# Patient Record
Sex: FEMALE | Race: WHITE | NOT HISPANIC OR LATINO | Employment: OTHER | URBAN - METROPOLITAN AREA
[De-identification: names, ages, dates, MRNs, and addresses within clinical notes are randomized per-mention and may not be internally consistent; named-entity substitution may affect disease eponyms.]

---

## 2020-08-29 ENCOUNTER — APPOINTMENT (EMERGENCY)
Dept: CT IMAGING | Facility: HOSPITAL | Age: 85
End: 2020-08-29
Payer: COMMERCIAL

## 2020-08-29 ENCOUNTER — HOSPITAL ENCOUNTER (EMERGENCY)
Facility: HOSPITAL | Age: 85
End: 2020-08-31
Attending: EMERGENCY MEDICINE | Admitting: EMERGENCY MEDICINE
Payer: COMMERCIAL

## 2020-08-29 DIAGNOSIS — F22 PARANOIA (HCC): ICD-10-CM

## 2020-08-29 DIAGNOSIS — R44.1 VISUAL HALLUCINATIONS: ICD-10-CM

## 2020-08-29 DIAGNOSIS — Z00.8 MEDICAL CLEARANCE FOR PSYCHIATRIC ADMISSION: ICD-10-CM

## 2020-08-29 DIAGNOSIS — Z00.8 ENCOUNTER FOR PSYCHOLOGICAL EVALUATION: Primary | ICD-10-CM

## 2020-08-29 LAB
ANION GAP SERPL CALCULATED.3IONS-SCNC: 7 MMOL/L (ref 4–13)
ATRIAL RATE: 340 BPM
BACTERIA UR QL AUTO: ABNORMAL /HPF
BASOPHILS # BLD AUTO: 0.05 THOUSANDS/ΜL (ref 0–0.1)
BASOPHILS NFR BLD AUTO: 1 % (ref 0–1)
BILIRUB UR QL STRIP: NEGATIVE
BUN SERPL-MCNC: 15 MG/DL (ref 5–25)
CALCIUM SERPL-MCNC: 9.4 MG/DL (ref 8.3–10.1)
CAOX CRY URNS QL MICRO: ABNORMAL /HPF
CHLORIDE SERPL-SCNC: 107 MMOL/L (ref 100–108)
CLARITY UR: ABNORMAL
CO2 SERPL-SCNC: 28 MMOL/L (ref 21–32)
COLOR UR: YELLOW
CREAT SERPL-MCNC: 1.33 MG/DL (ref 0.6–1.3)
EOSINOPHIL # BLD AUTO: 0.13 THOUSAND/ΜL (ref 0–0.61)
EOSINOPHIL NFR BLD AUTO: 2 % (ref 0–6)
ERYTHROCYTE [DISTWIDTH] IN BLOOD BY AUTOMATED COUNT: 14 % (ref 11.6–15.1)
GFR SERPL CREATININE-BSD FRML MDRD: 37 ML/MIN/1.73SQ M
GLUCOSE SERPL-MCNC: 95 MG/DL (ref 65–140)
GLUCOSE UR STRIP-MCNC: NEGATIVE MG/DL
HCT VFR BLD AUTO: 36.9 % (ref 34.8–46.1)
HGB BLD-MCNC: 11.4 G/DL (ref 11.5–15.4)
HGB UR QL STRIP.AUTO: NEGATIVE
IMM GRANULOCYTES # BLD AUTO: 0.03 THOUSAND/UL (ref 0–0.2)
IMM GRANULOCYTES NFR BLD AUTO: 1 % (ref 0–2)
INR PPP: 2.85 (ref 0.84–1.19)
KETONES UR STRIP-MCNC: NEGATIVE MG/DL
LEUKOCYTE ESTERASE UR QL STRIP: NEGATIVE
LYMPHOCYTES # BLD AUTO: 1.18 THOUSANDS/ΜL (ref 0.6–4.47)
LYMPHOCYTES NFR BLD AUTO: 20 % (ref 14–44)
MCH RBC QN AUTO: 31.9 PG (ref 26.8–34.3)
MCHC RBC AUTO-ENTMCNC: 30.9 G/DL (ref 31.4–37.4)
MCV RBC AUTO: 103 FL (ref 82–98)
MONOCYTES # BLD AUTO: 0.5 THOUSAND/ΜL (ref 0.17–1.22)
MONOCYTES NFR BLD AUTO: 9 % (ref 4–12)
NEUTROPHILS # BLD AUTO: 3.92 THOUSANDS/ΜL (ref 1.85–7.62)
NEUTS SEG NFR BLD AUTO: 67 % (ref 43–75)
NITRITE UR QL STRIP: POSITIVE
NON-SQ EPI CELLS URNS QL MICRO: ABNORMAL /HPF
NRBC BLD AUTO-RTO: 0 /100 WBCS
PH UR STRIP.AUTO: 5 [PH] (ref 4.5–8)
PLATELET # BLD AUTO: 180 THOUSANDS/UL (ref 149–390)
PMV BLD AUTO: 10.7 FL (ref 8.9–12.7)
POTASSIUM SERPL-SCNC: 4.1 MMOL/L (ref 3.5–5.3)
PROT UR STRIP-MCNC: NEGATIVE MG/DL
PROTHROMBIN TIME: 29.3 SECONDS (ref 11.6–14.5)
QRS AXIS: -50 DEGREES
QRSD INTERVAL: 152 MS
QT INTERVAL: 412 MS
QTC INTERVAL: 453 MS
RBC # BLD AUTO: 3.57 MILLION/UL (ref 3.81–5.12)
RBC #/AREA URNS AUTO: ABNORMAL /HPF
SARS-COV-2 RNA RESP QL NAA+PROBE: NEGATIVE
SODIUM SERPL-SCNC: 142 MMOL/L (ref 136–145)
SP GR UR STRIP.AUTO: >=1.03 (ref 1–1.03)
T WAVE AXIS: 139 DEGREES
TROPONIN I SERPL-MCNC: <0.02 NG/ML
TSH SERPL DL<=0.05 MIU/L-ACNC: 0.69 UIU/ML (ref 0.36–3.74)
UROBILINOGEN UR QL STRIP.AUTO: 0.2 E.U./DL
VENTRICULAR RATE: 73 BPM
WBC # BLD AUTO: 5.81 THOUSAND/UL (ref 4.31–10.16)
WBC #/AREA URNS AUTO: ABNORMAL /HPF

## 2020-08-29 PROCEDURE — 87635 SARS-COV-2 COVID-19 AMP PRB: CPT | Performed by: EMERGENCY MEDICINE

## 2020-08-29 PROCEDURE — G1004 CDSM NDSC: HCPCS

## 2020-08-29 PROCEDURE — 85610 PROTHROMBIN TIME: CPT | Performed by: EMERGENCY MEDICINE

## 2020-08-29 PROCEDURE — 99285 EMERGENCY DEPT VISIT HI MDM: CPT

## 2020-08-29 PROCEDURE — 70450 CT HEAD/BRAIN W/O DYE: CPT

## 2020-08-29 PROCEDURE — 81001 URINALYSIS AUTO W/SCOPE: CPT

## 2020-08-29 PROCEDURE — 99285 EMERGENCY DEPT VISIT HI MDM: CPT | Performed by: EMERGENCY MEDICINE

## 2020-08-29 PROCEDURE — 84484 ASSAY OF TROPONIN QUANT: CPT | Performed by: EMERGENCY MEDICINE

## 2020-08-29 PROCEDURE — 84443 ASSAY THYROID STIM HORMONE: CPT | Performed by: EMERGENCY MEDICINE

## 2020-08-29 PROCEDURE — 93010 ELECTROCARDIOGRAM REPORT: CPT | Performed by: INTERNAL MEDICINE

## 2020-08-29 PROCEDURE — 36415 COLL VENOUS BLD VENIPUNCTURE: CPT | Performed by: EMERGENCY MEDICINE

## 2020-08-29 PROCEDURE — 80048 BASIC METABOLIC PNL TOTAL CA: CPT | Performed by: EMERGENCY MEDICINE

## 2020-08-29 PROCEDURE — 93005 ELECTROCARDIOGRAM TRACING: CPT

## 2020-08-29 PROCEDURE — 85025 COMPLETE CBC W/AUTO DIFF WBC: CPT | Performed by: EMERGENCY MEDICINE

## 2020-08-29 RX ORDER — POTASSIUM CHLORIDE 20 MEQ/1
20 TABLET, EXTENDED RELEASE ORAL DAILY
Status: DISCONTINUED | OUTPATIENT
Start: 2020-08-30 | End: 2020-08-31 | Stop reason: HOSPADM

## 2020-08-29 RX ORDER — FAMOTIDINE 20 MG/1
20 TABLET, FILM COATED ORAL 2 TIMES DAILY
COMMUNITY
End: 2020-09-10 | Stop reason: HOSPADM

## 2020-08-29 RX ORDER — LEVOTHYROXINE SODIUM 0.05 MG/1
100 TABLET ORAL
Status: DISCONTINUED | OUTPATIENT
Start: 2020-08-30 | End: 2020-08-31 | Stop reason: HOSPADM

## 2020-08-29 RX ORDER — POTASSIUM CHLORIDE 20 MEQ/1
20 TABLET, EXTENDED RELEASE ORAL DAILY
COMMUNITY
End: 2020-09-10 | Stop reason: HOSPADM

## 2020-08-29 RX ORDER — NITROGLYCERIN 0.4 MG/1
0.4 TABLET SUBLINGUAL
COMMUNITY
End: 2020-10-16 | Stop reason: HOSPADM

## 2020-08-29 RX ORDER — WARFARIN SODIUM 5 MG/1
5 TABLET ORAL
Status: DISCONTINUED | OUTPATIENT
Start: 2020-08-29 | End: 2020-08-31 | Stop reason: HOSPADM

## 2020-08-29 RX ORDER — OMALIZUMAB 202.5 MG/1.4ML
150 INJECTION, SOLUTION SUBCUTANEOUS
COMMUNITY
End: 2020-10-16 | Stop reason: HOSPADM

## 2020-08-29 RX ORDER — METOPROLOL TARTRATE 50 MG/1
50 TABLET, FILM COATED ORAL DAILY
Status: DISCONTINUED | OUTPATIENT
Start: 2020-08-30 | End: 2020-08-31 | Stop reason: HOSPADM

## 2020-08-29 RX ORDER — FUROSEMIDE 40 MG/1
40 TABLET ORAL EVERY OTHER DAY
COMMUNITY
End: 2020-09-10 | Stop reason: HOSPADM

## 2020-08-29 RX ORDER — FOLIC ACID 1 MG/1
1 TABLET ORAL DAILY
Status: DISCONTINUED | OUTPATIENT
Start: 2020-08-30 | End: 2020-08-31 | Stop reason: HOSPADM

## 2020-08-29 RX ORDER — FAMOTIDINE 20 MG/1
20 TABLET, FILM COATED ORAL DAILY
Status: DISCONTINUED | OUTPATIENT
Start: 2020-08-30 | End: 2020-08-31 | Stop reason: HOSPADM

## 2020-08-29 RX ORDER — FOLIC ACID 1 MG/1
1 TABLET ORAL DAILY
COMMUNITY
End: 2020-09-10 | Stop reason: HOSPADM

## 2020-08-29 RX ORDER — GLIMEPIRIDE 2 MG/1
2 TABLET ORAL
Status: DISCONTINUED | OUTPATIENT
Start: 2020-08-30 | End: 2020-08-31 | Stop reason: HOSPADM

## 2020-08-29 RX ORDER — SERTRALINE HYDROCHLORIDE 25 MG/1
25 TABLET, FILM COATED ORAL DAILY
COMMUNITY
Start: 2020-05-15 | End: 2020-09-10 | Stop reason: HOSPADM

## 2020-08-29 RX ORDER — METOPROLOL TARTRATE 50 MG/1
50 TABLET, FILM COATED ORAL 2 TIMES DAILY
COMMUNITY
Start: 2020-08-07 | End: 2020-09-10 | Stop reason: HOSPADM

## 2020-08-29 RX ORDER — WARFARIN SODIUM 5 MG/1
5 TABLET ORAL DAILY
COMMUNITY
Start: 2020-05-15 | End: 2020-09-10 | Stop reason: HOSPADM

## 2020-08-29 RX ORDER — ASPIRIN 81 MG/1
81 TABLET ORAL DAILY
Status: DISCONTINUED | OUTPATIENT
Start: 2020-08-30 | End: 2020-08-31 | Stop reason: HOSPADM

## 2020-08-29 RX ORDER — FEXOFENADINE HCL 180 MG/1
180 TABLET ORAL DAILY
Status: ON HOLD | COMMUNITY
End: 2020-09-07 | Stop reason: ALTCHOICE

## 2020-08-29 RX ORDER — GLIMEPIRIDE 2 MG/1
2 TABLET ORAL
COMMUNITY
End: 2020-09-10 | Stop reason: HOSPADM

## 2020-08-29 RX ORDER — LANOLIN ALCOHOL/MO/W.PET/CERES
1000 CREAM (GRAM) TOPICAL DAILY
COMMUNITY
End: 2020-09-10 | Stop reason: HOSPADM

## 2020-08-29 RX ORDER — FUROSEMIDE 40 MG/1
40 TABLET ORAL DAILY
Status: DISCONTINUED | OUTPATIENT
Start: 2020-08-30 | End: 2020-08-31 | Stop reason: HOSPADM

## 2020-08-29 RX ORDER — LEVOTHYROXINE SODIUM 0.1 MG/1
100 TABLET ORAL DAILY
COMMUNITY
End: 2020-09-10 | Stop reason: HOSPADM

## 2020-08-29 RX ADMIN — SERTRALINE HYDROCHLORIDE 25 MG: 50 TABLET ORAL at 21:10

## 2020-08-29 RX ADMIN — WARFARIN SODIUM 5 MG: 5 TABLET ORAL at 21:08
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
## 2020-08-29 NOTE — ED NOTES
Pt reports to RN that she does not want visitors, only  her daughter and her son are to visit  Pt reports that she does not want the nuns that were at her door to come into her room because they are not welcome and they are always trying to sell her things  There are no nuns present and there have not been nuns in the department  RN assures pt that we will check with her before we allow anyone to come into her room and will only allow the people that she is comfortable with to visit her         Creig GABRIELE Modi  08/29/20 8528

## 2020-08-29 NOTE — ED NOTES
Pt and pt daughter are requesting to stay local due to pt daughter not being able to drive     CW spoke with [address] specialist who informed me that there are no beds at [facility], [address] faxed 201 to      Jame Esteban Crisis Worker

## 2020-08-29 NOTE — ED NOTES
Pt is a 82 y o  female who was brought to the ED with   Chief Complaint   Patient presents with    Psychiatric Evaluation     pt's daughter states pt has been seeing people that are not there since may  was seen at [facility] and was told it was due to her B vitamins  paranoid thoughts since last week, pt thinks people are lighting fires in apartment  daughter states the things shes saying don't make sense  Pt brought to the ED by her daughter with complaints of increased V/H "I see people trying to set fires in my apartment" and its been getting worse and I feeling paranoid", Pt daughter reports that pt V/H has been getting worse and pt is paranoid  Pt admits to V/H, Pt denies S/I,H/I,A/H  Intake Assessment completed, Safety risk Assessment completed  CW met with pt and discussed the process of a [facility] admission pt has agreed and signed a 201, CW discussed this case and pt plan with ED Physician who is in agreement with this plan    CW will start bed search and complete the Pre-Cert      Jailyn Shah Crisis Worker Sony Leahy is a 50 

## 2020-08-30 RX ORDER — ACETAMINOPHEN 325 MG/1
650 TABLET ORAL ONCE
Status: COMPLETED | OUTPATIENT
Start: 2020-08-30 | End: 2020-08-30

## 2020-08-30 RX ORDER — EPINEPHRINE 1 MG/ML
INJECTION, SOLUTION, CONCENTRATE INTRAVENOUS
Status: DISCONTINUED
Start: 2020-08-30 | End: 2020-08-30 | Stop reason: WASHOUT

## 2020-08-30 RX ADMIN — FOLIC ACID 1 MG: 1 TABLET ORAL at 08:45

## 2020-08-30 RX ADMIN — METOPROLOL TARTRATE 50 MG: 50 TABLET, FILM COATED ORAL at 08:46

## 2020-08-30 RX ADMIN — LEVOTHYROXINE SODIUM 100 MCG: 50 TABLET ORAL at 08:45

## 2020-08-30 RX ADMIN — ACETAMINOPHEN 650 MG: 325 TABLET ORAL at 10:32

## 2020-08-30 RX ADMIN — WARFARIN SODIUM 5 MG: 5 TABLET ORAL at 18:04

## 2020-08-30 RX ADMIN — SERTRALINE HYDROCHLORIDE 25 MG: 50 TABLET ORAL at 21:49

## 2020-08-30 RX ADMIN — FAMOTIDINE 20 MG: 20 TABLET, FILM COATED ORAL at 08:45

## 2020-08-30 RX ADMIN — ASPIRIN 81 MG: 81 TABLET, COATED ORAL at 08:46

## 2020-08-30 RX ADMIN — POTASSIUM CHLORIDE 20 MEQ: 1500 TABLET, EXTENDED RELEASE ORAL at 08:46

## 2020-08-30 RX ADMIN — GLIMEPIRIDE 2 MG: 2 TABLET ORAL at 08:47

## 2020-08-30 RX ADMIN — FUROSEMIDE 40 MG: 40 TABLET ORAL at 08:46

## 2020-08-30 NOTE — ED NOTES
Assumed care of pt at this time, offered pt a shower pt is agreeable        Akilah Tong RN  08/30/20 0411

## 2020-08-30 NOTE — ED NOTES
Pt talking on phone, sitting comfortably in bed  No distress noted        Francoise Veras  08/30/20 2840

## 2020-08-30 NOTE — ED NOTES
Pt ambulated to restroom and back to room using walker with steady gait        Joleen Frazier RN  08/30/20 5420

## 2020-08-30 NOTE — ED NOTES
Pt ambulated well using a walker, pt showered, changed into clean clothes, brushed her teeth, brushed her hair  Pt's bedding changed, pt ambulated back to her room using walker with steady gait  When arriving back to room pt looks over to chair with her clothing states "she is still sleeping, my daughter is still sleeping" few minutes later pt states "I don't think that's my daughter" pt made aware that it is her clothing on the chair         Skip Stuart RN  08/30/20 7156

## 2020-08-30 NOTE — ED NOTES
Pt finished 100% of food on lunch tray  Ambulated using walker to restroom and back with steady gait        Rock Larry RN  08/30/20 2383

## 2020-08-31 ENCOUNTER — HOSPITAL ENCOUNTER (INPATIENT)
Facility: HOSPITAL | Age: 85
LOS: 10 days | Discharge: HOME | DRG: 885 | End: 2020-09-10
Attending: PSYCHIATRY & NEUROLOGY | Admitting: PSYCHIATRY & NEUROLOGY
Payer: COMMERCIAL

## 2020-08-31 VITALS
RESPIRATION RATE: 16 BRPM | OXYGEN SATURATION: 98 % | DIASTOLIC BLOOD PRESSURE: 78 MMHG | WEIGHT: 206.79 LBS | TEMPERATURE: 97.8 F | HEART RATE: 78 BPM | SYSTOLIC BLOOD PRESSURE: 154 MMHG

## 2020-08-31 DIAGNOSIS — I48.0 PAROXYSMAL ATRIAL FIBRILLATION (HCC): ICD-10-CM

## 2020-08-31 DIAGNOSIS — I25.5 ISCHEMIC CARDIOMYOPATHY: ICD-10-CM

## 2020-08-31 DIAGNOSIS — E53.8 B12 DEFICIENCY: ICD-10-CM

## 2020-08-31 DIAGNOSIS — F29 PSYCHOSIS, UNSPECIFIED PSYCHOSIS TYPE (HCC): Primary | ICD-10-CM

## 2020-08-31 DIAGNOSIS — E11.9 DIABETES MELLITUS TYPE 2, NONINSULIN DEPENDENT (HCC): ICD-10-CM

## 2020-08-31 DIAGNOSIS — E03.9 ACQUIRED HYPOTHYROIDISM: ICD-10-CM

## 2020-08-31 DIAGNOSIS — I25.10 CORONARY ARTERY DISEASE INVOLVING NATIVE CORONARY ARTERY OF NATIVE HEART WITHOUT ANGINA PECTORIS: ICD-10-CM

## 2020-08-31 DIAGNOSIS — K21.9 GERD (GASTROESOPHAGEAL REFLUX DISEASE): ICD-10-CM

## 2020-08-31 DIAGNOSIS — Z00.8 MEDICAL CLEARANCE FOR PSYCHIATRIC ADMISSION: ICD-10-CM

## 2020-08-31 DIAGNOSIS — E53.8 FOLATE DEFICIENCY: ICD-10-CM

## 2020-08-31 DIAGNOSIS — F32.A DEPRESSIVE DISORDER: ICD-10-CM

## 2020-08-31 DIAGNOSIS — E87.6 HYPOKALEMIA: ICD-10-CM

## 2020-08-31 LAB
AMPHETAMINES SERPL QL SCN: NEGATIVE
BARBITURATES UR QL: NEGATIVE
BENZODIAZ UR QL: NEGATIVE
COCAINE UR QL: NEGATIVE
METHADONE UR QL: NEGATIVE
OPIATES UR QL SCN: NEGATIVE
OXYCODONE+OXYMORPHONE UR QL SCN: NEGATIVE
PCP UR QL: NEGATIVE
THC UR QL: NEGATIVE

## 2020-08-31 PROCEDURE — 80307 DRUG TEST PRSMV CHEM ANLYZR: CPT | Performed by: EMERGENCY MEDICINE

## 2020-08-31 PROCEDURE — 99284 EMERGENCY DEPT VISIT MOD MDM: CPT | Performed by: NURSE PRACTITIONER

## 2020-08-31 RX ORDER — WARFARIN SODIUM 5 MG/1
5 TABLET ORAL
Status: CANCELLED | OUTPATIENT
Start: 2020-08-31

## 2020-08-31 RX ORDER — HALOPERIDOL 5 MG/ML
2.5 INJECTION INTRAMUSCULAR
Status: CANCELLED | OUTPATIENT
Start: 2020-08-31

## 2020-08-31 RX ORDER — MAGNESIUM HYDROXIDE/ALUMINUM HYDROXICE/SIMETHICONE 120; 1200; 1200 MG/30ML; MG/30ML; MG/30ML
30 SUSPENSION ORAL EVERY 4 HOURS PRN
Status: CANCELLED | OUTPATIENT
Start: 2020-08-31

## 2020-08-31 RX ORDER — FOLIC ACID 1 MG/1
1 TABLET ORAL DAILY
Status: CANCELLED | OUTPATIENT
Start: 2020-09-01

## 2020-08-31 RX ORDER — POTASSIUM CHLORIDE 20 MEQ/1
20 TABLET, EXTENDED RELEASE ORAL DAILY
Status: DISCONTINUED | OUTPATIENT
Start: 2020-09-01 | End: 2020-09-10 | Stop reason: HOSPADM

## 2020-08-31 RX ORDER — FAMOTIDINE 20 MG/1
20 TABLET, FILM COATED ORAL DAILY
Status: CANCELLED | OUTPATIENT
Start: 2020-09-01

## 2020-08-31 RX ORDER — HALOPERIDOL 5 MG/ML
2.5 INJECTION INTRAMUSCULAR
Status: DISCONTINUED | OUTPATIENT
Start: 2020-08-31 | End: 2020-09-10 | Stop reason: HOSPADM

## 2020-08-31 RX ORDER — POTASSIUM CHLORIDE 20 MEQ/1
20 TABLET, EXTENDED RELEASE ORAL DAILY
Status: CANCELLED | OUTPATIENT
Start: 2020-09-01

## 2020-08-31 RX ORDER — WARFARIN SODIUM 5 MG/1
5 TABLET ORAL
Status: DISCONTINUED | OUTPATIENT
Start: 2020-09-01 | End: 2020-09-01

## 2020-08-31 RX ORDER — FAMOTIDINE 20 MG/1
20 TABLET, FILM COATED ORAL DAILY
Status: DISCONTINUED | OUTPATIENT
Start: 2020-09-01 | End: 2020-09-07

## 2020-08-31 RX ORDER — ASPIRIN 81 MG/1
81 TABLET ORAL DAILY
Status: DISCONTINUED | OUTPATIENT
Start: 2020-09-01 | End: 2020-09-10 | Stop reason: HOSPADM

## 2020-08-31 RX ORDER — ACETAMINOPHEN 325 MG/1
650 TABLET ORAL ONCE
Status: COMPLETED | OUTPATIENT
Start: 2020-08-31 | End: 2020-08-31

## 2020-08-31 RX ORDER — POLYETHYLENE GLYCOL 3350 17 G/17G
17 POWDER, FOR SOLUTION ORAL DAILY PRN
Status: DISCONTINUED | OUTPATIENT
Start: 2020-08-31 | End: 2020-09-10 | Stop reason: HOSPADM

## 2020-08-31 RX ORDER — POLYETHYLENE GLYCOL 3350 17 G/17G
17 POWDER, FOR SOLUTION ORAL DAILY PRN
Status: CANCELLED | OUTPATIENT
Start: 2020-08-31

## 2020-08-31 RX ORDER — FUROSEMIDE 40 MG/1
40 TABLET ORAL DAILY
Status: CANCELLED | OUTPATIENT
Start: 2020-09-01

## 2020-08-31 RX ORDER — FOLIC ACID 1 MG/1
1 TABLET ORAL DAILY
Status: DISCONTINUED | OUTPATIENT
Start: 2020-09-01 | End: 2020-09-10 | Stop reason: HOSPADM

## 2020-08-31 RX ORDER — MAGNESIUM HYDROXIDE/ALUMINUM HYDROXICE/SIMETHICONE 120; 1200; 1200 MG/30ML; MG/30ML; MG/30ML
30 SUSPENSION ORAL EVERY 4 HOURS PRN
Status: DISCONTINUED | OUTPATIENT
Start: 2020-08-31 | End: 2020-09-10 | Stop reason: HOSPADM

## 2020-08-31 RX ORDER — GLIMEPIRIDE 2 MG/1
2 TABLET ORAL
Status: CANCELLED | OUTPATIENT
Start: 2020-09-01

## 2020-08-31 RX ORDER — LORAZEPAM 1 MG/1
1 TABLET ORAL ONCE
Status: DISCONTINUED | OUTPATIENT
Start: 2020-08-31 | End: 2020-08-31 | Stop reason: HOSPADM

## 2020-08-31 RX ORDER — GLIMEPIRIDE 2 MG/1
2 TABLET ORAL
Status: DISCONTINUED | OUTPATIENT
Start: 2020-09-01 | End: 2020-09-10 | Stop reason: HOSPADM

## 2020-08-31 RX ORDER — ASPIRIN 81 MG/1
81 TABLET ORAL DAILY
Status: CANCELLED | OUTPATIENT
Start: 2020-09-01

## 2020-08-31 RX ORDER — ACETAMINOPHEN 325 MG/1
650 TABLET ORAL EVERY 6 HOURS PRN
Status: DISCONTINUED | OUTPATIENT
Start: 2020-08-31 | End: 2020-09-10 | Stop reason: HOSPADM

## 2020-08-31 RX ORDER — LEVOTHYROXINE SODIUM 0.1 MG/1
100 TABLET ORAL
Status: DISCONTINUED | OUTPATIENT
Start: 2020-09-01 | End: 2020-09-10 | Stop reason: HOSPADM

## 2020-08-31 RX ORDER — LEVOTHYROXINE SODIUM 0.05 MG/1
100 TABLET ORAL
Status: CANCELLED | OUTPATIENT
Start: 2020-09-01

## 2020-08-31 RX ORDER — FUROSEMIDE 40 MG/1
40 TABLET ORAL DAILY
Status: DISCONTINUED | OUTPATIENT
Start: 2020-09-01 | End: 2020-09-07

## 2020-08-31 RX ORDER — ACETAMINOPHEN 325 MG/1
650 TABLET ORAL EVERY 6 HOURS PRN
Status: CANCELLED | OUTPATIENT
Start: 2020-08-31

## 2020-08-31 RX ADMIN — FAMOTIDINE 20 MG: 20 TABLET, FILM COATED ORAL at 08:55

## 2020-08-31 RX ADMIN — ACETAMINOPHEN 650 MG: 325 TABLET ORAL at 00:59

## 2020-08-31 RX ADMIN — ASPIRIN 81 MG: 81 TABLET, COATED ORAL at 08:56

## 2020-08-31 RX ADMIN — METOPROLOL TARTRATE 50 MG: 50 TABLET, FILM COATED ORAL at 08:56

## 2020-08-31 RX ADMIN — GLIMEPIRIDE 2 MG: 2 TABLET ORAL at 07:40

## 2020-08-31 RX ADMIN — FOLIC ACID 1 MG: 1 TABLET ORAL at 08:55

## 2020-08-31 RX ADMIN — LEVOTHYROXINE SODIUM 100 MCG: 50 TABLET ORAL at 06:46

## 2020-08-31 RX ADMIN — POTASSIUM CHLORIDE 20 MEQ: 1500 TABLET, EXTENDED RELEASE ORAL at 08:56

## 2020-08-31 RX ADMIN — METOPROLOL TARTRATE 25 MG: 25 TABLET, FILM COATED ORAL at 22:12

## 2020-08-31 RX ADMIN — WARFARIN SODIUM 5 MG: 5 TABLET ORAL at 19:25

## 2020-08-31 NOTE — EMTALA/ACUTE CARE TRANSFER
[facility]  [address] [facility] [address]  Dept: [phone]      EMTALA TRANSFER CONSENT    NAME Dru Avina                                         1935                              MRN [id number]    I have been informed of my rights regarding examination, treatment, and transfer   by Dr Chaim Peck MD    Benefits: Specialized equipment and/or services available at the receiving facility (Include comment)________________________    Risks: Potential for delay in receiving treatment, Increased discomfort during transfer      Consent for Transfer:  I acknowledge that my medical condition has been evaluated and explained to me by the emergency department physician or other qualified medical person and/or my attending physician, who has recommended that I be transferred to the service of  Accepting Physician: Dr Cindi Nix at [address] [place] [address] Name, [address] : [address]  The above potential benefits of such transfer, the potential risks associated with such transfer, and the probable risks of not being transferred have been explained to me, and I fully understand them  The doctor has explained that, in my case, the benefits of transfer outweigh the risks  I agree to be transferred  I authorize the performance of emergency medical procedures and treatments upon me in both transit and upon arrival at the receiving facility  Additionally, I authorize the release of any and all medical records to the receiving facility and request they be transported with me, if possible  I understand that the safest mode of transportation during a medical emergency is an ambulance and that the Hospital advocates the use of this mode of transport   Risks of traveling to the receiving facility by car, including absence of medical control, life sustaining equipment, such as oxygen, and medical personnel has been explained to me and I fully understand them  (DINORAH CORRECT BOX BELOW)  [  ]  I consent to the stated transfer and to be transported by ambulance/helicopter  [  ]  I consent to the stated transfer, but refuse transportation by ambulance and accept full responsibility for my transportation by car  I understand the risks of non-ambulance transfers and I exonerate the Hospital and its staff from any deterioration in my condition that results from this refusal     X___________________________________________    DATE  20  TIME________  Signature of patient or legally responsible individual signing on patient behalf           RELATIONSHIP TO PATIENT_________________________          Provider Certification    NAME Jose Maloney                                         1935                              MRN [id number]    A medical screening exam was performed on the above named patient  Based on the examination:    Condition Necessitating Transfer The primary encounter diagnosis was Encounter for psychological evaluation  Diagnoses of Visual hallucinations, Paranoia ([facility] [address] ), and Medical clearance for psychiatric admission were also pertinent to this visit      Patient Condition: The patient has been stabilized such that within reasonable medical probability, no material deterioration of the patient condition or the condition of the unborn child(javier) is likely to result from the transfer    Reason for Transfer: Level of Care needed not available at this facility    Transfer Requirements: [address] [facility]   · Space available and qualified personnel available for treatment as acknowledged by Eric White ; [phone]  · Agreed to accept transfer and to provide appropriate medical treatment as acknowledged by       Dr Petrona Phillips  · Appropriate medical records of the examination and treatment of the patient are provided at the time of transfer   [address] _______  · Transfer will be performed by qualified personnel from [facility]  and appropriate transfer equipment as required, including the use of necessary and appropriate life support measures  Provider Certification: I have examined the patient and explained the following risks and benefits of being transferred/refusing transfer to the patient/family:  General risk, such as traffic hazards, adverse weather conditions, rough terrain or turbulence, possible failure of equipment (including vehicle or aircraft), or consequences of actions of persons outside the control of the transport personnel      Based on these reasonable risks and benefits to the patient and/or the unborn child(javier), and based upon the information available at the time of the patients examination, I certify that the medical benefits reasonably to be expected from the provision of appropriate medical treatments at another medical facility outweigh the increasing risks, if any, to the individuals medical condition, and in the case of labor to the unborn child, from effecting the transfer      X____________________________________________ DATE 08/31/20        TIME_______      ORIGINAL - SEND TO MEDICAL RECORDS   COPY - SEND WITH PATIENT DURING TRANSFER

## 2020-08-31 NOTE — ED NOTES
Pt ambulated with walker to and from bathroom        Pratik Aceves  08/30/20 2029 GOAL: Patient will perform bed mobility with min assist in 2 weeks GOAL: Patient will perform bed mobility with supervision in 2 weeks

## 2020-08-31 NOTE — CONSULTS
Consultation - Behavioral Health     Identification Data: Jasiel Nowak 82 y o  female MRN: [id number]  Unit/Bed#: ED 18 Encounter: [id number]    08/31/20  1:32 PM    Inpatient consult to Psychiatry  Consult performed by: KEIKO Rodarte  Consult ordered by: Niko Ashton DO        Physician Requesting Consult: Stevenson Gilford, MD  Principal Problem:<principal problem not specified>    Reason for Consult:  visual halluciantions    History of Present Illness     Jasiel Nowak is a 82 y o  female with a history of depression and anxiety who was admitted to the medical service on 8/29/2020 due to visual hallucinations  Psychiatric consultation was requested due to visual hallucinations  Psychiatric symptoms prior to admission included depression, poor concentration, poor appetite, difficulty sleeping and visual hallucinations of "people"  Onset of symptoms was abrupt starting 3 months ago with gradually worsening course since that time  Stressors preceding admission included COVID-19 issues and ongoing anxiety  Assessment/Plan     Active Problems:    * No active hospital problems  *      Psychiatric Assessment:  Patient is an 82year old female who presented to the ED with c/o worsening visual hallucinations  Patient has no past psychiatric history  Patient received a CT scan and MRI of head which was unremarkable  Daughter is concerned d/t patient living alone  Patient reports VH have been present since May  Daughter reports patient has become more paranoid since the hallucinations began and is concerned patient may do something when responding to them and hurt herself  Upon psychiatric assessment, patient presents with symptoms of depression and anxiety  Patient has a previous diagnosis of depression  Patient displays good eye contact throughout the encounter with an appropriate affect   Patient endorses feelings of decreased/difficulty sleeping, decreased appetite, decreased concentration (reports distracted by her hallucinations), decreased energy level, daily worry, nervousness, and fatigue  Patient states symptoms have been present since May 2020 and have been progressively worsening since that time  Patient states symptoms have never fully resolved since they were diagnosed  Patient currently rates depression as 5/10 (10 being the worst) with moderate anxiety noted  Patient denies suicidal/homicidal ideations or auditory hallucinations  Patient reports visual hallucinations of people  Patient states she has attempted to speak with the people but they do not respond to her and only speak to each other  Patient is responding to internal stimuli at this time as she reported something crawling on the floor during our encounter  Patient reports the last time they felt emotionally well was 3 years ago when her great niece got  and currently rates their level of functioning as decreased  Patient denies access to firearms  Patient reports her hallucinations began in may after she was forced to social distance due to Covid  She states prior to that she was always with her family or going out to lunch with her friends  Patient denies any other events taking place at that time  Patient reports she sees people coming through her walls and she believes they are eating her [place] cookies  She reports a lady with a baby hiding under her bed  Patient states she has not experienced anything like this in the past  Patient is very aware of her hallucinations and states they scare her and reports worsening paranoia  She reports these people light candles and she is worried they will burn down her apartment complex  Patient was on a BHU recently and stated she was placed on Zoloft which made her feel better but then stopped working  She reported the visual hallucinations were always present during that time   Per notes, patient was started on Zoloft d/t decreased vitamin B12 levels which was felt was causing her depression  Patient was able to successfully complete a MSE with very little difficulty  Patient needed prompting on past president only  Patient Strengths: ability for insight, average or above intelligence, compliant with medication, general fund of knowledge, motivation for treatment/growth, patient is on a voluntary commitment, supportive family     Patient Barriers: fear of Covid-19    Diagnosis: Psychosis (F29)    Plan/Recommendation:   1  Patient signed a 201 voluntary commitment with crisis  Awaiting decision from [employer]  2  Psychiatry will sign off  Psychiatric Review Of Systems:    Sleep changes: decreased sleep, difficulty sleeping, difficulty falling asleep  Appetite changes: decreased appetite  Weight changes: no weight change  Energy/anergy: decreased energy  Interest/pleasure/anhedonia: decreased  Somatic symptoms: no  Anxiety/panic: worrying daily  Brie: no  Guilty/hopeless: no  Self injurious behavior/risky behavior: Patient denies current risk or intent to self harm  Suicidal ideation: Denies suicidal ideation  Homicidal ideation: Patient denies homicidal ideations  Auditory hallucinations: no  Visual hallucinations: yes, visual hallucinations of "people"  Other hallucinations: no  Delusional thinking: no  Eating disorder history: no  Obsessive/compulsive symptoms: no    Medical Review Of Systems:      General sleep disturbances, appetite disturbances and decreased functioning   Personality no change in personality   Constitutional feeling tired and low energy   ENT negative   Cardiovascular negative   Respiratory negative   Gastrointestinal negative   Genitourinary negative   Musculoskeletal negative   Integumentary negative   Neurological negative   Endocrine negative   Other Symptoms all other systems are negative       Allergies:     Allergies   Allergen Reactions    Adhesive [Medical Tape]     Amoxicillin     Ancef [Cefazolin]     Cephalosporins     Dofetilide      Cannot take generic    Epinephrine     Erythromycin     Iodine     Levofloxacin     Losartan     Morphine     Other      seafood    Oxycodone     Penicillins     Percocet [Oxycodone-Acetaminophen]     Pineapple     Shellfish-Derived Products     Thorazine [Chlorpromazine]        Medications: All current active medications have been reviewed  Current medications:   Current Facility-Administered Medications   Medication Dose Route Frequency    aspirin (ECOTRIN LOW STRENGTH) EC tablet 81 mg  81 mg Oral Daily    famotidine (PEPCID) tablet 20 mg  20 mg Oral Daily    folic acid (FOLVITE) tablet 1 mg  1 mg Oral Daily    furosemide (LASIX) tablet 40 mg  40 mg Oral Daily    glimepiride (AMARYL) tablet 2 mg  2 mg Oral Daily With Breakfast    levothyroxine tablet 100 mcg  100 mcg Oral Early Morning    LORazepam (ATIVAN) tablet 1 mg  1 mg Oral Once    metoprolol tartrate (LOPRESSOR) tablet 50 mg  50 mg Oral Daily    potassium chloride (K-DUR,KLOR-CON) CR tablet 20 mEq  20 mEq Oral Daily    sertraline (ZOLOFT) tablet 25 mg  25 mg Oral HS    warfarin (COUMADIN) tablet 5 mg  5 mg Oral Daily (warfarin)     Medication prior to admission:   Prior to Admission Medications   Prescriptions Last Dose Informant Patient Reported? Taking?    ASPIRIN 81 PO   Yes Yes   Sig: Take 81 mg by mouth daily   famotidine (PEPCID) 20 mg tablet   Yes Yes   Sig: Take 20 mg by mouth 2 (two) times a day   fexofenadine (ALLEGRA) 180 MG tablet   Yes Yes   Sig: Take 180 mg by mouth daily   folic acid (FOLVITE) 1 mg tablet   Yes Yes   Sig: Take 1 mg by mouth daily   furosemide (LASIX) 40 mg tablet   Yes No   Sig: Take 40 mg by mouth every other day   glimepiride (AMARYL) 2 mg tablet   Yes No   Sig: Take 1 mg by mouth 2 (two) times a day   levothyroxine 100 mcg tablet   Yes No   Sig: Take 100 mcg by mouth daily   metoprolol tartrate (LOPRESSOR) 50 mg tablet   Yes Yes   Sig: Take 50 mg by mouth 2 (two) times a day   nitroglycerin (NITROSTAT) 0 4 mg SL tablet   Yes Yes   Sig: Place 0 4 mg under the tongue every 5 (five) minutes as needed for chest pain   omalizumab (Xolair) 150 mg   Yes Yes   Si mg by Subcutaneous (multi inj) route every 28 days   potassium chloride (K-DUR,KLOR-CON) 20 mEq tablet   Yes Yes   Sig: Take 20 mEq by mouth daily   sertraline (ZOLOFT) 25 mg tablet   Yes Yes   Sig: Take 25 mg by mouth daily   vitamin B-12 (VITAMIN B-12) 1,000 mcg tablet   Yes Yes   Sig: Take 1,000 mcg by mouth daily   warfarin (COUMADIN) 5 mg tablet   Yes Yes   Sig: Take 5 mg by mouth daily      Facility-Administered Medications: None       Objective     Mental Status Evaluation:    Appearance Adequate hygiene and grooming and Good eye contact   Behavior cooperative and friendly   Mood anxious and depressed   Speech Normal rate and volume, Normal rate and Normal volume   Affect appropriate   Thought Processes Goal directed and coherent   Thought Content Paranoid and mistrustful   Associations Tightly connected   Perceptual Disturbances Visual hallucinations   Risk Potential Suicidal/Homicidal Ideation - No evidence of suicidal or homicidal ideation and Patient does not verbalize suicidal or homicidal ideation  Risk of Violence - No evidence of risk for violence found on assessment  Risk of Self Mutilation - No evidence of risk for self mutilation found on assessment   Orientation oriented to person, place, time/date and situation   Memory recent and remote memory grossly intact   Consciousness alert and awake   Attention/Concentration attention span and concentration are age appropriate   Intellect appears to be of average intelligence   Insight fair   Judgement fair   Muscle Strength and Gait uses cane, uses walker   Motor Activity no abnormal movements   Language no difficulty naming common objects, no difficulty repeating a phrase   Fund of Knowledge adequate knowledge of current events  adequate fund of knowledge regarding past history  adequate fund of knowledge regarding vocabulary    Pain none   Pain Scale 0       Vital signs in last 24 hours:    HR:  [63-76] 68  Resp:  [16-18] 18  BP: (106-158)/(52-85) 145/85  No intake or output data in the 24 hours ending 08/31/20 1332      Laboratory Results:   I have personally reviewed all pertinent laboratory/tests results  Most Recent Labs:   Lab Results   Component Value Date    WBC 5 81 08/29/2020    RBC 3 57 (L) 08/29/2020    HGB 11 4 (L) 08/29/2020    HCT 36 9 08/29/2020     08/29/2020    RDW 14 0 08/29/2020    NEUTROABS 3 92 08/29/2020    K 4 1 08/29/2020     08/29/2020    CO2 28 08/29/2020    BUN 15 08/29/2020    CREATININE 1 33 (H) 08/29/2020    CALCIUM 9 4 08/29/2020    GZK1SCSZYCPE 0 686 08/29/2020     Drug Screen: No results found for: AMPMETHUR, BARBTUR, BDZUR, THCUR, COCAINEUR, METHADONEUR, OPIATEUR, PCPUR, ECSTASYUR  Vitamin D Level No results found for: VOVK71DQDUYQ, SDMC809ETIO  Vitamin B12 No results found for: [id number]  Folate No results found for: FOLATE  Urinalysis   Lab Results   Component Value Date    COLORU Yellow 08/29/2020    CLARITYU Cloudy 08/29/2020    SPECGRAV >=1 030 08/29/2020    PHUR 5 0 08/29/2020    LEUKOCYTESUR Negative 08/29/2020    NITRITE Positive (A) 08/29/2020    GLUCOSEU Negative 08/29/2020    KETONESU Negative 08/29/2020    UROBILINOGEN 0 2 08/29/2020    BILIRUBINUR Negative 08/29/2020    BLOODU Negative 08/29/2020    RBCUA None Seen 08/29/2020    WBCUA 2-4 (A) 08/29/2020    EPIS Occasional 08/29/2020    BACTERIA Innumerable (A) 08/29/2020     EKG   Lab Results   Component Value Date    VENTRATE 73 08/29/2020    ATRIALRATE 340 08/29/2020    QRSDINT 152 08/29/2020    QTINT 412 08/29/2020    QRSAXIS -50 08/29/2020    TWAVEAXIS 139 08/29/2020     Imaging Studies: Ct Head Wo Contrast    Result Date: 8/29/2020  Narrative: CT BRAIN - WITHOUT CONTRAST INDICATION:   Altered mental status  COMPARISON:  None  TECHNIQUE:  CT examination of the brain was performed    In addition to axial images, sagittal and coronal 2D reformatted images were created and submitted for interpretation  Radiation dose length product (DLP) for this visit:  803 mGy-cm   This examination, like all CT scans performed in the [facility], was performed utilizing techniques to minimize radiation dose exposure, including the use of iterative reconstruction and automated exposure control  IMAGE QUALITY:  Diagnostic  FINDINGS: PARENCHYMA: Decreased attenuation is noted in periventricular and subcortical white matter demonstrating an appearance that is statistically most likely to represent mild microangiopathic change  No CT signs of acute infarction  No intracranial mass, mass effect or midline shift  No acute parenchymal hemorrhage  VENTRICLES AND EXTRA-AXIAL SPACES:  Normal for the patient's age  VISUALIZED ORBITS AND PARANASAL SINUSES:  Unremarkable  CALVARIUM AND EXTRACRANIAL SOFT TISSUES:  Normal      Impression: No acute intracranial abnormality  Workstation performed: [id number]   Recent Imaging Studies: Procedure: Ct Head Wo Contrast    Result Date: 8/29/2020  Narrative: CT BRAIN - WITHOUT CONTRAST INDICATION:   Altered mental status  COMPARISON:  None  TECHNIQUE:  CT examination of the brain was performed  In addition to axial images, sagittal and coronal 2D reformatted images were created and submitted for interpretation  Radiation dose length product (DLP) for this visit:  803 mGy-cm   This examination, like all CT scans performed in the [facility], was performed utilizing techniques to minimize radiation dose exposure, including the use of iterative reconstruction and automated exposure control  IMAGE QUALITY:  Diagnostic  FINDINGS: PARENCHYMA: Decreased attenuation is noted in periventricular and subcortical white matter demonstrating an appearance that is statistically most likely to represent mild microangiopathic change  No CT signs of acute infarction  No intracranial mass, mass effect or midline shift  No acute parenchymal hemorrhage  VENTRICLES AND EXTRA-AXIAL SPACES:  Normal for the patient's age  VISUALIZED ORBITS AND PARANASAL SINUSES:  Unremarkable  CALVARIUM AND EXTRACRANIAL SOFT TISSUES:  Normal      Impression: No acute intracranial abnormality  Workstation performed: [id number]     Imaging Studies: Ct Head Wo Contrast    Result Date: 8/29/2020  Narrative: CT BRAIN - WITHOUT CONTRAST INDICATION:   Altered mental status  COMPARISON:  None  TECHNIQUE:  CT examination of the brain was performed  In addition to axial images, sagittal and coronal 2D reformatted images were created and submitted for interpretation  Radiation dose length product (DLP) for this visit:  803 mGy-cm   This examination, like all CT scans performed in the [facility], was performed utilizing techniques to minimize radiation dose exposure, including the use of iterative reconstruction and automated exposure control  IMAGE QUALITY:  Diagnostic  FINDINGS: PARENCHYMA: Decreased attenuation is noted in periventricular and subcortical white matter demonstrating an appearance that is statistically most likely to represent mild microangiopathic change  No CT signs of acute infarction  No intracranial mass, mass effect or midline shift  No acute parenchymal hemorrhage  VENTRICLES AND EXTRA-AXIAL SPACES:  Normal for the patient's age  VISUALIZED ORBITS AND PARANASAL SINUSES:  Unremarkable  CALVARIUM AND EXTRACRANIAL SOFT TISSUES:  Normal      Impression: No acute intracranial abnormality   Workstation performed: [id number]       Code Status: No Order  Advance Directive and Living Will:       Power of :      Historical Information     Past Psychiatric History:     Past Inpatient Psychiatric Treatment:   One past inpatient psychiatric hospitalization  Past Outpatient Psychiatric Treatment:    No history of past outpatient psychiatric treatment  Past Suicide Attempts: No evidence of past suicide attempts  Past Violent Behavior: No evidence of past violent behavior and Patient denies history of violent behavior  Past Psychiatric Medication Trials: Zoloft  Trial of injectable psychiatric medication: no    Traumatic History:     Abuse: patient denies history of abuse  Other Traumatic Events: none      Substance Abuse History:    Social History     Tobacco History     Smoking Status  Never Smoker    Smokeless Tobacco Use  Unknown          Alcohol History     Alcohol Use Status  Never          Drug Use     Drug Use Status  Never          Sexual Activity     Sexually Active  Not Asked          Activities of Daily Living    Not Asked                 I have assessed this patient for substance use within the past 12 months and agree with documented findings below       Alcohol use: denies use  Recreational drug use:   Cocaine:  denies use  Heroin:  denies use  Marijuana:  denies use  Other drugs: denies use     Longest clean time: not applicable  History of Inpatient/Outpatient rehabilitation program: not applicable, no  Smoking history: denies use  Use of caffeine: patient reports only drinking decaf coffee, no caffeine use    Family Psychiatric History:     Psychiatric Illness:  patient denies  Substance Abuse:  patient denies  Suicide Attempts:  patient denies    Social History:    Education: high school diploma/GED  Learning Disabilities: none  Marital History: ,   24 yrs ago from a massive heart attack  Children: 2 daugther, 61 and son, 46  Living Arrangement: The patient lives alone in an apartment  Occupational History: retired, 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   Functioning Relationships: good support system, alone & isolated, family supportive but pt lives alone and has been isolated d/t Covid-19  Legal History: none   History: None    Past Medical History:    History of Seizures: no  History of Head injury with loss of consciousness: yes, with loss of consciousness, patient reports 2 previous falls approx 2 yrs ago  The last fall she hit her head on the towel rail and blacked out  Unsure how long she was unconscious  Past Medical History:   Diagnosis Date    Diabetes mellitus ([facility] [address] [facility] )     Disease of thyroid gland     Heart attack ([facility] [address] )     High cholesterol     Hypertension      History reviewed  No pertinent surgical history  Treatment Plan:     Planned Medication Changes: All current active medications have been reviewed  Continue current medications:  Current Facility-Administered Medications   Medication Dose Route Frequency Provider Last Rate    aspirin  81 mg Oral Daily Francheska Heads, DO      famotidine  20 mg Oral Daily Francheska Heads, DO      folic acid  1 mg Oral Daily Francheska Heads, DO      furosemide  40 mg Oral Daily Francheska Heads, DO      glimepiride  2 mg Oral Daily With Breakfast Francheska Heads, DO      levothyroxine  100 mcg Oral Early Morning Francheska Heads, DO      LORazepam  1 mg Oral Once Maribel BROOKLYN Schroeder, DO      metoprolol tartrate  50 mg Oral Daily Francheska Heads, DO      potassium chloride  20 mEq Oral Daily Francheska Heads, DO      sertraline  25 mg Oral HS Francheska Heads, DO      warfarin  5 mg Oral Daily (warfarin) Francheska Heads, DO         Risks / Benefits of Treatment:    Discussed risks and benefits of treatment with patient including risk of suicidality and serotonin syndrome related to treatment with antidepressants; Risk of induction of manic symptoms in certain patient populations     Counseling / Coordination of Care: Total floor / unit time spent today 45 minutes  Greater than 50% of total time was spent with the patient and / or family counseling and / or coordination of care  A description of the counseling / coordination of care:   Patient's presentation on admission and proposed treatment plan discussed with treatment team   Diagnosis, medication changes and treatment plan reviewed with patient    Events leading to admission reviewed with patient  Supportive therapy provided to patient  KEIKO Murdock 08/31/20    Code Status: No Order      Portions of the record may have been created with voice recognition software  Occasional wrong word or "sound a like" substitutions may have occurred due to the inherent limitations of voice recognition software  Read the chart carefully and recognize, using context, where substitutions have occurred

## 2020-08-31 NOTE — ED NOTES
Patient sleeping at time of suicide risk reassessment  Will re-evaluate when patient awakens due to patient's difficulty in falling asleep       [address], RN  08/31/20 9853

## 2020-08-31 NOTE — ED NOTES
Patient assisted to restroom  Ambulatory with cane       [address] [facility] [address], RN  08/31/20 4745

## 2020-08-31 NOTE — ED NOTES
Assumed care of patient at this time  Patient in room on stretcher talking to daughter  Respirations equal and relax, no distress noted a this time        Froilan Scanlon RN  08/31/20 8934 normal...

## 2020-08-31 NOTE — ED NOTES
Patient ambulatory to restroom with cane, attended by RN  No difficulty  Upon returning to bed, provided with additional blankets for comfort        [address] [place] [address], RN  08/30/20 0958

## 2020-08-31 NOTE — ED NOTES
Insurance Authorization for admission:   Phone call placed to [employer]  Phone number: [phone]  Spoke to [facility]  3 days approved  Level of care: inpatient psych  Review on 9/2  Authorization # [id number]  Concurrent Pedro Pablo Cabrera @ [phone]    EVS (Eligibility Verification System) called - [phone]  Automated system indicates: not on file  Insurance Authorization for Transportation:  Not needed for ED to hospital transfer

## 2020-08-31 NOTE — ED NOTES
Patient is accepted at [facility]  Patient is accepted by Dr Merlin Notice  Transportation is arranged with [facility]  Transportation is scheduled for 9:00PM    Patient may go to the floor after 9PM         Nurse report is to be called to [phone] prior to patient transfer

## 2020-08-31 NOTE — ED NOTES
Assumed pt care at this time  Pt sitting bedside with no signs of distress  Pt is calm and cooperative        Nancy Noble RN  08/31/20 5931

## 2020-08-31 NOTE — ED NOTES
Patient reporting difficulty sleeping  Provider notified          [address] [facility] [address], RN  08/31/20 8929

## 2020-08-31 NOTE — ED NOTES
RN went to medicate pt and she expressed that she was nervous for her transfer tonight  Pt stated she is happy that Levy Cook is helping her but wants her nerves to calm down  Pt denies auditory and visual hallucinations at this time        Xavier Akhtar RN  08/31/20 1935

## 2020-08-31 NOTE — ED CARE HANDOFF
Emergency Department Sign Out Note        Sign out and transfer of care from off-going provider  See Separate Emergency Department note  The patient, Isabella Mathew, was evaluated by the previous provider for hallucinations and paranoia  Workup Completed:  Labs Reviewed   URINE MICROSCOPIC - Abnormal       Result Value Ref Range Status    RBC, UA None Seen  None Seen, 0-5 /hpf Final    WBC, UA 2-4 (*) None Seen, 0-5, 5-55, 5-65 /hpf Final    Epithelial Cells Occasional  None Seen, Occasional /hpf Final    Bacteria, UA Innumerable (*) None Seen, Occasional /hpf Final    Ca Oxalate Da[place], UA Occasional (*) None Seen /hpf Final   BASIC METABOLIC PANEL - Abnormal    Sodium 142  136 - 145 mmol/L Final    Potassium 4 1  3 5 - 5 3 mmol/L Final    Comment: Slightly Hemolyzed; Results May be Affected    Chloride 107  100 - 108 mmol/L Final    CO2 28  21 - 32 mmol/L Final    ANION GAP 7  4 - 13 mmol/L Final    BUN 15  5 - 25 mg/dL Final    Creatinine 1 33 (*) 0 60 - 1 30 mg/dL Final    Comment: Standardized to IDMS reference method    Glucose 95  65 - 140 mg/dL Final    Comment: If the patient is fasting, the ADA then defines impaired fasting glucose as > 100 mg/dL and diabetes as > or equal to 123 mg/dL  Specimen collection should occur prior to Sulfasalazine administration due to the potential for falsely depressed results  Specimen collection should occur prior to Sulfapyridine administration due to the potential for falsely elevated results      Calcium 9 4  8 3 - 10 1 mg/dL Final    eGFR 37  ml/min/1 73sq m Final    Narrative:     [place] guidelines for Chronic Kidney Disease (CKD):     Stage 1 with normal or high GFR (GFR > 90 mL/min/1 73 square meters)    Stage 2 Mild CKD (GFR = 60-89 mL/min/1 73 square meters)    Stage 3A Moderate CKD (GFR = 45-59 mL/min/1 73 square meters)    Stage 3B Moderate CKD (GFR = 30-44 mL/min/1 73 square meters)    Stage 4 Severe CKD (GFR = 15-29 mL/min/1 73 square meters)    Stage 5 End Stage CKD (GFR <15 mL/min/1 73 square meters)  Note: GFR calculation is accurate only with a steady state creatinine   CBC AND DIFFERENTIAL - Abnormal    WBC 5 81  4 31 - 10 16 Thousand/uL Final    RBC 3 57 (*) 3 81 - 5 12 Million/uL Final    Hemoglobin 11 4 (*) 11 5 - 15 4 g/dL Final    Hematocrit 36 9  34 8 - 46 1 % Final     (*) 82 - 98 fL Final    MCH 31 9  26 8 - 34 3 pg Final    MCHC 30 9 (*) 31 4 - 37 4 g/dL Final    RDW 14 0  11 6 - 15 1 % Final    MPV 10 7  8 9 - 12 7 fL Final    Platelets 908  841 - 390 Thousands/uL Final    nRBC 0  /100 WBCs Final    Neutrophils Relative 67  43 - 75 % Final    Immat GRANS % 1  0 - 2 % Final    Lymphocytes Relative 20  14 - 44 % Final    Monocytes Relative 9  4 - 12 % Final    Eosinophils Relative 2  0 - 6 % Final    Basophils Relative 1  0 - 1 % Final    Neutrophils Absolute 3 92  1 85 - 7 62 Thousands/µL Final    Immature Grans Absolute 0 03  0 00 - 0 20 Thousand/uL Final    Lymphocytes Absolute 1 18  0 60 - 4 47 Thousands/µL Final    Monocytes Absolute 0 50  0 17 - 1 22 Thousand/µL Final    Eosinophils Absolute 0 13  0 00 - 0 61 Thousand/µL Final    Basophils Absolute 0 05  0 00 - 0 10 Thousands/µL Final   PROTIME-INR - Abnormal    Protime 29 3 (*) 11 6 - 14 5 seconds Final    INR 2 85 (*) 0 84 - 1 19 Final   URINE MACROSCOPIC, POC - Abnormal    Color, UA Yellow   Final    Clarity, UA Cloudy   Final    pH, UA 5 0  4 5 - 8 0 Final    Leukocytes, UA Negative  Negative Final    Nitrite, UA Positive (*) Negative Final    Protein, UA Negative  Negative mg/dl Final    Glucose, UA Negative  Negative mg/dl Final    Ketones, UA Negative  Negative mg/dl Final    Urobilinogen, UA 0 2  0 2, 1 0 E U /dl E U /dl Final    Bilirubin, UA Negative  Negative Final    Blood, UA Negative  Negative Final    Specific Gravity, UA >=1 030  1 003 - 1 030 Final    Narrative:     CLINITEK RESULT   NOVEL CORONAVIRUS (COVID-19), [facility] Normal SARS-CoV-2 Negative  Negative Final    Narrative: The specimen collection materials, transport medium, and/or testing methodology utilized in the production of these test results have been proven to be reliable in a limited validation with an abbreviated program under the Emergency Utilization Authorization provided by the FDA  Testing reported as "Presumptive positive" will be confirmed with secondary testing with a reference laboratory to ensure result accuracy  Clinical caution and judgement should be used with the interpretation of these results with consideration of the clinical impression and other laboratory testing  Testing reported as "Positive" or "Negative" has been proven to be accurate according to standard laboratory validation requirements  All testing is performed with control materials showing appropriate reactivity at standard intervals  TROPONIN I - Normal    Troponin I <0 02  <=0 04 ng/mL Final    Comment: 3Autovalidation override  [employer] Chemistry analyzer 99% cutoff is > 0 04 ng/mL in network labs     o cTnI 99% cutoff is useful only when applied to patients in the clinical setting of myocardial ischemia   o cTnI 99% cutoff should be interpreted in the context of clinical history, ECG findings and possibly cardiac imaging to establish correct diagnosis  o cTnI 99% cutoff may be suggestive but clearly not indicative of a coronary event without the clinical setting of myocardial ischemia  TSH, 3RD GENERATION WITH FREE T4 REFLEX - Normal    TSH 3RD GENERATON 0 686  0 358 - 3 740 uIU/mL Final    Comment: The recommended reference ranges for TSH during pregnancy are as follows:   First trimester 0 1 to 2 5 uIU/mL   Second trimester  0 2 to 3 0 uIU/mL   Third trimester 0 3 to 3 0 uIU/m    Note: Normal ranges may not apply to patients who are transgender, non-binary, or whose legal sex, sex at birth, and gender identity differ    Using supplements with high doses of biotin 20 to more than 300 times greater than the adequate daily intake for adults of 30 mcg/day as established by the [employer], can cause falsely depress results  Narrative:     Patients undergoing fluorescein dye angiography may retain small amounts of fluorescein in the body for 48-72 hours post procedure  Samples containing fluorescein can produce falsely depressed TSH values  If the patient had this procedure,a specimen should be resubmitted post fluorescein clearance  RAPID DRUG SCREEN, URINE - Normal    Amph/Meth UR Negative  Negative Final    Barbiturate Ur Negative  Negative Final    Benzodiazepine Urine Negative  Negative Final    Cocaine Urine Negative  Negative Final    Methadone Urine Negative  Negative Final    Opiate Urine Negative  Negative Final    PCP Ur Negative  Negative Final    THC Urine Negative  Negative Final    Oxycodone Urine Negative  Negative Final    Narrative:     FOR MEDICAL PURPOSES ONLY  IF CONFIRMATION NEEDED PLEASE CONTACT THE LAB WITHIN 5 DAYS  Drug Screen Cutoff Levels:  AMPHETAMINE/METHAMPHETAMINES  1000 ng/mL  BARBITURATES     200 ng/mL  BENZODIAZEPINES     200 ng/mL  COCAINE      300 ng/mL  METHADONE      300 ng/mL  OPIATES      300 ng/mL  PHENCYCLIDINE     25 ng/mL  THC       50 ng/mL  OXYCODONE      100 ng/mL     CT head wo contrast   Final Result      No acute intracranial abnormality  Workstation performed: [id number]               ED Course / Workup Pending (followup):  Pt has been medically cleared and a bed search is in progress  Of note, it does not appear the patient has had a psych/telepsych eval since 8/29  Consult was placed 8/30  Will be unable to obtain at this time, but will inform AM doctor and nursing staff  ED Course as of Sep 01 1353   Mon Aug 31, 2020   0130 Assumed care of patient   201 signed, bedsearch continuing      [phone] Pt having difficulty sleeping        Procedures  MDM    Disposition  Final diagnoses:   Encounter for psychological evaluation   Visual hallucinations   Paranoia ([facility] )     Time reflects when diagnosis was documented in both MDM as applicable and the Disposition within this note     Time User Action Codes Description Comment    8/29/2020  3:42 PM Marci  Add [Z00 8] Encounter for psychological evaluation     8/29/2020  3:42 PM Marci  Add [R44 1] Visual hallucinations     8/29/2020  3:43 PM Marci  Add [F22] Paranoia ([facility] )     8/31/2020  4:01 PM Brynn Queen Add [Z00 8] Medical clearance for psychiatric admission       ED Disposition     ED Disposition Condition Date/Time Comment    Transfer to [facility] Aug 29, 2020  3:43 PM       MD Documentation      Most Recent Value   Patient Condition  The patient has been stabilized such that within reasonable medical probability, no material deterioration of the patient condition or the condition of the unborn child(javier) is likely to result from the transfer   Reason for Transfer  Level of Care needed not available at this facility   Benefits of Transfer  Specialized equipment and/or services available at the receiving facility (Include comment)________________________   Risks of Transfer  Potential for delay in receiving treatment, Increased discomfort during transfer   Accepting Physician  Dr Lyle Verma Name, [address]    (Name & Tel number)  Jigar Watson ,  [phone]   Transported by (Company and Unit #)  [facility]   Sending MD DR NARCISA AZUL   Provider Certification  General risk, such as traffic hazards, adverse weather conditions, rough terrain or turbulence, possible failure of equipment (including vehicle or aircraft), or consequences of actions of persons outside the control of the transport personnel      RN Documentation      Most [facility] Name, [address] [facility]    (Name & Tel number)  Millicent Mast ,  [phone]   Transport Mode  Ambulance   [facility]   Level of Care  Basic life support      Follow-up Information    None       Discharge Medication List as of 8/31/2020  9:04 PM      CONTINUE these medications which have NOT CHANGED    Details   ASPIRIN 81 PO Take 81 mg by mouth daily, Starting Wed 7/22/2020, Historical Med      famotidine (PEPCID) 20 mg tablet Take 20 mg by mouth 2 (two) times a day, Historical Med      folic acid (FOLVITE) 1 mg tablet Take 1 mg by mouth daily, Historical Med      furosemide (LASIX) 40 mg tablet Take 40 mg by mouth every other day, Historical Med      glimepiride (AMARYL) 2 mg tablet Take 1 mg by mouth 2 (two) times a day, Historical Med      levothyroxine 100 mcg tablet Take 100 mcg by mouth daily, Historical Med      metoprolol tartrate (LOPRESSOR) 50 mg tablet Take 50 mg by mouth 2 (two) times a day, Starting Fri 8/7/2020, Historical Med      potassium chloride (K-DUR,KLOR-CON) 20 mEq tablet Take 20 mEq by mouth daily, Historical Med      sertraline (ZOLOFT) 25 mg tablet Take 25 mg by mouth daily, Starting Fri 5/15/2020, Until Sat 5/15/2021, Historical Med      vitamin B-12 (VITAMIN B-12) 1,000 mcg tablet Take 1,000 mcg by mouth daily, Historical Med      warfarin (COUMADIN) 5 mg tablet Take 5 mg by mouth daily, Starting Fri 5/15/2020, Historical Med      fexofenadine (ALLEGRA) 180 MG tablet Take 180 mg by mouth daily, Historical Med      nitroglycerin (NITROSTAT) 0 4 mg SL tablet Place 0 4 mg under the tongue every 5 (five) minutes as needed for chest pain, Historical Med      omalizumab (Xolair) 150 mg 150 mg by Subcutaneous (multi inj) route every 28 days, Historical Med           No discharge procedures on file         ED Provider  Electronically Signed by     Adelaide Issa DO  09/01/20 9311

## 2020-09-01 PROBLEM — I48.0 PAROXYSMAL ATRIAL FIBRILLATION (HCC): Status: ACTIVE | Noted: 2020-09-01

## 2020-09-01 PROBLEM — E03.9 ACQUIRED HYPOTHYROIDISM: Status: ACTIVE | Noted: 2020-09-01

## 2020-09-01 PROBLEM — E11.9 DIABETES MELLITUS TYPE 2, NONINSULIN DEPENDENT (HCC): Status: ACTIVE | Noted: 2020-09-01

## 2020-09-01 PROBLEM — I25.10 CORONARY ARTERY DISEASE INVOLVING NATIVE CORONARY ARTERY OF NATIVE HEART WITHOUT ANGINA PECTORIS: Status: ACTIVE | Noted: 2020-09-01

## 2020-09-01 PROBLEM — R44.1 VISUAL HALLUCINATIONS: Status: ACTIVE | Noted: 2020-09-01

## 2020-09-01 PROBLEM — F03.90 MAJOR NEUROCOGNITIVE DISORDER (HCC): Chronic | Status: ACTIVE | Noted: 2020-09-01

## 2020-09-01 PROBLEM — I25.5 ISCHEMIC CARDIOMYOPATHY: Status: ACTIVE | Noted: 2020-09-01

## 2020-09-01 PROBLEM — Z00.8 MEDICAL CLEARANCE FOR PSYCHIATRIC ADMISSION: Status: ACTIVE | Noted: 2020-09-01

## 2020-09-01 PROBLEM — F29 PSYCHOTIC DISORDER (HCC): Status: ACTIVE | Noted: 2020-09-01

## 2020-09-01 LAB
BACTERIA UR QL AUTO: ABNORMAL /HPF
BILIRUB UR QL STRIP: NEGATIVE
CLARITY UR: ABNORMAL
COLOR UR: ABNORMAL
GLUCOSE UR STRIP-MCNC: NEGATIVE MG/DL
HGB UR QL STRIP.AUTO: NEGATIVE
INR PPP: 3.11 (ref 0.84–1.19)
KETONES UR STRIP-MCNC: NEGATIVE MG/DL
LEUKOCYTE ESTERASE UR QL STRIP: 100
NITRITE UR QL STRIP: NEGATIVE
NON-SQ EPI CELLS URNS QL MICRO: ABNORMAL /HPF
PH UR STRIP.AUTO: 6 [PH]
PROT UR STRIP-MCNC: NEGATIVE MG/DL
PROTHROMBIN TIME: 32 SECONDS (ref 11.6–14.5)
RBC #/AREA URNS AUTO: ABNORMAL /HPF
SP GR UR STRIP.AUTO: 1.01 (ref 1–1.04)
UROBILINOGEN UA: NEGATIVE MG/DL
WBC #/AREA URNS AUTO: ABNORMAL /HPF

## 2020-09-01 PROCEDURE — 85610 PROTHROMBIN TIME: CPT | Performed by: PSYCHIATRY & NEUROLOGY

## 2020-09-01 PROCEDURE — 81001 URINALYSIS AUTO W/SCOPE: CPT | Performed by: PSYCHIATRY & NEUROLOGY

## 2020-09-01 PROCEDURE — 81003 URINALYSIS AUTO W/O SCOPE: CPT | Performed by: PSYCHIATRY & NEUROLOGY

## 2020-09-01 PROCEDURE — 99253 IP/OBS CNSLTJ NEW/EST LOW 45: CPT | Performed by: INTERNAL MEDICINE

## 2020-09-01 PROCEDURE — 99222 1ST HOSP IP/OBS MODERATE 55: CPT | Performed by: PSYCHIATRY & NEUROLOGY

## 2020-09-01 RX ORDER — SERTRALINE HYDROCHLORIDE 25 MG/1
25 TABLET, FILM COATED ORAL DAILY
Status: DISCONTINUED | OUTPATIENT
Start: 2020-09-02 | End: 2020-09-04

## 2020-09-01 RX ORDER — WARFARIN SODIUM 2 MG/1
2 TABLET ORAL
Status: DISCONTINUED | OUTPATIENT
Start: 2020-09-01 | End: 2020-09-08

## 2020-09-01 RX ORDER — ARIPIPRAZOLE 5 MG/1
5 TABLET ORAL DAILY
Status: DISCONTINUED | OUTPATIENT
Start: 2020-09-01 | End: 2020-09-03

## 2020-09-01 RX ADMIN — METOPROLOL TARTRATE 25 MG: 25 TABLET, FILM COATED ORAL at 09:12

## 2020-09-01 RX ADMIN — METOPROLOL TARTRATE 25 MG: 25 TABLET, FILM COATED ORAL at 21:33

## 2020-09-01 RX ADMIN — ARIPIPRAZOLE 5 MG: 5 TABLET ORAL at 17:22

## 2020-09-01 RX ADMIN — WARFARIN SODIUM 2 MG: 2 TABLET ORAL at 17:14

## 2020-09-01 RX ADMIN — ASPIRIN 81 MG: 81 TABLET, COATED ORAL at 09:14

## 2020-09-01 RX ADMIN — FUROSEMIDE 40 MG: 40 TABLET ORAL at 09:13

## 2020-09-01 RX ADMIN — GLIMEPIRIDE 2 MG: 2 TABLET ORAL at 09:14

## 2020-09-01 RX ADMIN — POTASSIUM CHLORIDE 20 MEQ: 20 TABLET, EXTENDED RELEASE ORAL at 09:12

## 2020-09-01 RX ADMIN — FOLIC ACID 1 MG: 1 TABLET ORAL at 09:13

## 2020-09-01 RX ADMIN — FAMOTIDINE 20 MG: 20 TABLET ORAL at 09:13

## 2020-09-01 RX ADMIN — LEVOTHYROXINE SODIUM 100 MCG: 100 TABLET ORAL at 07:24

## 2020-09-01 NOTE — ASSESSMENT & PLAN NOTE
 Patient is medically cleared for admission to [facility] and treatment of underlying psychiatric illness
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
## 2020-09-01 NOTE — NURSING NOTE
Pt is visible on the unit and out for meals  Pt is calm and cooperative with care   Pt is medication compliant and ambulates with a walker  Pt ate 100% at dinner  and VSS  Pt denies s/s , will continue to monitor
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
## 2020-09-01 NOTE — TREATMENT PLAN
TREATMENT PLAN REVIEW - [address] [facility] 82 y o  1935 female MRN: [id number]    [address] [facility] [address] [place], [place] [address]U [id number] Encounter: [id number]          Admit Date/Time:  8/31/2020  9:16 PM    Treatment Team: Attending Provider: Law Alan MD; Patient Care Technician: Arian Whitfield; Patient Care Assistant: Ridge Bryant; Patient Care Assistant: Hanna Santos; Patient Care Technician: Rach Lopez; Patient Care Assistant: Jennifer Roth; Medical Student: Asiya Christian; : Doron Nesbitt; Occupational Therapy Assistant: Calista Meigs, COTA; Registered Nurse: Tulio Lagos    Diagnosis: Principal Problem:    Psychotic disorder ([facility] )  Active Problems:    Diabetes mellitus type 2, noninsulin dependent ([facility] )    Medical clearance for psychiatric admission    Acquired hypothyroidism    Coronary artery disease involving native coronary artery of native heart without angina pectoris    Paroxysmal atrial fibrillation ([place] [address] )    Ischemic cardiomyopathy    Major neurocognitive disorder [facility])      Patient Strengths/Assets: family ties    Patient Barriers/Limitations: impaired cognition    Short Term Goals: decrease in psychotic symptoms    Long Term Goals: resolution of psychotic symptoms    Progress Towards Goals: starting psychiatric medications as prescribed    Recommended Treatment: medication management, patient medication education, group therapy, milieu therapy, continued Behavioral Health psychiatric evaluation/assessment process    Treatment Frequency: daily medication monitoring, group and milieu therapy daily, monitoring through interdisciplinary rounds, monitoring through weekly patient care conferences    Expected Discharge Date:   In 7 days    Discharge Plan: discharge to home    Treatment Plan Created/Updated By: Law Alan MD

## 2020-09-01 NOTE — PROGRESS NOTES
09/01/20 1032   Team Meeting   Meeting Type Tx Team Meeting   Initial Conference Date 09/01/20   Team Members Present   Team Members Present Physician;Nurse;   Physician Team Member [facility] Team Member [facility] Team Member Violetta   Patient/Family Present   Patient Present Yes   Patient's Family Present No     Treatment goals include: decreasing psychosis symptoms (VH), decreasing depression and anxiety, building effective coping skills, remaining free from falling, discharge planning
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
## 2020-09-01 NOTE — PLAN OF CARE
Problem: Alteration in Thoughts and Perception  Goal: Verbalize thoughts and feelings  Description: Interventions:  - Promote a nonjudgmental and trusting relationship with the patient through active listening and therapeutic communication  - Assess patient's level of functioning, behavior and potential for risk  - Engage patient in 1 on 1 interactions  - Encourage patient to express fears, feelings, frustrations, and discuss symptoms    - [place] patient to reality, help patient recognize reality-based thinking   - Administer medications as ordered and assess for potential side effects  - Provide the patient education related to the signs and symptoms of the illness and desired effects of prescribed medications  Outcome: Progressing  Goal: Refrain from acting on delusional thinking/internal stimuli  Description: Interventions:  - Monitor patient closely, per order   - Utilize least restrictive measures   - Set reasonable limits, give positive feedback for acceptable   - Administer medications as ordered and monitor of potential side effects  Outcome: Progressing  Goal: Agree to be compliant with medication regime, as prescribed and report medication side effects  Description: Interventions:  - Offer appropriate PRN medication and supervise ingestion; conduct AIMS, as needed   Outcome: Progressing  Goal: Recognize dysfunctional thoughts, communicate reality-based thoughts at the time of discharge  Description: Interventions:  - Provide medication and psycho-education to assist patient in compliance and developing insight into his/her illness   Outcome: Progressing  Goal: Complete daily ADLs, including personal hygiene independently, as able  Description: Interventions:  - Observe, teach, and assist patient with ADLS  - Monitor and promote a balance of rest/activity, with adequate nutrition and elimination   Outcome: Progressing     Problem: Ineffective Coping  Goal: Cooperates with admission process  Description: Interventions:   - Complete admission process  Outcome: Progressing  Goal: Participates in unit activities  Description: Interventions:  - Provide therapeutic environment   - Provide required programming   - Redirect inappropriate behaviors   Outcome: Progressing  Goal: Understands least restrictive measures  Description: Interventions:  - Utilize least restrictive behavior  Outcome: Progressing  Goal: Free from restraint events  Description: - Utilize least restrictive measures   - Provide behavioral interventions   - Redirect inappropriate behaviors   Outcome: Progressing     Problem: Depression  Goal: Treatment Goal: Demonstrate behavioral control of depressive symptoms, verbalize feelings of improved mood/affect, and adopt new coping skills prior to discharge  Outcome: Progressing  Goal: Verbalize thoughts and feelings  Description: Interventions:  - Assess and re-assess patient's level of risk   - Engage patient in 1:1 interactions, daily, for a minimum of 15 minutes   - Encourage patient to express feelings, fears, frustrations, hopes   Outcome: Progressing  Goal: Refrain from harming self  Description: Interventions:  - Monitor patient closely, per order   - Supervise medication ingestion, monitor effects and side effects   Outcome: Progressing  Goal: Refrain from isolation  Description: Interventions:  - Develop a trusting relationship   - Encourage socialization   Outcome: Progressing  Goal: Refrain from self-neglect  Outcome: Progressing  Goal: Attend and participate in unit activities, including therapeutic, recreational, and educational groups  Description: Interventions:  - Provide therapeutic and educational activities daily, encourage attendance and participation, and document same in the medical record   Outcome: Progressing  Goal: Complete daily ADLs, including personal hygiene independently, as able  Description: Interventions:  - Observe, teach, and assist patient with ADLS  -  Monitor and promote a balance of rest/activity, with adequate nutrition and elimination   Outcome: Progressing     Problem: Anxiety  Goal: Anxiety is at manageable level  Description: Interventions:  - Assess and monitor patient's anxiety level  - Monitor for signs and symptoms (heart palpitations, chest pain, shortness of breath, headaches, nausea, feeling jumpy, restlessness, irritable, apprehensive)  - Collaborate with interdisciplinary team and initiate plan and interventions as ordered  - [place] patient to unit/surroundings  - Explain treatment plan  - Encourage participation in care  - Encourage verbalization of concerns/fears  - Identify coping mechanisms  - Assist in developing anxiety-reducing skills  - Administer/offer alternative therapies  - Limit or eliminate stimulants  Outcome: Progressing     Problem: Potential for Falls  Goal: Patient will remain free of falls  Description: INTERVENTIONS:  - Assess patient frequently for physical needs  -  Identify cognitive and physical deficits and behaviors that affect risk of falls    -  [place] fall precautions as indicated by assessment   - Educate patient/family on patient safety including physical limitations  - Instruct patient to call for assistance with activity based on assessment  - Modify environment to reduce risk of injury  - Consider OT/PT consult to assist with strengthening/mobility  Outcome: Progressing     Problem: Alteration in Thoughts and Perception  Goal: Treatment Goal: Gain control of psychotic behaviors/thinking, reduce/eliminate presenting symptoms and demonstrate improved reality functioning upon discharge  Outcome: Not Progressing  Goal: Attend and participate in unit activities, including therapeutic, recreational, and educational groups  Description: Interventions:  -Encourage Visitation and family involvement in care  Outcome: Not Progressing     Problem: Ineffective Coping  Goal: Identifies ineffective coping skills  Outcome: Not Progressing  Goal: Identifies healthy coping skills  Outcome: Not Progressing  Goal: Demonstrates healthy coping skills  Outcome: Not Progressing  Goal: Patient/Family participate in treatment and DC plans  Description: Interventions:  - Provide therapeutic environment  Outcome: Not Progressing  Goal: Patient/Family verbalizes awareness of resources  Outcome: Not Progressing

## 2020-09-01 NOTE — PLAN OF CARE
Minimally engaged in groups    Problem: Ineffective Coping  Goal: Participates in unit activities  Description: Interventions:  - Provide therapeutic environment   - Provide required programming   - Redirect inappropriate behaviors   Outcome: Progressing

## 2020-09-01 NOTE — PLAN OF CARE
Problem: Alteration in Thoughts and Perception  Goal: Treatment Goal: Gain control of psychotic behaviors/thinking, reduce/eliminate presenting symptoms and demonstrate improved reality functioning upon discharge  Outcome: Progressing  Goal: Verbalize thoughts and feelings  Description: Interventions:  - Promote a nonjudgmental and trusting relationship with the patient through active listening and therapeutic communication  - Assess patient's level of functioning, behavior and potential for risk  - Engage patient in 1 on 1 interactions  - Encourage patient to express fears, feelings, frustrations, and discuss symptoms    - [place] patient to reality, help patient recognize reality-based thinking   - Administer medications as ordered and assess for potential side effects  - Provide the patient education related to the signs and symptoms of the illness and desired effects of prescribed medications  Outcome: Progressing  Goal: Refrain from acting on delusional thinking/internal stimuli  Description: Interventions:  - Monitor patient closely, per order   - Utilize least restrictive measures   - Set reasonable limits, give positive feedback for acceptable   - Administer medications as ordered and monitor of potential side effects  Outcome: Progressing  Goal: Agree to be compliant with medication regime, as prescribed and report medication side effects  Description: Interventions:  - Offer appropriate PRN medication and supervise ingestion; conduct AIMS, as needed   Outcome: Progressing  Goal: Attend and participate in unit activities, including therapeutic, recreational, and educational groups  Description: Interventions:  -Encourage Visitation and family involvement in care  Outcome: Progressing  Goal: Recognize dysfunctional thoughts, communicate reality-based thoughts at the time of discharge  Description: Interventions:  - Provide medication and psycho-education to assist patient in compliance and developing insight into his/her illness   Outcome: Progressing  Goal: Complete daily ADLs, including personal hygiene independently, as able  Description: Interventions:  - Observe, teach, and assist patient with ADLS  - Monitor and promote a balance of rest/activity, with adequate nutrition and elimination   Outcome: Progressing     Problem: Ineffective Coping  Goal: Cooperates with admission process  Description: Interventions:   - Complete admission process  Outcome: Progressing  Goal: Identifies ineffective coping skills  Outcome: Progressing  Goal: Identifies healthy coping skills  Outcome: Progressing  Goal: Demonstrates healthy coping skills  Outcome: Progressing  Goal: Participates in unit activities  Description: Interventions:  - Provide therapeutic environment   - Provide required programming   - Redirect inappropriate behaviors   Outcome: Progressing  Goal: Patient/Family participate in treatment and DC plans  Description: Interventions:  - Provide therapeutic environment  Outcome: Progressing  Goal: Patient/Family verbalizes awareness of resources  Outcome: Progressing  Goal: Understands least restrictive measures  Description: Interventions:  - Utilize least restrictive behavior  Outcome: Progressing  Goal: Free from restraint events  Description: - Utilize least restrictive measures   - Provide behavioral interventions   - Redirect inappropriate behaviors   Outcome: Progressing     Problem: Depression  Goal: Treatment Goal: Demonstrate behavioral control of depressive symptoms, verbalize feelings of improved mood/affect, and adopt new coping skills prior to discharge  Outcome: Progressing  Goal: Verbalize thoughts and feelings  Description: Interventions:  - Assess and re-assess patient's level of risk   - Engage patient in 1:1 interactions, daily, for a minimum of 15 minutes   - Encourage patient to express feelings, fears, frustrations, hopes   Outcome: Progressing  Goal: Refrain from harming self  Description: Interventions:  - Monitor patient closely, per order   - Supervise medication ingestion, monitor effects and side effects   Outcome: Progressing  Goal: Refrain from isolation  Description: Interventions:  - Develop a trusting relationship   - Encourage socialization   Outcome: Progressing  Goal: Refrain from self-neglect  Outcome: Progressing  Goal: Attend and participate in unit activities, including therapeutic, recreational, and educational groups  Description: Interventions:  - Provide therapeutic and educational activities daily, encourage attendance and participation, and document same in the medical record   Outcome: Progressing  Goal: Complete daily ADLs, including personal hygiene independently, as able  Description: Interventions:  - Observe, teach, and assist patient with ADLS  -  Monitor and promote a balance of rest/activity, with adequate nutrition and elimination   Outcome: Progressing     Problem: Anxiety  Goal: Anxiety is at manageable level  Description: Interventions:  - Assess and monitor patient's anxiety level  - Monitor for signs and symptoms (heart palpitations, chest pain, shortness of breath, headaches, nausea, feeling jumpy, restlessness, irritable, apprehensive)  - Collaborate with interdisciplinary team and initiate plan and interventions as ordered  - [place] patient to unit/surroundings  - Explain treatment plan  - Encourage participation in care  - Encourage verbalization of concerns/fears  - Identify coping mechanisms  - Assist in developing anxiety-reducing skills  - Administer/offer alternative therapies  - Limit or eliminate stimulants  Outcome: Progressing     Problem: Potential for Falls  Goal: Patient will remain free of falls  Description: INTERVENTIONS:  - Assess patient frequently for physical needs  -  Identify cognitive and physical deficits and behaviors that affect risk of falls    -  [place] fall precautions as indicated by assessment   - Educate patient/family on patient safety including physical limitations  - Instruct patient to call for assistance with activity based on assessment  - Modify environment to reduce risk of injury  - Consider OT/PT consult to assist with strengthening/mobility  Outcome: Progressing     Problem: DISCHARGE PLANNING  Goal: Discharge to home or other facility with appropriate resources  Description: INTERVENTIONS:  - Identify barriers to discharge w/patient and caregiver  - Arrange for needed discharge resources and transportation as appropriate  - Identify discharge learning needs (meds, wound care, etc )  - Arrange for interpretive services to assist at discharge as needed  - Refer to Case Management Department for coordinating discharge planning if the patient needs post-hospital services based on physician/advanced practitioner order or complex needs related to functional status, cognitive ability, or social support system  Outcome: Progressing

## 2020-09-01 NOTE — CMS CERTIFICATION NOTE
Recertification: Based upon physical, mental and social evaluations, I certify that inpatient psychiatric services continue to be medically necessary for this patient for a duration of 12 midnights for the treatment of  Psychotic disorder [facility])   Available alternative community resources still do not meet the patient's mental health care needs  I further attest that an established written individualized plan of care has been updated and is outlined in the patient's medical records

## 2020-09-01 NOTE — CONSULTS
Consult- Jose Pod 1935, 82 y o  female MRN: [id number]  Unit/Bed#: [facility] 266-02 Encounter: [id number]  Primary Care Provider: No primary care provider on file  Date and time admitted to hospital: 8/31/2020  9:16 PM    Inpatient consult for Medical Clearance for [address] patient  Consult performed by: Jared Bowie PA-C  Consult ordered by: Patrica Kate MD        Major neurocognitive disorder [facility])  Assessment & Plan  Acute change in mental status seen by neurologist recommended to see psychiatrist according to patient multiple thought process is coming in her brain she did know what to do never had prior psych disorder now admitted in psych unit treatment per psychiatrist    Ischemic cardiomyopathy  Assessment & Plan  Patient euvolemic but does complain short of breath trace edema present    Paroxysmal atrial fibrillation ([facility] [address] )  Assessment & Plan  · Anticoagulated on Coumadin  · INR slightly supratherapeutic @ 3 11 (09/01/20)  Goal INR is 2 0-3 0  · Hold Coumadin tonight and recheck INR tomorrow morning  · Check INR weekly once therapeutic  · Rate controlled on metoprolol  · Decrease Coumadin dose 3 mg today get INR tomorrow    Coronary artery disease involving native coronary artery of native heart without angina pectoris  Assessment & Plan  · History of CABG    Acquired hypothyroidism  Assessment & Plan  · TSH normal @ 0 686  · Continue Synthroid 100 mcg daily  Medical clearance for psychiatric admission  Assessment & Plan   Patient is medically cleared for admission to [facility] and treatment of underlying psychiatric illness  Diabetes mellitus type 2, noninsulin dependent [facility])  Assessment & Plan  Lab Results   Component Value Date    HGBA1C 6 2 (H) 07/16/2020     · Maintained on Amaryl and diet as an out-patient  · Sugar well controlled    * [facility]  Assessment & Plan  · Patient lives alone and has been experiencing anxiety, visual hallucinations and paranoia    · Per psychiatry  ECT Clearance:   History of recent seizure or stroke:  None   History of pheochromocytoma:  Negative   History of active bleeding (Intracranial hemorrhage, aneurysm or AVM):  None   History of metallic implants in the head or neck:  Not known   History of increased intracranial pressure with mass effect:  No   Does the patient have a current arrhythmia? Yes patient is in AFib rate controlled      Based on above criteria, Patient needs cardiology consult for ECT clearance medically cleared for ECT should it be recommended  Due to AFib    Counseling / Coordination of Care Time: 45 minutes  Greater than 50% of total time spent on patient counseling and coordination of care  Collaboration of Care: Were Recommendations Directly Discussed with Primary Treatment Team? - No     History of Present Illness:    Lyn Sanchez is a 82 y o  female who is originally admitted to the psychiatry service due to psych disorder  We are consulted for medical clearance for admission to [facility] Unit and treatment of underlying psychiatric illness  Medical management    Review of Systems:    Review of Systems   Constitutional: Positive for activity change, appetite change and fatigue  Negative for chills and fever  HENT: Negative for hearing loss, sore throat and trouble swallowing  Eyes: Negative for photophobia, discharge and visual disturbance  Respiratory: Positive for shortness of breath  Negative for chest tightness  Cardiovascular: Positive for leg swelling  Negative for chest pain and palpitations  Gastrointestinal: Positive for abdominal pain and diarrhea  Negative for blood in stool and vomiting  Endocrine: Negative for polydipsia and polyuria  Genitourinary: Negative for difficulty urinating, dysuria, flank pain and hematuria  Musculoskeletal: Positive for arthralgias and gait problem  Negative for back pain  Skin: Negative for rash     Allergic/Immunologic: Negative for environmental allergies and food allergies  Neurological: Positive for headaches  Negative for dizziness, seizures and syncope  Hematological: Does not bruise/bleed easily  Psychiatric/Behavioral: Positive for confusion  Negative for behavioral problems  All other systems reviewed and are negative  Past Medical and Surgical History:     Past Medical History:   Diagnosis Date    Diabetes mellitus ([facility] [address] )     Disease of thyroid gland     [facility]     High cholesterol     Hypertension        No past surgical history on file  Meds/Allergies:    all medications and allergies reviewed    Allergies: Allergies   Allergen Reactions    Shellfish-Derived Products Anaphylaxis    Adhesive [Medical Tape]     Amoxicillin     Ancef [Cefazolin]     Cephalosporins     Dofetilide      Cannot take generic    Epinephrine     Erythromycin     Iodine     Levofloxacin     Losartan     Morphine     Other      seafood    Oxycodone     Penicillins     Percocet [Oxycodone-Acetaminophen]     Pineapple     Thorazine [Chlorpromazine]        Social History:     Marital Status: Single    Substance Use History:   Social History     Substance and Sexual Activity   Alcohol Use Never    Frequency: Never     Social History     Tobacco Use   Smoking Status Never Smoker     Social History     Substance and Sexual Activity   Drug Use Never       Family History:    non-contributory    Physical Exam:     Vitals:   Blood Pressure: 138/66 (09/01/20 0732)  Pulse: 74 (09/01/20 0732)  Temperature: 97 8 °F (36 6 °C) (09/01/20 0732)  Temp Source: Tympanic (09/01/20 0732)  Respirations: 16 (09/01/20 0732)  Height: 5' (152 4 cm) (08/31/20 2138)  Weight - Scale: 88 4 kg (194 lb 14 2 oz) (09/01/20 0700)  SpO2: 94 % (09/01/20 0732)    Physical Exam  Vitals signs and nursing note reviewed  Constitutional:       Appearance: Normal appearance  HENT:      Head: Normocephalic and atraumatic        Right Ear: External ear normal       Left Ear: External ear normal       Nose: Nose normal       Mouth/Throat:      Mouth: Mucous membranes are moist    Eyes:      Conjunctiva/sclera: Conjunctivae normal    Neck:      Musculoskeletal: Normal range of motion and neck supple  Cardiovascular:      Rate and Rhythm: Normal rate  Rhythm irregular  Pulmonary:      Effort: Pulmonary effort is normal       Breath sounds: Normal breath sounds  Abdominal:      General: Bowel sounds are normal       Palpations: Abdomen is soft  Musculoskeletal:      Right lower leg: Edema present  Left lower leg: Edema present  Comments: Trace edema present   Skin:     General: Skin is warm and dry  Capillary Refill: Capillary refill takes less than 2 seconds  Neurological:      General: No focal deficit present  Mental Status: She is alert and oriented to person, place, and time  Psychiatric:         Mood and Affect: Mood normal          Behavior: Behavior normal          Additional Data:     Lab Results: I have personally reviewed pertinent reports  Results from last 7 days   Lab Units 08/29/20  1308   WBC Thousand/uL 5 81   HEMOGLOBIN g/dL 11 4*   HEMATOCRIT % 36 9   PLATELETS Thousands/uL 180   NEUTROS PCT % 67   LYMPHS PCT % 20   MONOS PCT % 9   EOS PCT % 2     Results from last 7 days   Lab Units 08/29/20  1308   SODIUM mmol/L 142   POTASSIUM mmol/L 4 1   CHLORIDE mmol/L 107   CO2 mmol/L 28   BUN mg/dL 15   CREATININE mg/dL 1 33*   ANION GAP mmol/L 7   CALCIUM mg/dL 9 4   GLUCOSE RANDOM mg/dL 95     Results from last 7 days   Lab Units 09/01/20  0530   INR  3 11*     Results from last 7 days   Lab Units 08/29/20  1308   TROPONIN I ng/mL <0 02     Lab Results   Component Value Date/Time    HGBA1C 6 2 (H) 07/16/2020 10:15 AM    HGBA1C 6 8 (H) 04/15/2020 09:59 AM    HGBA1C 6 2 (H) 12/30/2019 09:53 AM     EKG, Pathology, and Other Studies Reviewed on Admission:   · EKG (08/29/20):  Atrial fibrillation with RBBB and T wave abnormality  QTC: 453    ** Please Note: This note has been constructed using a voice recognition system   **

## 2020-09-01 NOTE — ASSESSMENT & PLAN NOTE
Acute change in mental status seen by neurologist recommended to see psychiatrist according to patient multiple thought process is coming in her brain she did know what to do never had prior psych disorder now admitted in psych unit treatment per psychiatrist

## 2020-09-01 NOTE — ASSESSMENT & PLAN NOTE
· Anticoagulated on Coumadin  · INR slightly supratherapeutic @ 3 11 (09/01/20)  Goal INR is 2 0-3 0  · Hold Coumadin tonight and recheck INR tomorrow morning  · Check INR weekly once therapeutic  · Rate controlled on metoprolol    · Decrease Coumadin dose 3 mg today get INR tomorrow

## 2020-09-01 NOTE — PROGRESS NOTES
09/01/20 0851   Team Meeting   Meeting Type Daily Rounds   Initial Conference Date 09/01/20   Team Members Present   Team Members Present Physician;Nurse;;Occupational Therapist   Physician Team Member Dr Dagmar García Team Member Sushil Sanchez RN; Perez Zavala Management Team Member Kike Fountain   OT Team Member Danelle Amanda   Patient/Family Present   Patient Present No   Patient's Family Present No     201 admission for [facility] and anxiety  Pt lives alone  Hx of dementia per pt's daughter  Slept well   Pleasant and cooperative

## 2020-09-01 NOTE — CASE MANAGEMENT
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 for the same symptoms  Pt reports that her VH started on May 3rd  She and her daughter were trying to find an OP psych and was told the best way to be set up with OP services was to come to hospital for IP psych tx and be referred  Pt was hospitalized at an outside facility and was started on Zoloft and B12, which initially helped and pt stopped having VH  However, the [facility] returned and IP tx was sought out  Pt was hospitalized in a [facility] facility for 4 days  The doctor released pt because pt's daughter was in the ICU for pneumonia and pt wanted to be with her  Medical: Pt wears glasses and dentures; no hearings aides  Pt uses walker and cane to ambulate safely at home  PMH of HTN, high cholesterol, thyroid disease, multiple heart attacks, bypass surgery  Pt reports that she's independent with personal care at home  Pt also reports that she has HHA that visits her 2x/week on  and   The HHA helps with groceries, laundry, housecleaning, etc  Pt signed up for a program in which her meds will be bubble-packed and delivered to her apt  Pt's [place] Jenny [place] [facility]       Family: Denies family hx of dementia, D/A, abuse, SI/HI, MH    ROIs signed for: daughter, son, PCP, cardiologist    Stressors: VH of people since May 3rd  Pt cannot hear these people but will see them often in her apt  They've also followed her into her neighbor's apt as well as the hospital    Coping skills/hobbies: Lunch with friends every Thursday (prior to Covid), bingo at her apt building (before Covid), watching TV, reading newspaper    During interview, pt reported that she saw a person sitting in the chair in her room last night when she was going to bed  She reported this to staff to see if the person was really there and was reassured that nobody was in the room  Towards the end of the interview, pt reported that she saw a man sitting behind CM in an armchair  As soon as pt would move her head to look at this person around CM, the person would disappear but then reappear  Pt was tearful stating that she just wants these people to leave her alone  Pt asked CM if other patients come into the hospital because they see things that aren't there -- CM provided reassurance and support

## 2020-09-01 NOTE — ASSESSMENT & PLAN NOTE
Lab Results   Component Value Date    HGBA1C 6 2 (H) 07/16/2020     · Maintained on Amaryl and diet as an out-patient    · Sugar well controlled

## 2020-09-01 NOTE — ASSESSMENT & PLAN NOTE
· Patient lives alone and has been experiencing anxiety, visual hallucinations and paranoia  · Per psychiatry

## 2020-09-01 NOTE — NURSING NOTE
Patient is calm and cooperative, visible on unit  Pt reports visual hallucinations but they are not threatening  Pt is calm and polite and compliant with medications  Will monitor

## 2020-09-01 NOTE — DISCHARGE INSTR - OTHER ORDERS
You are being discharged to: [facility], [facility] [address] [place], [place] [address]      Triggers you have identified during your hospitalization that led to your admission distressed mood include: visual hallucinations  Coping skills you have identified during your hospitalization include: reading newspaper watching TV, and playing bingo  If you are unable to deal with your distressed mood alone, please contact your new outpatient provider, Dr Tiana Prieto  If that is not effective and you continue to have a distressed mood or feel like you're in crisis, please contact 911 and go to the nearest emergency center  [facility] Crisis Intervention: [address] Suicide Prevention Lifeline:  [address] , [address] on Mental Illness ([place]) HELPLINE: 374.479.4584/Website: www peggy org  *Substance Abuse and Mental Health Services Administration([facility]) [facility], which is a confidential, free, 24-hour-a-day, 365-day-a-year, information service for individuals and family members facing mental health and/or substance use disorders  This service provides referrals to local treatment facilities, support groups, and community-based organizations  Callers can also order free publications and other information  Call 1-781.923.4392/Website: www [facility] gov  *[facility] 2-1-1: This is a toll free, confidential, 24-hour-a-day service which connects you to a community  in your area who can help you find services and resources that are available to you locally and provide critical services that can improve and save lives  Call: 80  /Website: https://ysabelVikiporras net/    What you need to know aboutcoronavirus disease 2019 (COVID-19)     What is coronavirus disease 2019 (COVID-19)? Coronavirus disease 2019 (COVID-19) is a respiratory illness that can spread from person to person   The virus that causes COVID-19 is a novel coronavirus that was first identified during an investigation into an outbreak in [place]  Can people in [place]  get COVID-19? Yes  COVID-19 is spreading from person to person in parts of the [place]  Risk of infection with COVID-19 is higher for people who are close contacts of someone known to have COVID-19, for example healthcare workers, or household members  Other people at higher risk for infection are those who live in or have recently been in an area with ongoing spread of COVID-19  Learn more about places with ongoing spread at   [facility]  html#geographic  Have there been cases of COVID-19 in the [place] ?   Yes  The first case of COVID-19 in the [place] was reported on January 21, 2020  The current count of cases of COVID-19 in the [place] is available on [employer] at [facility]  How does COVID-19 spread? The virus that causes COVID-19 probably emerged from an animal source, but is now spreading from person to person  The virus is thought to spread mainly between people who are in close contact with one another (within about 6 feet) through respiratory droplets produced when an infected person coughs or sneezes  It also may be possible that a person can get COVID-19 by touching a surface or object that has the virus on it and then touching their own mouth, nose, or possibly their eyes, but this is not thought to be the main way the virus spreads  Learn what is known about the spread of newly emerged coronaviruses at [facility]  What are the symptoms of COVID-19? Patients with COVID-19 have had mild to severe respiratory illness with symptoms of   fever   cough   shortness of breath  What are severe complications from this virus? Some patients have pneumonia in both lungs, multi-organ failure and in some cases death  How can I help protect myself?    People can help protect themselves from respiratory illness with everyday preventive actions  Avoid close contact with people who are sick  Avoid touching your eyes, nose, and mouth withunwashed hands  Wash your hands often with soap and water for at least 20 seconds  Use an alcohol-based hand  that contains at least 60% alcohol if soap and water are not available  If you are sick, to keep from spreading respiratory illness to others, you should   Stay home when you are sick  Cover your cough or sneeze with a tissue, then throw the tissue in the trash  Clean and disinfect frequently touched objectsand surfaces  What should I do if I recently traveled from an area with ongoing spread of COVID-19? If you have traveled from an affected area, there may be restrictions on your movements for up to 2 weeks  If you develop symptoms during that period (fever, cough, trouble breathing), seek medical advice  Call the office of your health care provider before you go, and tell them about your travel and your symptoms  They will give you instructions on how to get care without exposing other people to your illness  While sick, avoid contact with people, don't go out and delay any travel to reduce the possibility of spreading illness to others  Is there a vaccine? There is currently no vaccine to protect against COVID-19  The best way to prevent infection is to take everyday preventive actions, like avoiding close contact with people who are sick and washing your hands often  Is there a treatment? There is no specific antiviral treatment for COVID-19  People with COVID-19 can seek medical care to helprelieve symptoms    For more information: www cdc gov/DJEHL87TE 221148-T 03/03/2020       What to do if you are sick withcoronavirus disease 2019 (COVID-19)     If you are sick with COVID-19 or suspect you are infected with the virus that causes COVID-19, follow the steps below to help prevent the disease from spreading to people in your home and community  Stay home except to get medical care   You should restrict activities outside your home, except for getting medical care  Do not go to work, school, or public areas  Avoid using public transportation, ride-sharing, or taxis  Separate yourself from other people and animals inyour home  People: As much as possible, you should stay in a specific room and away from other people in your home  Also, you should use a separate bathroom, if available  Animals: Do not handle pets or other animals while sick  See COVID-19 and Animals for more information  Call ahead before visiting your doctor   If you have a medical appointment, call the healthcare provider and tell them that you have or may have COVID-19  This will help the healthcare provider's office take steps to keep other people from getting infected or exposed  Wear a facemask  You should wear a facemask when you are around other people (e g , sharing a room or vehicle) or pets and before you enter a healthcare provider's office  If you are not able to wear a facemask (for example, because it causes trouble breathing), then people who live with you should not stay in the same room with you, or they should wear a facemask if they enteryour room  Cover your coughs and sneezes   Cover your mouth and nose with a tissue when you cough or sneeze  Throw used tissues in a lined trash can; immediately wash your hands with soap and water for at least 20 seconds or clean your hands with an alcohol-based hand  that contains at least 60 to 95% alcohol, covering all surfaces of your hands and rubbing them together until they feel dry  Soap and water should be used preferentially if hands are visibly dirty  Avoid sharing personal household items   You should not share dishes, drinking glasses, cups, eating utensils, towels, or bedding with other people or pets in your home   After using these items, they should be washed thoroughly with soap and water  Clean your hands often  Wash your hands often with soap and water for at least 20 seconds  If soap and water are not available, clean your hands with an alcohol-based hand  that contains at least 60% alcohol, covering all surfaces of your hands and rubbing them together until they feel dry  Soap and water should be used preferentially if hands are visibly dirty  Avoid touching your eyes, nose, and mouth with unwashed hands  Clean all "high-touch" surfaces every day  High touch surfaces include counters, tabletops, doorknobs, bathroom fixtures, toilets, phones, keyboards, tablets, and bedside tables  Also, clean any surfaces that may have blood, stool, or body fluids on them  Use a household cleaning spray or wipe, according to the label instructions  Labels contain instructions for safe and effective use of the cleaning product including precautions you should take when applying the product, such as wearing gloves and making sure you have good ventilation during use of the product  Monitor your symptoms  Seek prompt medical attention if your illness is worsening (e g , difficulty breathing)  Before seeking care, call your healthcare provider and tell them that you have, or are being evaluated for, COVID-19  Put on a facemask before you enter the facility  These steps will help the healthcare provider's office to keep other people in the office or waiting room from getting infectedor exposed  Ask your healthcare provider to call the local or [facility] health department  Persons who are placed under active monitoring or facilitated self-monitoring should follow instructions provided by their local health department or occupational health professionals, as appropriate  If you have a medical emergency and need to call 911, notify the dispatch personnel that you have, or are being evaluated for COVID-19   If possible, put on a facemask before emergency medical services arrive  Discontinuing home isolation  Patients with confirmed COVID-19 should remain under home isolation precautions until the risk of secondary transmission to others is thought to be low  The decision to discontinue home isolation precautions should be made on a case-by-case basis, in consultation with healthcare providers and state and local health departments  For more information: www cdc gov/VFZSF61CV 798058-L 02/24/2020       Stay home when you are sick,except to get medical care  Wash your hands often with soap and water for at least 20 seconds  Cover your cough or sneeze with a tissue, then throw the tissue in the trash  Clean and disinfect frequently touched objects and surfaces  Avoid touching your eyes, nose, and mouth  STOP THE SPREAD OF GERMS  For more information: www cdc gov/COVID19 Avoid close contact with people who are sick  Help prevent the spread of respiratory diseases like COVID-19

## 2020-09-02 ENCOUNTER — APPOINTMENT (INPATIENT)
Dept: NEUROLOGY | Facility: HOSPITAL | Age: 85
DRG: 885 | End: 2020-09-02
Payer: COMMERCIAL

## 2020-09-02 LAB
INR PPP: 2.81 (ref 0.84–1.19)
PROTHROMBIN TIME: 29.6 SECONDS (ref 11.6–14.5)

## 2020-09-02 PROCEDURE — 95816 EEG AWAKE AND DROWSY: CPT

## 2020-09-02 PROCEDURE — 99232 SBSQ HOSP IP/OBS MODERATE 35: CPT | Performed by: PSYCHIATRY & NEUROLOGY

## 2020-09-02 PROCEDURE — 85610 PROTHROMBIN TIME: CPT | Performed by: INTERNAL MEDICINE

## 2020-09-02 RX ADMIN — POTASSIUM CHLORIDE 20 MEQ: 20 TABLET, EXTENDED RELEASE ORAL at 08:46

## 2020-09-02 RX ADMIN — LEVOTHYROXINE SODIUM 100 MCG: 100 TABLET ORAL at 06:27

## 2020-09-02 RX ADMIN — WARFARIN SODIUM 2 MG: 2 TABLET ORAL at 17:09

## 2020-09-02 RX ADMIN — FUROSEMIDE 40 MG: 40 TABLET ORAL at 08:46

## 2020-09-02 RX ADMIN — FAMOTIDINE 20 MG: 20 TABLET ORAL at 08:45

## 2020-09-02 RX ADMIN — ARIPIPRAZOLE 5 MG: 5 TABLET ORAL at 08:46

## 2020-09-02 RX ADMIN — GLIMEPIRIDE 2 MG: 2 TABLET ORAL at 08:45

## 2020-09-02 RX ADMIN — FOLIC ACID 1 MG: 1 TABLET ORAL at 08:46

## 2020-09-02 RX ADMIN — SERTRALINE HYDROCHLORIDE 25 MG: 25 TABLET ORAL at 08:46

## 2020-09-02 RX ADMIN — METOPROLOL TARTRATE 25 MG: 25 TABLET, FILM COATED ORAL at 08:46

## 2020-09-02 RX ADMIN — METOPROLOL TARTRATE 25 MG: 25 TABLET, FILM COATED ORAL at 21:49

## 2020-09-02 RX ADMIN — ASPIRIN 81 MG: 81 TABLET, COATED ORAL at 08:46

## 2020-09-02 NOTE — PLAN OF CARE
Pt quietly sat on the side lines during community meeting this morning and did not attend other groups today    Problem: Ineffective Coping  Goal: Participates in unit activities  Description: Interventions:  - Provide therapeutic environment   - Provide required programming   - Redirect inappropriate behaviors   Outcome: Progressing

## 2020-09-02 NOTE — PROGRESS NOTES
09/02/20 1000 09/02/20 1100 09/02/20 1330   Activity/Group Checklist   Group Community meeting  (unit rules/orientation/"That's It" game ) Other (Comment)  (short term problem solving) Impromptu group (Comment)   Attendance Attended Did not attend Did not attend   Attendance Duration (min) 16-30  --   --    Interactions Interacted appropriately  --   --    Affect/Mood Appropriate;Normal range  --   --    Goals Achieved Able to engage in interactions  --   --

## 2020-09-02 NOTE — QUICK NOTE
Progress Note - Behavioral Health   Argie Pod 82 y o  female MRN: [id number]  Unit/Bed#: Claudeen Cullens 004-58 Encounter: [id number]    Assessment/Plan   Principal Problem:    Psychotic disorder ([place] [address] )  Active Problems:    Diabetes mellitus type 2, noninsulin dependent ([place] [address] )    Medical clearance for psychiatric admission    Acquired hypothyroidism    Coronary artery disease involving native coronary artery of native heart without angina pectoris    Paroxysmal atrial fibrillation (HCC)    Ischemic cardiomyopathy    Major neurocognitive disorder ([place] [address] )      Behavior over the last 24 hours:  improved  Sleep: normal  Appetite: normal  Medication side effects: No  ROS: Pain in back of Right foreleg - not red nor hot    Mental Status Evaluation:  Appearance:  age appropriate and casually dressed   Behavior:  normal   Speech:  normal pitch and normal volume   Mood:  normal   Affect:  normal   Thought Process:  normal   Thought Content:  normal   Perceptual Disturbances: Visual hallucinations   Risk Potential: Suicidal Ideations none  Homicidal Ideations none  Potential for Aggression No   Sensorium:  person, place, time/date and situation   Memory:  recent and remote memory grossly intact   Consciousness:  alert and awake    Attention: attention span and concentration were age appropriate   Insight:  fair   Judgment: fair   Gait/Station: normal gait/station   Motor Activity: no abnormal movements     Progress Toward Goals: Shukri Marvin is calm and well rested today  She has not experienced any hallucinations today nor has she had any adverse reaction to her medications  She does describe a new pain in her right calf that is not accompanied by erythema nor calor  She is calm and curious about when she can return home  Expectation: 3 days w/o hallucinations or when she feels comfortable     Recommended Treatment:  Continue sertraline and aripiprazole  Continue with group therapy, milieu therapy and occupational therapy  Risks, benefits and possible side effects of Medications:   Risks, benefits, and possible side effects of medications explained to patient and patient verbalizes understanding  Medications:   all current active meds have been reviewed and current meds:   Current Facility-Administered Medications   Medication Dose Route Frequency    acetaminophen (TYLENOL) tablet 650 mg  650 mg Oral Q6H PRN    aluminum-magnesium hydroxide-simethicone (MYLANTA) 200-200-20 mg/5 mL oral suspension 30 mL  30 mL Oral Q4H PRN    ARIPiprazole (ABILIFY) tablet 5 mg  5 mg Oral Daily    aspirin (ECOTRIN LOW STRENGTH) EC tablet 81 mg  81 mg Oral Daily    famotidine (PEPCID) tablet 20 mg  20 mg Oral Daily    folic acid (FOLVITE) tablet 1 mg  1 mg Oral Daily    furosemide (LASIX) tablet 40 mg  40 mg Oral Daily    glimepiride (AMARYL) tablet 2 mg  2 mg Oral Daily With Breakfast    haloperidol (HALDOL) tablet 2 5 mg  2 5 mg Oral Q6H PRN    haloperidol lactate (HALDOL) injection 2 5 mg  2 5 mg Intramuscular Q1H PRN    levothyroxine tablet 100 mcg  100 mcg Oral Early Morning    metoprolol tartrate (LOPRESSOR) tablet 25 mg  25 mg Oral Q12H ZACK    polyethylene glycol (MIRALAX) packet 17 g  17 g Oral Daily PRN    potassium chloride (K-DUR,KLOR-CON) CR tablet 20 mEq  20 mEq Oral Daily    sertraline (ZOLOFT) tablet 25 mg  25 mg Oral Daily    warfarin (COUMADIN) tablet 2 mg  2 mg Oral Daily (warfarin)     Labs: I have personally reviewed all pertinent laboratory/tests results     Recent Results (from the past 24 hour(s))   UA w Reflex to Microscopic w Reflex to Culture    Collection Time: 09/01/20  1:10 PM    Specimen: Urine, Clean Catch   Result Value Ref Range    Color, UA Straw Straw, Yellow, Pale Yellow    Clarity, UA Slightly Cloudy (A) Clear, Other    Specific [place], UA 1 010 1 003 - 1 040    pH, UA 6 0 4 5, 5 0, 5 5, 6 0, 6 5, 7 0, 7 5, 8 0    Leukocytes,  0 (A) Negative    Nitrite, UA Negative Negative    Protein, UA Negative Negative mg/dl    Glucose, UA Negative Negative mg/dl    Ketones, UA Negative Negative mg/dl    Bilirubin, UA Negative Negative    Blood, UA Negative Negative    UROBILINOGEN UA Negative 1 0, Negative mg/dL   Urine Microscopic    Collection Time: 09/01/20  1:10 PM   Result Value Ref Range    RBC, UA None Seen None Seen, 0-5 /hpf    WBC, UA 4-10 (A) None Seen, 0-5, 5-55, 5-65 /hpf    Epithelial Cells Occasional None Seen, Occasional /hpf    Bacteria, UA Innumerable (A) None Seen, Occasional /hpf   Protime-INR    Collection Time: 09/02/20  6:36 AM   Result Value Ref Range    Protime 29 6 (H) 11 6 - 14 5 seconds    INR 2 81 (H) 0 84 - 1 19       Counseling / Coordination of Care  Total floor / unit time spent today 25 minutes  Greater than 50% of total time was spent with the patient and / or family counseling and / or coordination of care  A description of the counseling / coordination of care: Discussing continuation of symptoms, care, and completing a cognitive assessment

## 2020-09-02 NOTE — PROGRESS NOTES
09/02/20 0904   Team Meeting   Meeting Type Daily Rounds   Initial Conference Date 09/02/20   Team Members Present   Team Members Present Physician;;Nurse;Occupational Therapist   Physician Team Member Dr Dwight Gary Team Member Michael Tolentino RN; Perez Zavala Management Team Member Gretchen Hilton   OT Team Member Holli Patricia   Patient/Family Present   Patient Present No   Patient's Family Present No     Less paranoid, social, denying s/s, c/o pain in R calf, SLUMS will be completed today, attended 2/3 groups yesterday

## 2020-09-02 NOTE — PROGRESS NOTES
Progress Note - Behavioral Health   Demetrius Ocampo 82 y o  female MRN: [id number]  Unit/Bed#: Sara Gary 216-90 Encounter: [id number]    Assessment/Plan   Principal Problem:    Psychotic disorder ([place] [address] )  Active Problems:    Diabetes mellitus type 2, noninsulin dependent ([place] [address] )    Medical clearance for psychiatric admission    Acquired hypothyroidism    Coronary artery disease involving native coronary artery of native heart without angina pectoris    Paroxysmal atrial fibrillation (HCC)    Ischemic cardiomyopathy    Major neurocognitive disorder ([place] [address] )      Recommended Treatment:   Continue current medications  EEG ordered given patient's recent onset of visual hallucinations in May without history of psychosis in the past    Continue with group therapy, milieu therapy and occupational therapy  Continue frequent safety checks and vitals per unit protocol  Case discussed with treatment team   Risks, benefits and possible side effects of Medications: Risks, benefits, and possible side effects of medications have been explained to the patient, who verbalizes understanding       ------------------------------------------------------------    Subjective: Gerardo Romero has been cooperative on the unit and compliant with medications per nursing report  Today, Gerardo Romero is consenting for safety on the unit  she reports she has not seen any hallucinations since Monday, when she saw the top half a person standing behind an actual person  She has been very fearful of her hallucinations in the past because the content is people coming into her home with intent to set her house on fire or harm her in another way    When she 1st started having hallucinations, she did not initially whether they were real or not until her children showed her and reassured her they are "illusions " Though she used to stay up all night watching the door so that no one could come into her home, she states she fell asleep more easily and slept better last night because she was less afraid of recurrence of the hallucinations  Progress Toward Goals: slow improvement    Psychiatric Review of Systems:  Behavior over the last 24 hours: improved  Sleep: improved  Appetite: adequate  Medication side effects: none  ROS: Complete review of systems is negative except as noted above      Vital signs in last 24 hours:  Temp:  [97 9 °F (36 6 °C)-98 2 °F (36 8 °C)] 98 2 °F (36 8 °C)  HR:  [71-80] 71  Resp:  [16] 16  BP: (116-146)/(57-67) 146/67    Mental Status Evaluation:  Appearance:  alert, good eye contact, appears stated age, appropriate grooming and hygiene and overweight   Behavior:  sitting comfortably, no abnormal movements and Stable gait with rolling walker   Attitude:  pleasant and cooperative   Speech:  spontaneous, clear, normal rate, normal volume and coherent   Mood:  anxious   Affect:  mood-congruent and brighter than previous days   Thought Process:  organized, logical, coherent, linear, goal directed, normal rate of thoughts   Thought Content: mild paranoia, ruminating thoughts   Perceptual disturbances: no reported auditory hallucinations, visual hallucinations as in HPI and does not appear to be responding to internal stimuli at this time   Risk Potential: No active or passive suicidal or homicidal ideation, Low potential for aggression based on previous behavior   Cognition: oriented to person, place, time, and situation, appears to be of average intelligence, age-appropriate attention span and concentration and SLUMS score 22 with major deficits in short-term recall   Insight:  Limited   Judgment: Limited     Current Medications:  Current Facility-Administered Medications   Medication Dose Route Frequency Provider Last Rate    acetaminophen  650 mg Oral Q6H PRN Mouna Lilly MD      aluminum-magnesium hydroxide-simethicone  30 mL Oral Q4H PRN Mouna Lilly MD      ARIPiprazole  5 mg Oral Daily Mouna Lilly MD      aspirin  81 mg Oral Daily Mouna Lilly MD  famotidine  20 mg Oral Daily Minh Meléndez MD      folic acid  1 mg Oral Daily Minh Meléndez MD      furosemide  40 mg Oral Daily Minh Meléndez MD      glimepiride  2 mg Oral Daily With Breakfast Minh Meléndez MD      haloperidol  2 5 mg Oral Q6H PRN Minh Meléndez MD      haloperidol lactate  2 5 mg Intramuscular Q1H PRN Minh Meléndez MD      levothyroxine  100 mcg Oral Early Morning Minh Meléndez MD      metoprolol tartrate  25 mg Oral Q12H [address] Minh Meléndez MD      polyethylene glycol  17 g Oral Daily PRN Minh Meléndez MD      potassium chloride  20 mEq Oral Daily Minh Meléndez MD      sertraline  25 mg Oral Daily Minh Meléndez MD      warfarin  2 mg Oral Daily (warfarin) Bang Degroot MD         Behavioral Health Medications: all current active meds have been reviewed  Changes as in plan section above  Laboratory results:  I have personally reviewed all pertinent laboratory/tests results    Recent Results (from the past 48 hour(s))   Rapid drug screen, urine    Collection Time: 08/31/20  1:19 PM   Result Value Ref Range    Amph/Meth UR Negative Negative    Barbiturate Ur Negative Negative    Benzodiazepine Urine Negative Negative    Cocaine Urine Negative Negative    Methadone Urine Negative Negative    Opiate Urine Negative Negative    PCP Ur Negative Negative    THC Urine Negative Negative    Oxycodone Urine Negative Negative   Protime-INR    Collection Time: 09/01/20  5:30 AM   Result Value Ref Range    Protime 32 0 (H) 11 6 - 14 5 seconds    INR 3 11 (H) 0 84 - 1 19   UA w Reflex to Microscopic w Reflex to Culture    Collection Time: 09/01/20  1:10 PM    Specimen: Urine, Clean Catch   Result Value Ref Range    Color, UA Straw Straw, Yellow, Pale Yellow    Clarity, UA Slightly Cloudy (A) Clear, Other    Specific [place], UA 1 010 1 003 - 1 040    pH, UA 6 0 4 5, 5 0, 5 5, 6 0, 6 5, 7 0, 7 5, 8 0    Leukocytes,  0 (A) Negative    Nitrite, UA Negative Negative    Protein, UA Negative Negative mg/dl    Glucose, UA Negative Negative mg/dl    Ketones, UA Negative Negative mg/dl    Bilirubin, UA Negative Negative    Blood, UA Negative Negative    UROBILINOGEN UA Negative 1 0, Negative mg/dL   Urine Microscopic    Collection Time: 09/01/20  1:10 PM   Result Value Ref Range    RBC, UA None Seen None Seen, 0-5 /hpf    WBC, UA 4-10 (A) None Seen, 0-5, 5-55, 5-65 /hpf    Epithelial Cells Occasional None Seen, Occasional /hpf    Bacteria, UA Innumerable (A) None Seen, Occasional /hpf   Protime-INR    Collection Time: 09/02/20  6:36 AM   Result Value Ref Range    Protime 29 6 (H) 11 6 - 14 5 seconds    INR 2 81 (H) 0 84 - 1 19        This note has been constructed using a voice recognition system  There may be translation, syntax, or grammatical errors  If you have any questions, please contact the dictating provider

## 2020-09-02 NOTE — CASE MANAGEMENT
Spoke with pt's daughter, Nitesh Card, to obtain collateral info  Nitesh Card reports that pt's VH has been non stop since May -- pt believes that people are trying to hurt her and start fires in her apt  She believes her phone is tapped and that someone is always hiding under her bed or standing behind her  Pt will get agitated with Nitesh Card when she asks if people are there and Nitesh Card says they're not  Nitesh Card is wondering what the underlying cause is  She's wondering if pt might have Lyme's Disease and/or dementia  CM will follow up with Nitesh Card once SLUMS is completed  Nitesh Card knows that pt wants to return to [place] in [place] apt  Nitesh Card feels comfortable with this as long as the VH is under control and decreased  Nitesh Card is not in a position where she can bring pt home with her due to having 8 people living in her home already  Aging referral will be made on pt's behalf if pt returns home after d/c

## 2020-09-02 NOTE — NURSING NOTE
Patient cooperative and calm  Taking medications  Worked with staff for EEG  INR 2 81 and coumadin given  Anxiety 2/4 and depression 4/10  Eating meals in dinning room   Will monitor

## 2020-09-03 PROBLEM — N18.30 CKD (CHRONIC KIDNEY DISEASE) STAGE 3, GFR 30-59 ML/MIN (HCC): Status: ACTIVE | Noted: 2020-09-03

## 2020-09-03 PROCEDURE — 95816 EEG AWAKE AND DROWSY: CPT | Performed by: PSYCHIATRY & NEUROLOGY

## 2020-09-03 PROCEDURE — NC001 PR NO CHARGE: Performed by: PSYCHIATRY & NEUROLOGY

## 2020-09-03 PROCEDURE — 99232 SBSQ HOSP IP/OBS MODERATE 35: CPT | Performed by: PSYCHIATRY & NEUROLOGY

## 2020-09-03 RX ORDER — ARIPIPRAZOLE 10 MG/1
10 TABLET ORAL DAILY
Status: DISCONTINUED | OUTPATIENT
Start: 2020-09-04 | End: 2020-09-10 | Stop reason: HOSPADM

## 2020-09-03 RX ORDER — LANOLIN ALCOHOL/MO/W.PET/CERES
6 CREAM (GRAM) TOPICAL
Status: DISCONTINUED | OUTPATIENT
Start: 2020-09-04 | End: 2020-09-10 | Stop reason: HOSPADM

## 2020-09-03 RX ORDER — LANOLIN ALCOHOL/MO/W.PET/CERES
3 CREAM (GRAM) TOPICAL
Status: COMPLETED | OUTPATIENT
Start: 2020-09-03 | End: 2020-09-03

## 2020-09-03 RX ADMIN — MELATONIN TAB 3 MG 3 MG: 3 TAB at 21:08

## 2020-09-03 RX ADMIN — METOPROLOL TARTRATE 25 MG: 25 TABLET, FILM COATED ORAL at 21:08

## 2020-09-03 RX ADMIN — WARFARIN SODIUM 2 MG: 2 TABLET ORAL at 17:05

## 2020-09-03 RX ADMIN — ALUMINUM HYDROXIDE, MAGNESIUM HYDROXIDE, AND SIMETHICONE 30 ML: 200; 200; 20 SUSPENSION ORAL at 11:20

## 2020-09-03 RX ADMIN — ASPIRIN 81 MG: 81 TABLET, COATED ORAL at 08:59

## 2020-09-03 RX ADMIN — FAMOTIDINE 20 MG: 20 TABLET ORAL at 08:58

## 2020-09-03 RX ADMIN — ARIPIPRAZOLE 5 MG: 5 TABLET ORAL at 08:59

## 2020-09-03 RX ADMIN — METOPROLOL TARTRATE 25 MG: 25 TABLET, FILM COATED ORAL at 09:00

## 2020-09-03 RX ADMIN — POTASSIUM CHLORIDE 20 MEQ: 20 TABLET, EXTENDED RELEASE ORAL at 08:59

## 2020-09-03 RX ADMIN — GLIMEPIRIDE 2 MG: 2 TABLET ORAL at 08:59

## 2020-09-03 RX ADMIN — ACETAMINOPHEN 650 MG: 325 TABLET ORAL at 00:45

## 2020-09-03 RX ADMIN — FOLIC ACID 1 MG: 1 TABLET ORAL at 08:59

## 2020-09-03 RX ADMIN — LEVOTHYROXINE SODIUM 100 MCG: 100 TABLET ORAL at 06:29

## 2020-09-03 RX ADMIN — SERTRALINE HYDROCHLORIDE 25 MG: 25 TABLET ORAL at 08:59

## 2020-09-03 NOTE — QUICK NOTE
Progress Note - Behavioral Health   Sophia Albarran 82 y o  female MRN: [id number]  Unit/Bed#: Louisa Grimaldo 364-38 Encounter: [id number]    Assessment/Plan   Principal Problem:    Psychotic disorder ([place] [address] )  Active Problems:    Diabetes mellitus type 2, noninsulin dependent ([place] [address] )    Medical clearance for psychiatric admission    Acquired hypothyroidism    Coronary artery disease involving native coronary artery of native heart without angina pectoris    Paroxysmal atrial fibrillation (HCC)    Ischemic cardiomyopathy    Major neurocognitive disorder ([place] [address] )      Behavior over the last 24 hours:  regressed  Sleep: insomnia due to troublesome roomate  Appetite: normal  Medication side effects: No  ROS: Pain in right leg - appears musculoskeletal    Progress Toward Goals: Michelle Massey had a return of visual hallucinations last night starting around 2200  She endorses difficulty sleeping due in part to her anxiety about the hallucination and in part due to the repeated antagonism of her roommate, who wanted her to call the nurses on multiple occasions  Recommended Treatment: Continue with group therapy, milieu therapy and occupational therapy  Risks, benefits and possible side effects of Medications:   Risks, benefits, and possible side effects of medications explained to patient and patient verbalizes understanding          Mental Status Evaluation:  Appearance:  age appropriate and casually dressed   Behavior:  normal   Speech:  normal pitch and normal volume   Mood:  anxious   Affect:  normal   Thought Process:  normal   Thought Content:  normal   Perceptual Disturbances: Visual hallucinations   Risk Potential: Suicidal Ideations none  Homicidal Ideations none  Potential for Aggression No   Sensorium:  person, place and time/date   Memory:  recent and remote memory grossly intact   Consciousness:  awake    Attention: attention span and concentration were age appropriate   Insight:  fair   Judgment: fair   Gait/Station: normal gait/station and slow   Motor Activity: no abnormal movements       Medications: all current active meds have been reviewed and continue current psychiatric medications  Labs: I have personally reviewed all pertinent laboratory/tests results  No results found for this or any previous visit (from the past 24 hour(s))  Counseling / Coordination of Care  Total floor / unit time spent today 20 minutes  Greater than 50% of total time was spent with the patient and / or family counseling and / or coordination of care  A description of the counseling / coordination of care: symptoms and treatment

## 2020-09-03 NOTE — QUICK NOTE
Progress Note - Behavioral Health   Mary Raza 82 y o  female MRN: [id number]  Unit/Bed#: Rollen Hodgkin 711-34 Encounter: [id number]    Assessment/Plan   Principal Problem:    Psychotic disorder ([facility] )  Active Problems:    Diabetes mellitus type 2, noninsulin dependent ([facility] )    Medical clearance for psychiatric admission    Acquired hypothyroidism    Coronary artery disease involving native coronary artery of native heart without angina pectoris    Paroxysmal atrial fibrillation (HCC)    Ischemic cardiomyopathy    Major neurocognitive disorder (HCC)    CKD (chronic kidney disease) stage 3, GFR 30-59 ml/min ([facility])      Behavior over the last 24 hours:  regressed  Sleep: interrupted  Appetite: normal  Medication side effects: No  ROS: Pain in right leg    Mental Status Evaluation:  Appearance:  age appropriate and casually dressed   Behavior:  normal   Speech:  normal pitch and normal volume   Mood:  normal   Affect:  normal   Thought Process:  normal   Thought Content:  normal   Perceptual Disturbances: Visual hallucinations   Risk Potential: Suicidal Ideations none  Homicidal Ideations none  Potential for Aggression No   Sensorium:  person, place and time/date   Memory:  recent and remote memory grossly intact   Consciousness:  awake    Attention: attention span and concentration were age appropriate   Insight:  fair   Judgment: fair   Gait/Station: normal gait/station and slow   Motor Activity: no abnormal movements     Progress Toward Goals: Casandra's visual hallucinations returned last night ~2200h  She endorses fear and anxiety caused by the hallucinations  She also had issues sleepin due to the her roommate repeatedly waking her for assistance in calling the nurses despite being instructed how to call the nurses herself  Pt continues to have pain in her right leg without redness, swelling, or heat    She describes the pain as occurring when she is rising from a seated position or when she assumes certain positions in bed  It is most likely musculoskeletal in nature  She reports no negative side effects from her medication  Will increase her Abilify to 10mg/day    Recommended Treatment: Continue with group therapy, milieu therapy and occupational therapy  Risks, benefits and possible side effects of Medications:   Risks, benefits, and possible side effects of medications explained to patient and patient verbalizes understanding  Medications:   current meds:   Current Facility-Administered Medications   Medication Dose Route Frequency    acetaminophen (TYLENOL) tablet 650 mg  650 mg Oral Q6H PRN    aluminum-magnesium hydroxide-simethicone (MYLANTA) 200-200-20 mg/5 mL oral suspension 30 mL  30 mL Oral Q4H PRN    [START ON 9/4/2020] ARIPiprazole (ABILIFY) tablet 10 mg  10 mg Oral Daily    aspirin (ECOTRIN LOW STRENGTH) EC tablet 81 mg  81 mg Oral Daily    famotidine (PEPCID) tablet 20 mg  20 mg Oral Daily    folic acid (FOLVITE) tablet 1 mg  1 mg Oral Daily    furosemide (LASIX) tablet 40 mg  40 mg Oral Daily    glimepiride (AMARYL) tablet 2 mg  2 mg Oral Daily With Breakfast    haloperidol (HALDOL) tablet 2 5 mg  2 5 mg Oral Q6H PRN    haloperidol lactate (HALDOL) injection 2 5 mg  2 5 mg Intramuscular Q1H PRN    levothyroxine tablet 100 mcg  100 mcg Oral Early Morning    melatonin tablet 3 mg  3 mg Oral HS    [START ON 9/4/2020] melatonin tablet 6 mg  6 mg Oral HS    metoprolol tartrate (LOPRESSOR) tablet 25 mg  25 mg Oral Q12H ZACK    polyethylene glycol (MIRALAX) packet 17 g  17 g Oral Daily PRN    potassium chloride (K-DUR,KLOR-CON) CR tablet 20 mEq  20 mEq Oral Daily    sertraline (ZOLOFT) tablet 25 mg  25 mg Oral Daily    warfarin (COUMADIN) tablet 2 mg  2 mg Oral Daily (warfarin)     Labs: I have personally reviewed all pertinent laboratory/tests results  No results found for this or any previous visit (from the past 24 hour(s))      Counseling / Coordination of Care  Total floor / unit time spent today 20 minutes  Greater than 50% of total time was spent with the patient and / or family counseling and / or coordination of care  A description of the counseling / coordination of care: examination and review of symptoms

## 2020-09-03 NOTE — NURSING NOTE
Pt reported feeling better  Denies SI, reports depression 4/10 and anxiety 2/4  Denies any hallucinations for 2 days  Medication and care compliant  Had Tylenol for HA which was effective for symptom  Pt was visible in milieu and social with select peers  Pt brightened on approach

## 2020-09-03 NOTE — PLAN OF CARE
Problem: Alteration in Thoughts and Perception  Goal: Treatment Goal: Gain control of psychotic behaviors/thinking, reduce/eliminate presenting symptoms and demonstrate improved reality functioning upon discharge  Outcome: Progressing  Goal: Verbalize thoughts and feelings  Description: Interventions:  - Promote a nonjudgmental and trusting relationship with the patient through active listening and therapeutic communication  - Assess patient's level of functioning, behavior and potential for risk  - Engage patient in 1 on 1 interactions  - Encourage patient to express fears, feelings, frustrations, and discuss symptoms    - [place] patient to reality, help patient recognize reality-based thinking   - Administer medications as ordered and assess for potential side effects  - Provide the patient education related to the signs and symptoms of the illness and desired effects of prescribed medications  Outcome: Progressing  Goal: Refrain from acting on delusional thinking/internal stimuli  Description: Interventions:  - Monitor patient closely, per order   - Utilize least restrictive measures   - Set reasonable limits, give positive feedback for acceptable   - Administer medications as ordered and monitor of potential side effects  Outcome: Progressing  Goal: Agree to be compliant with medication regime, as prescribed and report medication side effects  Description: Interventions:  - Offer appropriate PRN medication and supervise ingestion; conduct AIMS, as needed   Outcome: Progressing  Goal: Attend and participate in unit activities, including therapeutic, recreational, and educational groups  Description: Interventions:  -Encourage Visitation and family involvement in care  Outcome: Progressing  Goal: Recognize dysfunctional thoughts, communicate reality-based thoughts at the time of discharge  Description: Interventions:  - Provide medication and psycho-education to assist patient in compliance and developing insight into his/her illness   Outcome: Progressing  Goal: Complete daily ADLs, including personal hygiene independently, as able  Description: Interventions:  - Observe, teach, and assist patient with ADLS  - Monitor and promote a balance of rest/activity, with adequate nutrition and elimination   Outcome: Progressing     Problem: Ineffective Coping  Goal: Cooperates with admission process  Description: Interventions:   - Complete admission process  Outcome: Progressing  Goal: Identifies ineffective coping skills  Outcome: Progressing  Goal: Identifies healthy coping skills  Outcome: Progressing  Goal: Demonstrates healthy coping skills  Outcome: Progressing  Goal: Participates in unit activities  Description: Interventions:  - Provide therapeutic environment   - Provide required programming   - Redirect inappropriate behaviors   Outcome: Progressing  Goal: Patient/Family participate in treatment and DC plans  Description: Interventions:  - Provide therapeutic environment  Outcome: Progressing  Goal: Patient/Family verbalizes awareness of resources  Outcome: Progressing  Goal: Understands least restrictive measures  Description: Interventions:  - Utilize least restrictive behavior  Outcome: Progressing  Goal: Free from restraint events  Description: - Utilize least restrictive measures   - Provide behavioral interventions   - Redirect inappropriate behaviors   Outcome: Progressing     Problem: Depression  Goal: Treatment Goal: Demonstrate behavioral control of depressive symptoms, verbalize feelings of improved mood/affect, and adopt new coping skills prior to discharge  Outcome: Progressing  Goal: Verbalize thoughts and feelings  Description: Interventions:  - Assess and re-assess patient's level of risk   - Engage patient in 1:1 interactions, daily, for a minimum of 15 minutes   - Encourage patient to express feelings, fears, frustrations, hopes   Outcome: Progressing  Goal: Refrain from harming self  Description: Interventions:  - Monitor patient closely, per order   - Supervise medication ingestion, monitor effects and side effects   Outcome: Progressing  Goal: Refrain from isolation  Description: Interventions:  - Develop a trusting relationship   - Encourage socialization   Outcome: Progressing  Goal: Refrain from self-neglect  Outcome: Progressing  Goal: Attend and participate in unit activities, including therapeutic, recreational, and educational groups  Description: Interventions:  - Provide therapeutic and educational activities daily, encourage attendance and participation, and document same in the medical record   Outcome: Progressing  Goal: Complete daily ADLs, including personal hygiene independently, as able  Description: Interventions:  - Observe, teach, and assist patient with ADLS  -  Monitor and promote a balance of rest/activity, with adequate nutrition and elimination   Outcome: Progressing     Problem: Anxiety  Goal: Anxiety is at manageable level  Description: Interventions:  - Assess and monitor patient's anxiety level  - Monitor for signs and symptoms (heart palpitations, chest pain, shortness of breath, headaches, nausea, feeling jumpy, restlessness, irritable, apprehensive)  - Collaborate with interdisciplinary team and initiate plan and interventions as ordered  - [place] patient to unit/surroundings  - Explain treatment plan  - Encourage participation in care  - Encourage verbalization of concerns/fears  - Identify coping mechanisms  - Assist in developing anxiety-reducing skills  - Administer/offer alternative therapies  - Limit or eliminate stimulants  Outcome: Progressing     Problem: Potential for Falls  Goal: Patient will remain free of falls  Description: INTERVENTIONS:  - Assess patient frequently for physical needs  -  Identify cognitive and physical deficits and behaviors that affect risk of falls    -  [place] fall precautions as indicated by assessment   - Educate patient/family on patient safety including physical limitations  - Instruct patient to call for assistance with activity based on assessment  - Modify environment to reduce risk of injury  - Consider OT/PT consult to assist with strengthening/mobility  Outcome: Progressing     Problem: DISCHARGE PLANNING  Goal: Discharge to home or other facility with appropriate resources  Description: INTERVENTIONS:  - Identify barriers to discharge w/patient and caregiver  - Arrange for needed discharge resources and transportation as appropriate  - Identify discharge learning needs (meds, wound care, etc )  - Arrange for interpretive services to assist at discharge as needed  - Refer to Case Management Department for coordinating discharge planning if the patient needs post-hospital services based on physician/advanced practitioner order or complex needs related to functional status, cognitive ability, or social support system  Outcome: Progressing

## 2020-09-03 NOTE — CASE MANAGEMENT
Spoke with pt's daughter, Essence Carrera, to give update  Essence Carrera reports that pt called her granddaughter this morning and reported that she saw 5 men in her room last night and they were coming to get her  She also reported to her granddaughter that she's still seeing people [facility]) sitting behind staff and listening in on conversations  She also believes that the phones here are tapped  Essence Carrera reports that pt has not called her in a couple of days, which is strange since they usually talk to each other multiple times a day  Pt's rent is due on Saturday and Essence Carrera offered to pay it since pt is hospitalized but pt is adamant about using her own check to pay  Essence Carrera is able to come to hospital and  pt's check if pt is agreeable

## 2020-09-03 NOTE — NURSING NOTE
Pt is medication complaint, calm and cooperative with  Pt visible on unit and out for meals,ambulates with a rolling walker   Good intake at dinner time and VSS  Pt denies s/s at time and did not report anxiety or depression   Will continue to monitor

## 2020-09-03 NOTE — PROGRESS NOTES
Progress Note - Behavioral Health   Demetrius Ocampo 82 y o  female MRN: [id number]  Unit/Bed#: Sara Gary 076-16 Encounter: [id number]    Assessment/Plan   Principal Problem:    Psychotic disorder ([place] [address] )  Active Problems:    Diabetes mellitus type 2, noninsulin dependent ([place] [address] )    Medical clearance for psychiatric admission    Acquired hypothyroidism    Coronary artery disease involving native coronary artery of native heart without angina pectoris    Paroxysmal atrial fibrillation (HCC)    Ischemic cardiomyopathy    Major neurocognitive disorder ([place] [address] )      Recommended Treatment:   Increase Abilify to 10 mg daily for continued visual hallucinations  Start melatonin 3 mg for complex nocturnal hallucinations, increased tomorrow to 6 mg  Occupational therapy cognitive evaluation in anticipation of discharge home to independent living; patient does require additional inpatient hospitalization at this time due to threatening visual hallucinations which prevent her from caring for herself at home    Continue with group therapy, milieu therapy and occupational therapy  Continue frequent safety checks and vitals per unit protocol  Case discussed with treatment team   Risks, benefits and possible side effects of Medications: Risks, benefits, and possible side effects of medications have been explained to the patient, who verbalizes understanding       ------------------------------------------------------------    Subjective: Per nursing report, Gerardo Romero had difficulty sleeping overnight due to her roommate asking her to call for the nurses repeatedly  Today, Gerardo Romero was cooperative in interview, reporting return of her visual hallucinations last night  She stated that between 10:00 p m  And 3a  m  Last night, she saw at least 6 people inside and outside of her room, as well as flashing car lights  She took this to mean that people were trying to get her to come outside and were going to try to kill her    She states she is quite fearful of these people, even though she knows they are just hallucinations  She reports these hallucinations occur primarily at night and prevent her from falling asleep, as she feels she needs to stay vigilant against their attempts to harm her, but she did eventually fall asleep at 3:00 a m  Last night  She states the people she hallucinates sometimes do communicate with her, though she denies auditory hallucinations  She states she wants to continue living independently following discharge but does not feel safe for discharge at this time due to her continued threatening visual hallucinations which prevented her from caring for herself prior to admission  Progress Toward Goals:  Regressed    Psychiatric Review of Systems:  Behavior over the last 24 hours: unchanged  Sleep: insomnia and difficulty falling asleep  Appetite: adequate  Medication side effects: none  ROS: Complete review of systems is negative except as noted above      Vital signs in last 24 hours:  Temp:  [97 5 °F (36 4 °C)-98 8 °F (37 1 °C)] 98 8 °F (37 1 °C)  HR:  [73-87] 73  Resp:  [16-18] 18  BP: (133-139)/(60-67) 139/67    Mental Status Evaluation:  Appearance:  alert, good eye contact, appears stated age, appropriate grooming and hygiene and obese   Behavior:  sitting comfortably, no abnormal movements and Stable on gait with use of rolling walker   Attitude:  cooperative   Speech:  spontaneous, clear, normal rate, soft and coherent   Mood:  anxious   Affect:  mood-congruent and constricted   Thought Process:  organized, coherent, perseverative, normal rate of thoughts   Thought Content: Persecutory delusions, paranoid ideation, intrusive thoughts, ruminating thoughts   Perceptual disturbances: auditory hallucinations   and visual hallucinations Of multiple people attempting to harm her   Risk Potential: No active suicidal ideation, No active homicidal ideation, Potential for aggression due to acute psychosis   Cognition: oriented to person, place, time, and situation, appears to be of average intelligence, age-appropriate attention span and concentration and cognition not formally tested   Insight:  Fair   Judgment: Limited     Current Medications:  Current Facility-Administered Medications   Medication Dose Route Frequency Provider Last Rate    acetaminophen  650 mg Oral Q6H PRN Mina Layer, MD      aluminum-magnesium hydroxide-simethicone  30 mL Oral Q4H PRN Mina Layer, MD [place] 9/4/2020] ARIPiprazole  10 mg Oral Daily Haresh Dubois MD      aspirin  81 mg Oral Daily Mina Layer, MD      famotidine  20 mg Oral Daily Mina Layer, MD      folic acid  1 mg Oral Daily Mina Layer, MD      furosemide  40 mg Oral Daily Mina Layer, MD      glimepiride  2 mg Oral Daily With Breakfast Mina Layer, MD      haloperidol  2 5 mg Oral Q6H PRN Mina Layer, MD      haloperidol lactate  2 5 mg Intramuscular Q1H PRN Mina Layer, MD      levothyroxine  100 mcg Oral Early Morning Mina Layer, MD      melatonin  3 mg Oral HS Haresh Dubois MD      [START ON 9/4/2020] melatonin  6 mg Oral HS Haresh Dubois MD      metoprolol tartrate  25 mg Oral Q12H [address] Mina Sybil, MD      polyethylene glycol  17 g Oral Daily PRN Mina Layer, MD      potassium chloride  20 mEq Oral Daily Mina Layer, MD      sertraline  25 mg Oral Daily Mina Layer, MD      warfarin  2 mg Oral Daily (warfarin) Chhaya Munoz MD         Behavioral Health Medications: all current active meds have been reviewed  Changes as in plan section above  Laboratory results:  I have personally reviewed all pertinent laboratory/tests results    Recent Results (from the past 50 hour(s))   UA w Reflex to Microscopic w Reflex to Culture    Collection Time: 09/01/20  1:10 PM    Specimen: Urine, Clean Catch   Result Value Ref Range    Color, UA Straw Straw, Yellow, Pale Yellow    Clarity, UA Slightly Cloudy (A) Clear, Other    Specific [place], UA 1 010 1 003 - 1 040 pH, UA 6 0 4 5, 5 0, 5 5, 6 0, 6 5, 7 0, 7 5, 8 0    Leukocytes,  0 (A) Negative    Nitrite, UA Negative Negative    Protein, UA Negative Negative mg/dl    Glucose, UA Negative Negative mg/dl    Ketones, UA Negative Negative mg/dl    Bilirubin, UA Negative Negative    Blood, UA Negative Negative    UROBILINOGEN UA Negative 1 0, Negative mg/dL   Urine Microscopic    Collection Time: 09/01/20  1:10 PM   Result Value Ref Range    RBC, UA None Seen None Seen, 0-5 /hpf    WBC, UA 4-10 (A) None Seen, 0-5, 5-55, 5-65 /hpf    Epithelial Cells Occasional None Seen, Occasional /hpf    Bacteria, UA Innumerable (A) None Seen, Occasional /hpf   Protime-INR    Collection Time: 09/02/20  6:36 AM   Result Value Ref Range    Protime 29 6 (H) 11 6 - 14 5 seconds    INR 2 81 (H) 0 84 - 1 19        This note has been constructed using a voice recognition system  There may be translation, syntax, or grammatical errors  If you have any questions, please contact the dictating provider

## 2020-09-03 NOTE — PLAN OF CARE
Problem: Alteration in Thoughts and Perception  Goal: Treatment Goal: Gain control of psychotic behaviors/thinking, reduce/eliminate presenting symptoms and demonstrate improved reality functioning upon discharge  Outcome: Progressing  Goal: Verbalize thoughts and feelings  Description: Interventions:  - Promote a nonjudgmental and trusting relationship with the patient through active listening and therapeutic communication  - Assess patient's level of functioning, behavior and potential for risk  - Engage patient in 1 on 1 interactions  - Encourage patient to express fears, feelings, frustrations, and discuss symptoms    - [place] patient to reality, help patient recognize reality-based thinking   - Administer medications as ordered and assess for potential side effects  - Provide the patient education related to the signs and symptoms of the illness and desired effects of prescribed medications  Outcome: Progressing  Goal: Refrain from acting on delusional thinking/internal stimuli  Description: Interventions:  - Monitor patient closely, per order   - Utilize least restrictive measures   - Set reasonable limits, give positive feedback for acceptable   - Administer medications as ordered and monitor of potential side effects  Outcome: Progressing  Goal: Agree to be compliant with medication regime, as prescribed and report medication side effects  Description: Interventions:  - Offer appropriate PRN medication and supervise ingestion; conduct AIMS, as needed   Outcome: Progressing  Goal: Attend and participate in unit activities, including therapeutic, recreational, and educational groups  Description: Interventions:  -Encourage Visitation and family involvement in care  Outcome: Progressing  Goal: Recognize dysfunctional thoughts, communicate reality-based thoughts at the time of discharge  Description: Interventions:  - Provide medication and psycho-education to assist patient in compliance and developing insight into his/her illness   Outcome: Progressing  Goal: Complete daily ADLs, including personal hygiene independently, as able  Description: Interventions:  - Observe, teach, and assist patient with ADLS  - Monitor and promote a balance of rest/activity, with adequate nutrition and elimination   Outcome: Progressing     Problem: Ineffective Coping  Goal: Cooperates with admission process  Description: Interventions:   - Complete admission process  Outcome: Progressing  Goal: Identifies ineffective coping skills  Outcome: Progressing  Goal: Identifies healthy coping skills  Outcome: Progressing  Goal: Demonstrates healthy coping skills  Outcome: Progressing  Goal: Participates in unit activities  Description: Interventions:  - Provide therapeutic environment   - Provide required programming   - Redirect inappropriate behaviors   Outcome: Progressing  Goal: Patient/Family participate in treatment and DC plans  Description: Interventions:  - Provide therapeutic environment  Outcome: Progressing  Goal: Patient/Family verbalizes awareness of resources  Outcome: Progressing  Goal: Understands least restrictive measures  Description: Interventions:  - Utilize least restrictive behavior  Outcome: Progressing  Goal: Free from restraint events  Description: - Utilize least restrictive measures   - Provide behavioral interventions   - Redirect inappropriate behaviors   Outcome: Progressing     Problem: Depression  Goal: Treatment Goal: Demonstrate behavioral control of depressive symptoms, verbalize feelings of improved mood/affect, and adopt new coping skills prior to discharge  Outcome: Progressing  Goal: Verbalize thoughts and feelings  Description: Interventions:  - Assess and re-assess patient's level of risk   - Engage patient in 1:1 interactions, daily, for a minimum of 15 minutes   - Encourage patient to express feelings, fears, frustrations, hopes   Outcome: Progressing  Goal: Refrain from harming self  Description: Interventions:  - Monitor patient closely, per order   - Supervise medication ingestion, monitor effects and side effects   Outcome: Progressing  Goal: Refrain from isolation  Description: Interventions:  - Develop a trusting relationship   - Encourage socialization   Outcome: Progressing  Goal: Refrain from self-neglect  Outcome: Progressing  Goal: Attend and participate in unit activities, including therapeutic, recreational, and educational groups  Description: Interventions:  - Provide therapeutic and educational activities daily, encourage attendance and participation, and document same in the medical record   Outcome: Progressing  Goal: Complete daily ADLs, including personal hygiene independently, as able  Description: Interventions:  - Observe, teach, and assist patient with ADLS  -  Monitor and promote a balance of rest/activity, with adequate nutrition and elimination   Outcome: Progressing     Problem: Anxiety  Goal: Anxiety is at manageable level  Description: Interventions:  - Assess and monitor patient's anxiety level  - Monitor for signs and symptoms (heart palpitations, chest pain, shortness of breath, headaches, nausea, feeling jumpy, restlessness, irritable, apprehensive)  - Collaborate with interdisciplinary team and initiate plan and interventions as ordered  - [place] patient to unit/surroundings  - Explain treatment plan  - Encourage participation in care  - Encourage verbalization of concerns/fears  - Identify coping mechanisms  - Assist in developing anxiety-reducing skills  - Administer/offer alternative therapies  - Limit or eliminate stimulants  Outcome: Progressing     Problem: Potential for Falls  Goal: Patient will remain free of falls  Description: INTERVENTIONS:  - Assess patient frequently for physical needs  -  Identify cognitive and physical deficits and behaviors that affect risk of falls    -  [place] fall precautions as indicated by assessment   - Educate patient/family on patient safety including physical limitations  - Instruct patient to call for assistance with activity based on assessment  - Modify environment to reduce risk of injury  - Consider OT/PT consult to assist with strengthening/mobility  Outcome: Progressing     Problem: DISCHARGE PLANNING  Goal: Discharge to home or other facility with appropriate resources  Description: INTERVENTIONS:  - Identify barriers to discharge w/patient and caregiver  - Arrange for needed discharge resources and transportation as appropriate  - Identify discharge learning needs (meds, wound care, etc )  - Arrange for interpretive services to assist at discharge as needed  - Refer to Case Management Department for coordinating discharge planning if the patient needs post-hospital services based on physician/advanced practitioner order or complex needs related to functional status, cognitive ability, or social support system  Outcome: Progressing

## 2020-09-03 NOTE — CASE MANAGEMENT
Pt reports that her son, Neelima Jack, is going to pay her rent for her tomorrow  Additionally, pt reports that she's tried calling her daughter, Mihai Ledbetter, a few times but she's been getting the answering machine  Seems like pt they have been playing phone tag -- pt does not report any other reasons for not speaking with daughter over the past few days   CM will update pt's daughter

## 2020-09-03 NOTE — PROGRESS NOTES
09/03/20 1000 09/03/20 1290   Activity/Group Checklist   Group Admission/Discharge  (relapse prevention planning  plan completed) Life Skills  (knocking out stress topic)   Attendance Attended Attended   Attendance Duration (min) 31-45 46-60   Interactions Interacted appropriately  (ID" seeing people no one else sees"as a sign of illness ) Interacted appropriately   Affect/Mood Appropriate;Normal range Appropriate;Normal range   Goals Achieved Able to engage in interactions Able to engage in interactions; Identified feelings; Discussed coping strategies

## 2020-09-03 NOTE — PLAN OF CARE
Pt  Attended 2/3 groups today and was able to talk about the stress of seeing people that others cant see    Problem: Ineffective Coping  Goal: Participates in unit activities  Description: Interventions:  - Provide therapeutic environment   - Provide required programming   - Redirect inappropriate behaviors   Outcome: Progressing

## 2020-09-03 NOTE — NURSING NOTE
Patient is visible in hallway and dayroom  Denies any suicidal or homicidal ideations  Anxiety is 2/4  Depression is 4/10  Denies any hallucinations  Ambulates independently, appetite good, and patient is continent of urine and stool  Safety measures in place  Will continue to monitor

## 2020-09-03 NOTE — PROGRESS NOTES
Pt  Continues to express seeing her closet in hospital room moving last night and saw one man in the room sitting near her bed that others dont see  She fears harm from those sighting and states that they always have black lines running from the corners of their mouths and disappear into the walls  stated that another female friend where she lives, let her stay in the [facility] apartment to get sleep and away from the people  Pt  hopes that medication can make the people go away  09/03/20 0915   Activity/Group Checklist   Group Admission/Discharge   Attendance Attended   Attendance Duration (min) 16-30  (1-1)   Interactions Other (Comment)  (cooperative and spoke of people she sees that others dont )   Affect/Mood Other (Comment)  (expresses fear that she will be harmed by "the intruders")   Goals Achieved Identified feelings; Discussed coping strategies; Able to self-disclose; Able to engage in interactions

## 2020-09-04 PROBLEM — G31.84 MILD NEUROCOGNITIVE DISORDER: Chronic | Status: ACTIVE | Noted: 2020-09-01

## 2020-09-04 PROCEDURE — 99232 SBSQ HOSP IP/OBS MODERATE 35: CPT | Performed by: PSYCHIATRY & NEUROLOGY

## 2020-09-04 PROCEDURE — 97167 OT EVAL HIGH COMPLEX 60 MIN: CPT

## 2020-09-04 PROCEDURE — 97129 THER IVNTJ 1ST 15 MIN: CPT

## 2020-09-04 RX ADMIN — LEVOTHYROXINE SODIUM 100 MCG: 100 TABLET ORAL at 06:14

## 2020-09-04 RX ADMIN — ALUMINUM HYDROXIDE, MAGNESIUM HYDROXIDE, AND SIMETHICONE 30 ML: 200; 200; 20 SUSPENSION ORAL at 22:03

## 2020-09-04 RX ADMIN — SERTRALINE HYDROCHLORIDE 25 MG: 25 TABLET ORAL at 08:24

## 2020-09-04 RX ADMIN — ASPIRIN 81 MG: 81 TABLET, COATED ORAL at 08:24

## 2020-09-04 RX ADMIN — METOPROLOL TARTRATE 25 MG: 25 TABLET, FILM COATED ORAL at 21:41

## 2020-09-04 RX ADMIN — POTASSIUM CHLORIDE 20 MEQ: 20 TABLET, EXTENDED RELEASE ORAL at 08:23

## 2020-09-04 RX ADMIN — FOLIC ACID 1 MG: 1 TABLET ORAL at 08:23

## 2020-09-04 RX ADMIN — ARIPIPRAZOLE 10 MG: 10 TABLET ORAL at 08:26

## 2020-09-04 RX ADMIN — FAMOTIDINE 20 MG: 20 TABLET ORAL at 08:25

## 2020-09-04 RX ADMIN — WARFARIN SODIUM 2 MG: 2 TABLET ORAL at 17:13

## 2020-09-04 RX ADMIN — MELATONIN TAB 3 MG 6 MG: 3 TAB at 21:41

## 2020-09-04 RX ADMIN — GLIMEPIRIDE 2 MG: 2 TABLET ORAL at 08:23

## 2020-09-04 NOTE — NURSING NOTE
Pt is a transfer from , agisting to unit well  Pleasant and cooperative with staff  Pt denies all symptoms at this time,eating her lunch  Ambulating with walker and steady gait  Antonia WARNER was made aware that pt is on Coumadin 2 mg po daily and asked for order for next PT/INR  Will continue to monitor closely and provide support

## 2020-09-04 NOTE — DISCHARGE INSTR - APPOINTMENTS
Uriah Kuo RN, our [employer], will be calling you after your discharge, on the phone number that you provided  She will be available as an additional support, if needed  If you wish to speak with her, you may contact Carmen Cortes at [phone]  A referral was made on your behalf through [address]  You will be contacted directly to complete an intake  Phone: [phone]      Your medications have been sent to [facility] and will be bubble-packed and delivered to your apartment     Phone: [phone]

## 2020-09-04 NOTE — PROGRESS NOTES
Pt did not attend any groups when pormpted or offered         09/04/20 1330   Activity/Group Checklist   Group Other (Comment)  (positive reflection)   Attendance Did not attend

## 2020-09-04 NOTE — OCCUPATIONAL THERAPY NOTE
Occupational Therapy Evaluation     Patient Name: Dru Avina  DBTBP'B Date: 9/4/2020  Problem List  Principal Problem:    Psychotic disorder ([facility] )  Active Problems:    Diabetes mellitus type 2, noninsulin dependent ([place] [address] )    Medical clearance for psychiatric admission    Acquired hypothyroidism    Coronary artery disease involving native coronary artery of native heart without angina pectoris    Paroxysmal atrial fibrillation (HCC)    Ischemic cardiomyopathy    Mild neurocognitive disorder    CKD (chronic kidney disease) stage 3, GFR 30-59 ml/min ([facility])    Past Medical History  Past Medical History:   Diagnosis Date    Diabetes mellitus ([place] [address] )     Disease of thyroid gland     Heart attack ([place] [address] )     High cholesterol     Hypertension      Past Surgical History  No past surgical history on file  09/04/20 1110   Note Type   Note type Eval/Treat   Restrictions/Precautions   Weight Bearing Precautions Per Order No   Pain Assessment   Pain Assessment Tool Pain Assessment not indicated - pt denies pain   Pain Score No Pain   Home Living   Type of Home Apartment   Home Layout One level;Performs ADLs on one level   Bathroom Shower/Tub Tub/shower unit   Bathroom Toilet Standard   Bathroom Accessibility Accessible   Home Equipment Walker   Prior Function   Level of [place] Independent with ADLs and functional mobility; Needs assistance with IADLs   Lives With Alone   Receives Help From Home health; Family   ADL Assistance Independent   IADLs Needs assistance  (HHA A with cleaning/laundry/shopping )   Psychosocial   Psychosocial (WDL) WDL   Subjective   Subjective "I see those people but only at night"    ADL   Eating Assistance [facility] 5  [facility]  5  [address]  5  Supervision/Setup Additional Comments anticipated MI in her own environment   Bed Mobility   Supine to Sit 6  Modified independent   Sit to Supine 6  Modified independent   Transfers   Sit to Stand 6  Modified independent   Additional items Armrests; Increased time required   Stand to Sit 6  Modified independent   Additional items Armrests; Increased time required   Stand pivot 6  Modified independent   Additional items Armrests; Increased time required   Toilet transfer 6  Modified independent   Additional items Increased time required;Standard toilet   Additional Comments with rollator    Functional Mobility   Functional Mobility 6  Modified independent   Additional Comments household and community distances with no safety concerns    Additional items   (rollator )   Balance   Static Sitting Good   Dynamic Sitting Good   Static Standing Fair +   Dynamic Standing Fair   Ambulatory Fair   Activity Tolerance   Activity Tolerance Patient tolerated treatment well   Medical Staff Made Aware Spoke to CM    RUE Assessment   RUE Assessment WFL   LUE Assessment   LUE Assessment WFL   Hand Function   Gross Motor Coordination Functional   Fine Motor Coordination Functional   Sensation   Additional Comments denies numbness and tingling    Proprioception   Proprioception No apparent deficits   Cognition   Arousal/Participation Alert; Cooperative   Attention Within functional limits   Orientation Level Oriented X4   Memory Within functional limits   Following Commands Follows one step commands without difficulty   Comments appropriate behaviors during session    Cognition Assessment Tools ACLS   Score 5   Assessment   Prognosis Good   Assessment Pt is a 82 y o  female seen for OT evaluation s/p [facility] w/ Psychotic disorder ([address] )  OT completed an extensive review of pt's medical and social history  Recent administration of SLUMS score of 22/30   As per pt report, recent hallucinations, mostly evening<>nighttime hours of unfamiliar people entering her apartment with physical and verbal threats causing concerns for her well-being  When this occurs, the pt calls her daughter  Pta Ind with ADLs and mobility, HHA assist with IADLs and bathing  Upon OT eval, pt alert and oriented  Pleasant with conversation  No hallucinations during session with calm behaviors appropriate for situation  Administered Ilda Santiago Cognitive Level Screen (ACLS)  See results below  MI with all transfer and mobility using personal rollator  Based on observations, pt requires only S for safety with ADLs d/t environmental barriers or restrictions, anticipated MI in her own environment  Based on OT evaluation, assessment(s), performance deficits listed, and current level of function, pt identified as a HIGH complexity evaluation  From OT standpoint, recommendation at time of d/c would be home with social support  Goals   Patient Goals to go home safely    Additional Treatment Session   Start Time 1125   End Time 1135   Treatment Assessment Pt seen for OT Txt s/p eval with focus on functional mobility with emphasis on safety when negotiating environmental barriers  Pt demonstrates appropriate safety when using rollator (IE brakes/STS from seat)  Discussed safety precautions at home ie locking doors, emergency 911, smoke detectors, fire alarms etc  Pt answered all questions appropriately  Pt educated on fall prevention and safe discharge planning/ safety at home   D/C OT at this time  Additional Treatment Day 1   Recommendation   OT Discharge Recommendation Return to previous environment with social support   OT - OK to Discharge Yes       Administered Ilda Santiago Cognitive Level Screen (ACLS)  The patient scored 5 0/6 0 indicating that they may live alone with weekly checks to monitor safety and check problem solving effectiveness  Behavior:  Stops working to talk  Questions need to do activity    Knows need to keep scheduled appointments however may be apt for forget or miss appointments  Tangential   Manipulative  May blame others when mistakes are made  May argue with suggestions  May be inconsistent with carryover  May resist attempts to provide assistance  Inflexible  May insist upon using an inefficient way of doing things  Conversations may be concrete and self centered  Abstract reasoning may not be understood  Grooming: Completes grooming activity independently  May forget newly learned techniques  May fail to anticipate need to purchase grooming supplies  May not read directions before using new products  Dressing:  May not consider social standards  May argue with suggestions to change selections  Bathing:  Completes bathing routine independently  May solve problems  May fail to anticipate secondary effects of new products like allergies  Walking/Exercising:  May forget newly discovered routes and may have to rediscover them  May refuse to comply with exercise program   Eating:  Eats and talks with others  May not see crumbs or small spills  May need help to comply with dietary restrictions  Toileting:  Independently performs all toileting  Can explore a new environment to locate a bathroom without assistance  May not anticipate needs for new toileting solutions  Medications:  Opens new containers  May understand medication schedules but has trouble taking them on time  May choose to discontinue medications  May recognize side effects like tremors or sedation  Utilize daily pill box  Use of adaptive equipment:  May choose not to use equipment  May utilize in unsafe manner  Housekeeping:  Able to learn new sequence of actions  Does not change or alter pace  May set and follow a weekly schedule  Identifies potential hazards posed by electric, gas, toxins, poisons or improper storage/disposal of items  Food preparation:  May make reservations to eat out but may fail to remember and arrive late  Passes heavy serving dishes safely  May forget discovered solutions to problems  Spending money:  May be able to identify monthly budget of routine expenses but may have trouble adhering to it  May run up bills or overextend credit  May have trouble balancing checkbook  Does not plan for long term financial security  Shopping:  May make daily or weekly schedule of shopping needs with trouble adhering to it  May be impulsive in spending  May discriminate between stores, labels and brands  Laundry:  May set and follow weekly schedule with difficulty adhering to it  Learns new sequence of actions  Uses iron effectively  Traveling: Can make a schedule for traveling to new locations but has difficulty adhering to it  May miss bus  May need assistance to read and understand new schedules  Telephone:  Learns new telephone procedures by initiating several steps at a time  May look up new numbers  May not use yellow pages  May become confused by new options or ignore call waiting  May run up large phone bills  Driving:  Should NOT operate a motor vehicle         Karen Cox [address], OTR/L

## 2020-09-04 NOTE — PROGRESS NOTES
09/04/20 0859   Team Meeting   Meeting Type Daily Rounds   Initial Conference Date 09/04/20   Team Members Present   Team Members Present Physician;Nurse;;Occupational Therapist   Physician Team Member [facility] Team Member [facility] RN   Care Management Team Member [place]   OT Team Member Hermenia Goldberg   Patient/Family Present   Patient Present No   Patient's Family Present No     Moving to [address] today, visible, pleasant, reported VH 2 nights ago but no VH last night, slept well, anticipating d/c early next week

## 2020-09-04 NOTE — QUICK NOTE
Progress Note - Behavioral Health   Kathe Felty 82 y o  female MRN: [id number]  Unit/Bed#: [facility] 704-32 Encounter: [id number]    Assessment/Plan   Principal Problem:    Psychotic disorder ([place] [address] )  Active Problems:    Diabetes mellitus type 2, noninsulin dependent ([place] [address] )    Medical clearance for psychiatric admission    Acquired hypothyroidism    Coronary artery disease involving native coronary artery of native heart without angina pectoris    Paroxysmal atrial fibrillation ([facility])    Ischemic cardiomyopathy    Mild neurocognitive disorder    CKD (chronic kidney disease) stage 3, GFR 30-59 ml/min ([facility])      Behavior over the last 24 hours:  improved  Sleep: normal  Appetite: normal  Medication side effects: No  ROS: pain in right leg    Mental Status Evaluation:  Appearance:  age appropriate   Behavior:  normal   Speech:  normal pitch and normal volume   Mood:  normal   Affect:  normal   Thought Process:  normal   Thought Content:  normal   Perceptual Disturbances: None   Risk Potential: Suicidal Ideations none  Homicidal Ideations none  Potential for Aggression No   Sensorium:  person, place and time/date   Memory:  recent and remote memory grossly intact   Consciousness:  alert    Attention: attention span and concentration were age appropriate   Insight:  fair   Judgment: fair   Gait/Station: normal gait/station and normal balance   Motor Activity: no abnormal movements     Progress Toward Goals: Rip Bhatt slept well last night  She says that she did not see the hallucinations yesterday/last night and that she is feeling pretty good  Will continue to monitor for the reappearance of symptoms  Recommended Treatment: Continue with group therapy, milieu therapy and occupational therapy  Risks, benefits and possible side effects of Medications:   Risks, benefits, and possible side effects of medications explained to patient and patient verbalizes understanding        Medications: all current active meds have been reviewed  Labs: I have personally reviewed all pertinent laboratory/tests results  No results found for this or any previous visit (from the past 24 hour(s))  Counseling / Coordination of Care  Total floor / unit time spent today 20 minutes  Greater than 50% of total time was spent with the patient and / or family counseling and / or coordination of care  A description of the counseling / coordination of care: discussing symptoms and plans

## 2020-09-04 NOTE — CASE MANAGEMENT
Anticipating pt's d/c on 9/8  Left VM for pt's daughter, Annamaria Fernandez, to give update on d/c plan

## 2020-09-05 PROCEDURE — 99232 SBSQ HOSP IP/OBS MODERATE 35: CPT | Performed by: PSYCHIATRY & NEUROLOGY

## 2020-09-05 RX ORDER — PANTOPRAZOLE SODIUM 20 MG/1
20 TABLET, DELAYED RELEASE ORAL
Status: DISCONTINUED | OUTPATIENT
Start: 2020-09-05 | End: 2020-09-07

## 2020-09-05 RX ADMIN — SERTRALINE HYDROCHLORIDE 50 MG: 50 TABLET ORAL at 08:38

## 2020-09-05 RX ADMIN — GLIMEPIRIDE 2 MG: 2 TABLET ORAL at 08:38

## 2020-09-05 RX ADMIN — LEVOTHYROXINE SODIUM 100 MCG: 100 TABLET ORAL at 06:01

## 2020-09-05 RX ADMIN — FUROSEMIDE 40 MG: 40 TABLET ORAL at 08:38

## 2020-09-05 RX ADMIN — FAMOTIDINE 20 MG: 20 TABLET ORAL at 08:38

## 2020-09-05 RX ADMIN — ARIPIPRAZOLE 10 MG: 10 TABLET ORAL at 08:38

## 2020-09-05 RX ADMIN — WARFARIN SODIUM 2 MG: 2 TABLET ORAL at 17:17

## 2020-09-05 RX ADMIN — POTASSIUM CHLORIDE 20 MEQ: 20 TABLET, EXTENDED RELEASE ORAL at 08:38

## 2020-09-05 RX ADMIN — METOPROLOL TARTRATE 25 MG: 25 TABLET, FILM COATED ORAL at 08:38

## 2020-09-05 RX ADMIN — ASPIRIN 81 MG: 81 TABLET, COATED ORAL at 08:38

## 2020-09-05 RX ADMIN — MELATONIN TAB 3 MG 6 MG: 3 TAB at 21:41

## 2020-09-05 RX ADMIN — FOLIC ACID 1 MG: 1 TABLET ORAL at 08:38

## 2020-09-05 NOTE — PROGRESS NOTES
Psychiatrist Progress Note - Behavioral Health   Ashia Jacobs 82 y o  female MRN: [id number]  Unit/Bed#: Fr[facility] 969-18 Encounter: [id number]    The patient was seen for continuing care and reviewed with treatment team  Staff report patient has been calm and cooperative  Patient states she slept well, probably because her disruptive roommate was no longer there  She does reports she had no hallucinations last night  No delusions elicited in the absence of hallucinations  Denies SI  Denies adverse effects of medications       Mental Status Evaluation:  Appearance: adequately groomed, fair hygiene; slow, steady gait with rollator; no abnormal involuntary movements noted  Behavior: calm, cooperative, pleasant  Speech: wnl  Mood: anxious to go home  Affect: neutral, stable, reactive  Thought process: linear, logical  Thought content: no delusions elicited; denies SI/HI  Perceptual disturbances: denies AH/VH  Cognition: adequately oriented    Insight: partially intact  Judgement: fluctuating    Vitals:    09/03/20 1450 09/03/20 2100 09/04/20 0625 09/04/20 1458   BP: 119/60 130/67 103/59 125/53   BP Location: Left arm Left arm Left arm Right arm   Pulse: 81 91 73 86   Resp: 18 18 16 19   Temp: 97 8 °F (36 6 °C) 98 4 °F (36 9 °C) 97 5 °F (36 4 °C) 98 2 °F (36 8 °C)   TempSrc: Oral Tympanic Tympanic Temporal   SpO2: 95% 97% 93% 95%   Weight:       Height:           Current Facility-Administered Medications   Medication Dose Route Frequency Provider Last Rate    acetaminophen  650 mg Oral Q6H PRN Whitley Meyers MD      aluminum-magnesium hydroxide-simethicone  30 mL Oral Q4H PRN Whitley Meyers MD      ARIPiprazole  10 mg Oral Daily Carol Beasley MD      aspirin  81 mg Oral Daily Whitley Meyers MD      famotidine  20 mg Oral Daily Whitley Meyers MD      folic acid  1 mg Oral Daily Whitley Meyers MD      furosemide  40 mg Oral Daily Whitley Meyers MD      glimepiride  2 mg Oral Daily With Breakfast MD Abby Johnson haloperidol  2 5 mg Oral Q6H PRN Ascencion Martinez MD      haloperidol lactate  2 5 mg Intramuscular Q1H PRN Ascencion Martinez MD      levothyroxine  100 mcg Oral Early Morning Ascencion Martinez MD      melatonin  6 mg Oral HS Ginny Gay MD      metoprolol tartrate  25 mg Oral Q12H [facility] Ascencion Martinez MD      polyethylene glycol  17 g Oral Daily PRN Ascencion Martinez MD      potassium chloride  20 mEq Oral Daily Ascencion Martinez MD      sertraline  25 mg Oral Daily Ascencion Martinez MD      warfarin  2 mg Oral Daily (warfarin) Claudy Banks MD             Assessment/Plan    Principal Problem:    Psychotic disorder [facility])  Active Problems:    Diabetes mellitus type 2, noninsulin dependent ([facility] [address] )    Medical clearance for psychiatric admission    Acquired hypothyroidism    Coronary artery disease involving native coronary artery of native heart without angina pectoris    Paroxysmal atrial fibrillation (HCC)    Ischemic cardiomyopathy    Mild neurocognitive disorder    CKD (chronic kidney disease) stage 3, GFR 30-59 ml/min ([facility])      Progress Toward Goals: improved    Recommended Treatment:   - no medications changes as patient had a good night - would increase abilify should hallucinations return  - Continue with pharmacotherapy, group therapy, milieu therapy and occupational therapy  Risks, benefits and possible side effects of Medications:   Risks, benefits, and possible side effects of medications explained to patient and patient verbalizes understanding

## 2020-09-05 NOTE — NURSING NOTE
Pt isolative to self in room  Med and meal compliant  Pt states she feels "better"  Denies SI/HI, AH/VH  Denies any signs and symptoms  Offers no other complaints at this time  Will continue to monitor

## 2020-09-05 NOTE — PROGRESS NOTES
C/O"  have stomach upset , at home I take a medication at home and it helps me had and it helps me ,can you start me on some thing'    Report from staff regarding this patient received and record reviewed  prior to seeing this patient   Behavior over the last 24 hours: This patient was seen at her room  But like she has taken a shower and she was coming, reports that she is doing okay except her stomach is upset and she told me that she has taken medication at home for stomach upset often on them like something for stomach upset we discussed different medication decided on omeprazole 20 mg a day, I educated regarding side effect benefit of it  She is taking Abilify 10 mg and Zoloft 50 mg, she reports she slept well, had a fairly good breakfast, denies any issues  Sleep:  Good  Appetite:  Okay  Medication side effects:  Reports some stomach upset not quite sure it is due to the side effect of medication  ROS:  Improved  Mental Status Evaluation:  Appearance:  Dressed appropraitely, short-statured stocky build likable woman pleasant smile   Behavior:  cooperative   Speech:  Normal but speak in a low volume   Mood:  Admission   Affect:  appropriate     Thought Process:  Goal directed   Thought Content:  normal   Perceptual Disturbances: Denied AV hallucination   Risk Potential: NO MARCEL    Sensorium:  normal   Cognition:  intact   Consciousness:  Alert, OX3   Attention: Fair   Insight:  fair   Judgment: fair   Gait/Station: With in normal range but she walks with a walker   Motor Activity: With in normal range     Progress Toward Goals: working on current treatment goals, no changes  Made in treatment plan   Recommended Treatment: Continue with group therapy, milieu therapy and occupational therapy  Risks, benefits and possible side effects of Medications:   Risks, benefits, and possible side effects of medications explained to patient and patient verbalizes understanding        Medications:   current meds: Current Facility-Administered Medications   Medication Dose Route Frequency    acetaminophen (TYLENOL) tablet 650 mg  650 mg Oral Q6H PRN    aluminum-magnesium hydroxide-simethicone (MYLANTA) 200-200-20 mg/5 mL oral suspension 30 mL  30 mL Oral Q4H PRN    ARIPiprazole (ABILIFY) tablet 10 mg  10 mg Oral Daily    aspirin (ECOTRIN LOW STRENGTH) EC tablet 81 mg  81 mg Oral Daily    famotidine (PEPCID) tablet 20 mg  20 mg Oral Daily    folic acid (FOLVITE) tablet 1 mg  1 mg Oral Daily    furosemide (LASIX) tablet 40 mg  40 mg Oral Daily    glimepiride (AMARYL) tablet 2 mg  2 mg Oral Daily With Breakfast    haloperidol (HALDOL) tablet 2 5 mg  2 5 mg Oral Q6H PRN    haloperidol lactate (HALDOL) injection 2 5 mg  2 5 mg Intramuscular Q1H PRN    levothyroxine tablet 100 mcg  100 mcg Oral Early Morning    melatonin tablet 6 mg  6 mg Oral HS    metoprolol tartrate (LOPRESSOR) tablet 25 mg  25 mg Oral Q12H ZACK    pantoprazole (PROTONIX) EC tablet 20 mg  20 mg Oral Early Morning    polyethylene glycol (MIRALAX) packet 17 g  17 g Oral Daily PRN    potassium chloride (K-DUR,KLOR-CON) CR tablet 20 mEq  20 mEq Oral Daily    sertraline (ZOLOFT) tablet 50 mg  50 mg Oral Daily    warfarin (COUMADIN) tablet 2 mg  2 mg Oral Daily (warfarin)     Labs: NA    Assessment, Diagnosis  and Plan: continue with current meds and goals, F/U tomorrow    Counseling / Coordination of Care  Total floor / unit time spent today30 minutes  minutes  Greater than 50% of total time was spent with the patient and / or family counseling and / or coordination of care   A description of the counseling / coordination of care:  I added omeprazole 20 mg a day for stomach upset, continue psychiatric medication follow-up tomorrow on the unit    Anastasia Ramirez MD

## 2020-09-05 NOTE — NURSING NOTE
Patient was isolative to her room this evening  She denied anxiety and depression as well as hallucinations  She is pleasant and cooperative  Patient was given prn Mylanta for heart burn at 2203 with relief  Compliant with medications  Patient is in bed  Appears to be sleeping  Monitored on q 7 minute checks

## 2020-09-05 NOTE — PLAN OF CARE
Problem: Alteration in Thoughts and Perception  Goal: Agree to be compliant with medication regime, as prescribed and report medication side effects  Description: Interventions:  - Offer appropriate PRN medication and supervise ingestion; conduct AIMS, as needed   9/4/2020 2308 by David Pena RN  Outcome: Progressing  9/4/2020 2307 by David Pena RN  Outcome: Progressing  Goal: Attend and participate in unit activities, including therapeutic, recreational, and educational groups  Description: Interventions:  -Encourage Visitation and family involvement in care  9/4/2020 2308 by David Pena RN  Outcome: Not Progressing  9/4/2020 2307 by David Pena RN  Outcome: Not Progressing  Goal: Complete daily ADLs, including personal hygiene independently, as able  Description: Interventions:  - Observe, teach, and assist patient with ADLS  - Monitor and promote a balance of rest/activity, with adequate nutrition and elimination   9/4/2020 2308 by David Pena RN  Outcome: Progressing  9/4/2020 2307 by David Pena RN  Outcome: Progressing     Problem: Depression  Goal: Verbalize thoughts and feelings  Description: Interventions:  - Assess and re-assess patient's level of risk   - Engage patient in 1:1 interactions, daily, for a minimum of 15 minutes   - Encourage patient to express feelings, fears, frustrations, hopes   9/4/2020 2308 by David Pena RN  Outcome: Progressing  9/4/2020 2307 by David Pena RN  Outcome: Progressing  Goal: Refrain from harming self  Description: Interventions:  - Monitor patient closely, per order   - Supervise medication ingestion, monitor effects and side effects   9/4/2020 2308 by David Pena RN  Outcome: Progressing  9/4/2020 2307 by David Pena RN  Outcome: Progressing  Goal: Refrain from isolation  Description: Interventions:  - Develop a trusting relationship   - Encourage socialization   Outcome: Not Progressing

## 2020-09-06 PROCEDURE — 99232 SBSQ HOSP IP/OBS MODERATE 35: CPT | Performed by: PSYCHIATRY & NEUROLOGY

## 2020-09-06 RX ADMIN — FOLIC ACID 1 MG: 1 TABLET ORAL at 08:45

## 2020-09-06 RX ADMIN — METOPROLOL TARTRATE 25 MG: 25 TABLET, FILM COATED ORAL at 21:22

## 2020-09-06 RX ADMIN — MELATONIN TAB 3 MG 6 MG: 3 TAB at 21:22

## 2020-09-06 RX ADMIN — SERTRALINE HYDROCHLORIDE 50 MG: 50 TABLET ORAL at 08:46

## 2020-09-06 RX ADMIN — PANTOPRAZOLE SODIUM 20 MG: 20 TABLET, DELAYED RELEASE ORAL at 06:00

## 2020-09-06 RX ADMIN — POTASSIUM CHLORIDE 20 MEQ: 20 TABLET, EXTENDED RELEASE ORAL at 08:45

## 2020-09-06 RX ADMIN — LEVOTHYROXINE SODIUM 100 MCG: 100 TABLET ORAL at 06:00

## 2020-09-06 RX ADMIN — ASPIRIN 81 MG: 81 TABLET, COATED ORAL at 08:45

## 2020-09-06 RX ADMIN — GLIMEPIRIDE 2 MG: 2 TABLET ORAL at 08:46

## 2020-09-06 RX ADMIN — WARFARIN SODIUM 2 MG: 2 TABLET ORAL at 17:10

## 2020-09-06 RX ADMIN — ARIPIPRAZOLE 10 MG: 10 TABLET ORAL at 08:45

## 2020-09-06 RX ADMIN — FAMOTIDINE 20 MG: 20 TABLET ORAL at 08:45

## 2020-09-06 NOTE — NURSING NOTE
Patient remained isolative to her room  She denied depression, anxiety and hallucinations  She is pleasant and cooperative  She denied any needs  Blood pressure noted  Lopressor held  Compliant with medications  Monitored on q 7 minute checks

## 2020-09-06 NOTE — PROGRESS NOTES
C/O" I was feeling so tired after the break alem decided to take a nap  Report from staff regarding this patient received and record reviewed  prior to seeing this patient   Behavior over the last 24 hours:  Patient reports she is doing okay taking her medication denied any issues she slept well at night she has been med compliant and as well as food complaint  She denied any auditory visual hallucinations paranoia, reports she is doing well she feels she has improved since she came in, denied any side effects to the medications  Sleep:  Good  Appetite:  Good  Medication side effects:  Denied  ROS:  Improved  Mental Status Evaluation:  Appearance:  Dressed appropraitely, a retired white woman moderately obese pleasant   Behavior:  cooperative   Speech:  normal   Mood:  Slightly anxious   Affect:  appropriate     Thought Process:  Goal directed   Thought Content:  normal   Perceptual Disturbances: Denied AV hallucination   Risk Potential: NO MARCEL    Sensorium:  normal   Cognition:  intact   Consciousness:  Alert, OX2   Attention: Fair   Insight:  fair   Judgment: fair   Gait/Station: With in normal range   Motor Activity: With in normal range     Progress Toward Goals: working on current treatment goals, no changes  Made in treatment plan   Recommended Treatment: Continue with group therapy, milieu therapy and occupational therapy  Risks, benefits and possible side effects of Medications:   Risks, benefits, and possible side effects of medications explained to patient and patient verbalizes understanding        Medications:   current meds:   Current Facility-Administered Medications   Medication Dose Route Frequency    acetaminophen (TYLENOL) tablet 650 mg  650 mg Oral Q6H PRN    aluminum-magnesium hydroxide-simethicone (MYLANTA) 200-200-20 mg/5 mL oral suspension 30 mL  30 mL Oral Q4H PRN    ARIPiprazole (ABILIFY) tablet 10 mg  10 mg Oral Daily    aspirin (ECOTRIN LOW STRENGTH) EC tablet 81 mg 81 mg Oral Daily    famotidine (PEPCID) tablet 20 mg  20 mg Oral Daily    folic acid (FOLVITE) tablet 1 mg  1 mg Oral Daily    furosemide (LASIX) tablet 40 mg  40 mg Oral Daily    glimepiride (AMARYL) tablet 2 mg  2 mg Oral Daily With Breakfast    haloperidol (HALDOL) tablet 2 5 mg  2 5 mg Oral Q6H PRN    haloperidol lactate (HALDOL) injection 2 5 mg  2 5 mg Intramuscular Q1H PRN    levothyroxine tablet 100 mcg  100 mcg Oral Early Morning    melatonin tablet 6 mg  6 mg Oral HS    metoprolol tartrate (LOPRESSOR) tablet 25 mg  25 mg Oral Q12H ZACK    pantoprazole (PROTONIX) EC tablet 20 mg  20 mg Oral Early Morning    polyethylene glycol (MIRALAX) packet 17 g  17 g Oral Daily PRN    potassium chloride (K-DUR,KLOR-CON) CR tablet 20 mEq  20 mEq Oral Daily    sertraline (ZOLOFT) tablet 50 mg  50 mg Oral Daily    warfarin (COUMADIN) tablet 2 mg  2 mg Oral Daily (warfarin)     Labs: NA    Assessment, Diagnosis  and Plan: continue with current meds and goals, F/U tomorrow by another psychiatrist will be covering the issue    Counseling / Coordination of Care  Total floor / unit time spent today30 minutes  minutes  Greater than 50% of total time was spent with the patient and / or family counseling and / or coordination of care  A description of the counseling / coordination of care:      Fabricio Cuello MD

## 2020-09-06 NOTE — NURSING NOTE
Pt isolative to self in room  Med and meal compliant  Rates depression and anxiety both as "zero"  Pt states she's feeling "better"  Offers no complaints at this time  Will continue to monitor

## 2020-09-07 RX ORDER — LANOLIN ALCOHOL/MO/W.PET/CERES
6 CREAM (GRAM) TOPICAL
Qty: 30 TABLET | Refills: 0 | Status: ON HOLD | OUTPATIENT
Start: 2020-09-07 | End: 2020-10-16 | Stop reason: SDUPTHER

## 2020-09-07 RX ORDER — FUROSEMIDE 20 MG/1
20 TABLET ORAL DAILY
Qty: 30 TABLET | Refills: 0 | Status: ON HOLD | OUTPATIENT
Start: 2020-09-08 | End: 2020-10-16 | Stop reason: SDUPTHER

## 2020-09-07 RX ORDER — FAMOTIDINE 20 MG/1
20 TABLET, FILM COATED ORAL 2 TIMES DAILY
Qty: 60 TABLET | Refills: 0 | Status: CANCELLED | OUTPATIENT
Start: 2020-09-07

## 2020-09-07 RX ORDER — ASPIRIN 81 MG/1
81 TABLET ORAL DAILY
Qty: 30 TABLET | Refills: 0 | Status: ON HOLD | OUTPATIENT
Start: 2020-09-07 | End: 2020-10-16 | Stop reason: SDUPTHER

## 2020-09-07 RX ORDER — FAMOTIDINE 20 MG/1
20 TABLET, FILM COATED ORAL 2 TIMES DAILY
Qty: 60 TABLET | Refills: 0 | Status: ON HOLD | OUTPATIENT
Start: 2020-09-07 | End: 2020-10-16 | Stop reason: SDUPTHER

## 2020-09-07 RX ORDER — GLIMEPIRIDE 2 MG/1
2 TABLET ORAL
Qty: 30 TABLET | Refills: 0 | Status: SHIPPED | OUTPATIENT
Start: 2020-09-07 | End: 2020-10-16 | Stop reason: HOSPADM

## 2020-09-07 RX ORDER — LEVOTHYROXINE SODIUM 0.1 MG/1
100 TABLET ORAL
Qty: 30 TABLET | Refills: 0 | Status: ON HOLD | OUTPATIENT
Start: 2020-09-08 | End: 2020-10-16 | Stop reason: SDUPTHER

## 2020-09-07 RX ORDER — FUROSEMIDE 40 MG/1
40 TABLET ORAL EVERY OTHER DAY
Refills: 0 | Status: CANCELLED | OUTPATIENT
Start: 2020-09-07

## 2020-09-07 RX ORDER — FAMOTIDINE 20 MG/1
20 TABLET, FILM COATED ORAL 2 TIMES DAILY
Status: DISCONTINUED | OUTPATIENT
Start: 2020-09-07 | End: 2020-09-10 | Stop reason: HOSPADM

## 2020-09-07 RX ORDER — ARIPIPRAZOLE 10 MG/1
10 TABLET ORAL DAILY
Qty: 30 TABLET | Refills: 0 | Status: SHIPPED | OUTPATIENT
Start: 2020-09-08 | End: 2020-10-16 | Stop reason: HOSPADM

## 2020-09-07 RX ORDER — LANOLIN ALCOHOL/MO/W.PET/CERES
1000 CREAM (GRAM) TOPICAL DAILY
Qty: 30 TABLET | Refills: 0 | Status: CANCELLED | OUTPATIENT
Start: 2020-09-07

## 2020-09-07 RX ORDER — FOLIC ACID 1 MG/1
1 TABLET ORAL DAILY
Qty: 30 TABLET | Refills: 0 | Status: CANCELLED | OUTPATIENT
Start: 2020-09-07

## 2020-09-07 RX ORDER — POTASSIUM CHLORIDE 20 MEQ/1
20 TABLET, EXTENDED RELEASE ORAL DAILY
Qty: 30 TABLET | Refills: 0 | Status: ON HOLD | OUTPATIENT
Start: 2020-09-07 | End: 2020-10-16 | Stop reason: SDUPTHER

## 2020-09-07 RX ORDER — FUROSEMIDE 20 MG/1
20 TABLET ORAL DAILY
Status: DISCONTINUED | OUTPATIENT
Start: 2020-09-08 | End: 2020-09-10 | Stop reason: HOSPADM

## 2020-09-07 RX ORDER — FEXOFENADINE HCL 180 MG/1
180 TABLET ORAL DAILY
Refills: 0 | Status: CANCELLED | OUTPATIENT
Start: 2020-09-07

## 2020-09-07 RX ORDER — LANOLIN ALCOHOL/MO/W.PET/CERES
1000 CREAM (GRAM) TOPICAL DAILY
Qty: 30 TABLET | Refills: 0 | Status: ON HOLD | OUTPATIENT
Start: 2020-09-07 | End: 2020-10-16 | Stop reason: SDUPTHER

## 2020-09-07 RX ORDER — ASPIRIN 81 MG/1
81 TABLET ORAL DAILY
Qty: 30 TABLET | Refills: 0 | Status: CANCELLED | OUTPATIENT
Start: 2020-09-07

## 2020-09-07 RX ORDER — POTASSIUM CHLORIDE 20 MEQ/1
20 TABLET, EXTENDED RELEASE ORAL DAILY
Qty: 30 TABLET | Refills: 0 | Status: CANCELLED | OUTPATIENT
Start: 2020-09-07

## 2020-09-07 RX ORDER — FOLIC ACID 1 MG/1
1 TABLET ORAL DAILY
Qty: 30 TABLET | Refills: 0 | Status: ON HOLD | OUTPATIENT
Start: 2020-09-07 | End: 2020-10-16 | Stop reason: SDUPTHER

## 2020-09-07 RX ADMIN — FOLIC ACID 1 MG: 1 TABLET ORAL at 08:13

## 2020-09-07 RX ADMIN — POTASSIUM CHLORIDE 20 MEQ: 20 TABLET, EXTENDED RELEASE ORAL at 08:13

## 2020-09-07 RX ADMIN — GLIMEPIRIDE 2 MG: 2 TABLET ORAL at 08:12

## 2020-09-07 RX ADMIN — ASPIRIN 81 MG: 81 TABLET, COATED ORAL at 08:13

## 2020-09-07 RX ADMIN — LEVOTHYROXINE SODIUM 100 MCG: 100 TABLET ORAL at 05:39

## 2020-09-07 RX ADMIN — FAMOTIDINE 20 MG: 20 TABLET ORAL at 08:12

## 2020-09-07 RX ADMIN — ARIPIPRAZOLE 10 MG: 10 TABLET ORAL at 08:12

## 2020-09-07 RX ADMIN — SERTRALINE HYDROCHLORIDE 50 MG: 50 TABLET ORAL at 08:13

## 2020-09-07 RX ADMIN — PANTOPRAZOLE SODIUM 20 MG: 20 TABLET, DELAYED RELEASE ORAL at 05:39

## 2020-09-07 RX ADMIN — MELATONIN TAB 3 MG 6 MG: 3 TAB at 21:15

## 2020-09-07 RX ADMIN — WARFARIN SODIUM 2 MG: 2 TABLET ORAL at 17:11

## 2020-09-07 RX ADMIN — METOPROLOL TARTRATE 25 MG: 25 TABLET, FILM COATED ORAL at 21:15

## 2020-09-07 RX ADMIN — FAMOTIDINE 20 MG: 20 TABLET ORAL at 17:11

## 2020-09-07 NOTE — NURSING NOTE
Patient is present in the dayroom and is out for meals  Pt is medication compliant and cooperative with care  Pt is visible and social with select peers and staff  Pt is pleasant on approach  Pt reported VH of seeing people  States they do not interact with her and she does not know who they are  No signs of distress noted  Pt stated she is excited for discharge  Will continue to monitor frequently

## 2020-09-07 NOTE — PLAN OF CARE
Problem: Alteration in Thoughts and Perception  Goal: Treatment Goal: Gain control of psychotic behaviors/thinking, reduce/eliminate presenting symptoms and demonstrate improved reality functioning upon discharge  Outcome: Progressing  Goal: Verbalize thoughts and feelings  Description: Interventions:  - Promote a nonjudgmental and trusting relationship with the patient through active listening and therapeutic communication  - Assess patient's level of functioning, behavior and potential for risk  - Engage patient in 1 on 1 interactions  - Encourage patient to express fears, feelings, frustrations, and discuss symptoms    - [place] patient to reality, help patient recognize reality-based thinking   - Administer medications as ordered and assess for potential side effects  - Provide the patient education related to the signs and symptoms of the illness and desired effects of prescribed medications  Outcome: Progressing  Goal: Refrain from acting on delusional thinking/internal stimuli  Description: Interventions:  - Monitor patient closely, per order   - Utilize least restrictive measures   - Set reasonable limits, give positive feedback for acceptable   - Administer medications as ordered and monitor of potential side effects  Outcome: Progressing  Goal: Agree to be compliant with medication regime, as prescribed and report medication side effects  Description: Interventions:  - Offer appropriate PRN medication and supervise ingestion; conduct AIMS, as needed   Outcome: Progressing  Goal: Attend and participate in unit activities, including therapeutic, recreational, and educational groups  Description: Interventions:  -Encourage Visitation and family involvement in care  Outcome: Progressing  Goal: Recognize dysfunctional thoughts, communicate reality-based thoughts at the time of discharge  Description: Interventions:  - Provide medication and psycho-education to assist patient in compliance and developing insight into his/her illness   Outcome: Progressing  Goal: Complete daily ADLs, including personal hygiene independently, as able  Description: Interventions:  - Observe, teach, and assist patient with ADLS  - Monitor and promote a balance of rest/activity, with adequate nutrition and elimination   Outcome: Progressing     Problem: Depression  Goal: Treatment Goal: Demonstrate behavioral control of depressive symptoms, verbalize feelings of improved mood/affect, and adopt new coping skills prior to discharge  Outcome: Progressing  Goal: Verbalize thoughts and feelings  Description: Interventions:  - Assess and re-assess patient's level of risk   - Engage patient in 1:1 interactions, daily, for a minimum of 15 minutes   - Encourage patient to express feelings, fears, frustrations, hopes   Outcome: Progressing  Goal: Refrain from harming self  Description: Interventions:  - Monitor patient closely, per order   - Supervise medication ingestion, monitor effects and side effects   Outcome: Progressing  Goal: Refrain from isolation  Description: Interventions:  - Develop a trusting relationship   - Encourage socialization   Outcome: Progressing  Goal: Refrain from self-neglect  Outcome: Progressing  Goal: Attend and participate in unit activities, including therapeutic, recreational, and educational groups  Description: Interventions:  - Provide therapeutic and educational activities daily, encourage attendance and participation, and document same in the medical record   Outcome: Progressing  Goal: Complete daily ADLs, including personal hygiene independently, as able  Description: Interventions:  - Observe, teach, and assist patient with ADLS  -  Monitor and promote a balance of rest/activity, with adequate nutrition and elimination   Outcome: Progressing     Problem: Anxiety  Goal: Anxiety is at manageable level  Description: Interventions:  - Assess and monitor patient's anxiety level     - Monitor for signs and symptoms (heart palpitations, chest pain, shortness of breath, headaches, nausea, feeling jumpy, restlessness, irritable, apprehensive)  - Collaborate with interdisciplinary team and initiate plan and interventions as ordered  - [place] patient to unit/surroundings  - Explain treatment plan  - Encourage participation in care  - Encourage verbalization of concerns/fears  - Identify coping mechanisms  - Assist in developing anxiety-reducing skills  - Administer/offer alternative therapies  - Limit or eliminate stimulants  Outcome: Progressing     Problem: Potential for Falls  Goal: Patient will remain free of falls  Description: INTERVENTIONS:  - Assess patient frequently for physical needs  -  Identify cognitive and physical deficits and behaviors that affect risk of falls    -  Bear fall precautions as indicated by assessment   - Educate patient/family on patient safety including physical limitations  - Instruct patient to call for assistance with activity based on assessment  - Modify environment to reduce risk of injury  - Consider OT/PT consult to assist with strengthening/mobility  Outcome: Progressing

## 2020-09-07 NOTE — NURSING NOTE
Patient is pleasant and cooperative  She denies having any hallucinations  She did talk of the hallucinations she had prior to admission  Admits that she only had one hallucination when she first got here  She states she knows of the people in her hallucinations  That she knows them from somewhere  Patient denies SI, anxiety and depression  She is concerned if she will she them when she goes back to her apartment  VSS  No needs  Patient is in bed  Appears to be sleeping  Eyes closed  Monitored on q 7 minute checks

## 2020-09-07 NOTE — PLAN OF CARE
Problem: Alteration in Thoughts and Perception  Goal: Treatment Goal: Gain control of psychotic behaviors/thinking, reduce/eliminate presenting symptoms and demonstrate improved reality functioning upon discharge  Outcome: Progressing  Goal: Verbalize thoughts and feelings  Description: Interventions:  - Promote a nonjudgmental and trusting relationship with the patient through active listening and therapeutic communication  - Assess patient's level of functioning, behavior and potential for risk  - Engage patient in 1 on 1 interactions  - Encourage patient to express fears, feelings, frustrations, and discuss symptoms    - [place] patient to reality, help patient recognize reality-based thinking   - Administer medications as ordered and assess for potential side effects  - Provide the patient education related to the signs and symptoms of the illness and desired effects of prescribed medications  Outcome: Progressing  Goal: Refrain from acting on delusional thinking/internal stimuli  Description: Interventions:  - Monitor patient closely, per order   - Utilize least restrictive measures   - Set reasonable limits, give positive feedback for acceptable   - Administer medications as ordered and monitor of potential side effects  Outcome: Progressing  Goal: Agree to be compliant with medication regime, as prescribed and report medication side effects  Description: Interventions:  - Offer appropriate PRN medication and supervise ingestion; conduct AIMS, as needed   Outcome: Progressing  Goal: Attend and participate in unit activities, including therapeutic, recreational, and educational groups  Description: Interventions:  -Encourage Visitation and family involvement in care  Outcome: Progressing  Goal: Recognize dysfunctional thoughts, communicate reality-based thoughts at the time of discharge  Description: Interventions:  - Provide medication and psycho-education to assist patient in compliance and developing insight into his/her illness   Outcome: Progressing  Goal: Complete daily ADLs, including personal hygiene independently, as able  Description: Interventions:  - Observe, teach, and assist patient with ADLS  - Monitor and promote a balance of rest/activity, with adequate nutrition and elimination   Outcome: Progressing     Problem: Ineffective Coping  Goal: Cooperates with admission process  Description: Interventions:   - Complete admission process  Outcome: Progressing  Goal: Identifies ineffective coping skills  Outcome: Progressing  Goal: Identifies healthy coping skills  Outcome: Progressing  Goal: Demonstrates healthy coping skills  Outcome: Progressing  Goal: Participates in unit activities  Description: Interventions:  - Provide therapeutic environment   - Provide required programming   - Redirect inappropriate behaviors   Outcome: Progressing  Goal: Patient/Family participate in treatment and DC plans  Description: Interventions:  - Provide therapeutic environment  Outcome: Progressing  Goal: Patient/Family verbalizes awareness of resources  Outcome: Progressing  Goal: Understands least restrictive measures  Description: Interventions:  - Utilize least restrictive behavior  Outcome: Progressing  Goal: Free from restraint events  Description: - Utilize least restrictive measures   - Provide behavioral interventions   - Redirect inappropriate behaviors   Outcome: Progressing     Problem: Depression  Goal: Treatment Goal: Demonstrate behavioral control of depressive symptoms, verbalize feelings of improved mood/affect, and adopt new coping skills prior to discharge  Outcome: Progressing  Goal: Verbalize thoughts and feelings  Description: Interventions:  - Assess and re-assess patient's level of risk   - Engage patient in 1:1 interactions, daily, for a minimum of 15 minutes   - Encourage patient to express feelings, fears, frustrations, hopes   Outcome: Progressing  Goal: Refrain from harming self  Description: Interventions:  - Monitor patient closely, per order   - Supervise medication ingestion, monitor effects and side effects   Outcome: Progressing  Goal: Refrain from isolation  Description: Interventions:  - Develop a trusting relationship   - Encourage socialization   Outcome: Progressing  Goal: Refrain from self-neglect  Outcome: Progressing  Goal: Attend and participate in unit activities, including therapeutic, recreational, and educational groups  Description: Interventions:  - Provide therapeutic and educational activities daily, encourage attendance and participation, and document same in the medical record   Outcome: Progressing  Goal: Complete daily ADLs, including personal hygiene independently, as able  Description: Interventions:  - Observe, teach, and assist patient with ADLS  -  Monitor and promote a balance of rest/activity, with adequate nutrition and elimination   Outcome: Progressing     Problem: Anxiety  Goal: Anxiety is at manageable level  Description: Interventions:  - Assess and monitor patient's anxiety level  - Monitor for signs and symptoms (heart palpitations, chest pain, shortness of breath, headaches, nausea, feeling jumpy, restlessness, irritable, apprehensive)  - Collaborate with interdisciplinary team and initiate plan and interventions as ordered  - [place] patient to unit/surroundings  - Explain treatment plan  - Encourage participation in care  - Encourage verbalization of concerns/fears  - Identify coping mechanisms  - Assist in developing anxiety-reducing skills  - Administer/offer alternative therapies  - Limit or eliminate stimulants  Outcome: Progressing     Problem: Potential for Falls  Goal: Patient will remain free of falls  Description: INTERVENTIONS:  - Assess patient frequently for physical needs  -  Identify cognitive and physical deficits and behaviors that affect risk of falls    -  [place] fall precautions as indicated by assessment   - Educate patient/family on patient safety including physical limitations  - Instruct patient to call for assistance with activity based on assessment  - Modify environment to reduce risk of injury  - Consider OT/PT consult to assist with strengthening/mobility  Outcome: Progressing

## 2020-09-07 NOTE — PLAN OF CARE
Problem: Depression  Goal: Treatment Goal: Demonstrate behavioral control of depressive symptoms, verbalize feelings of improved mood/affect, and adopt new coping skills prior to discharge  Outcome: Progressing  Goal: Verbalize thoughts and feelings  Description: Interventions:  - Assess and re-assess patient's level of risk   - Engage patient in 1:1 interactions, daily, for a minimum of 15 minutes   - Encourage patient to express feelings, fears, frustrations, hopes   Outcome: Progressing  Goal: Refrain from harming self  Description: Interventions:  - Monitor patient closely, per order   - Supervise medication ingestion, monitor effects and side effects   Outcome: Progressing  Goal: Refrain from isolation  Description: Interventions:  - Develop a trusting relationship   - Encourage socialization   Outcome: Progressing  Goal: Refrain from self-neglect  Outcome: Progressing  Goal: Attend and participate in unit activities, including therapeutic, recreational, and educational groups  Description: Interventions:  - Provide therapeutic and educational activities daily, encourage attendance and participation, and document same in the medical record   Outcome: Progressing  Goal: Complete daily ADLs, including personal hygiene independently, as able  Description: Interventions:  - Observe, teach, and assist patient with ADLS  -  Monitor and promote a balance of rest/activity, with adequate nutrition and elimination   Outcome: Progressing     Problem: Anxiety  Goal: Anxiety is at manageable level  Description: Interventions:  - Assess and monitor patient's anxiety level  - Monitor for signs and symptoms (heart palpitations, chest pain, shortness of breath, headaches, nausea, feeling jumpy, restlessness, irritable, apprehensive)  - Collaborate with interdisciplinary team and initiate plan and interventions as ordered    - [place] patient to unit/surroundings  - Explain treatment plan  - Encourage participation in care  - Encourage verbalization of concerns/fears  - Identify coping mechanisms  - Assist in developing anxiety-reducing skills  - Administer/offer alternative therapies  - Limit or eliminate stimulants  Outcome: Progressing     Problem: Potential for Falls  Goal: Patient will remain free of falls  Description: INTERVENTIONS:  - Assess patient frequently for physical needs  -  Identify cognitive and physical deficits and behaviors that affect risk of falls    -  [place] fall precautions as indicated by assessment   - Educate patient/family on patient safety including physical limitations  - Instruct patient to call for assistance with activity based on assessment  - Modify environment to reduce risk of injury  - Consider OT/PT consult to assist with strengthening/mobility  Outcome: Progressing

## 2020-09-07 NOTE — NURSING NOTE
Spoke to patient's daughter Narinder Nguyễn  Narinder Nguyễn said she is able to  pt tomorrow at 12 noon or later  Narinder Nguyễn is also requesting all of her mother's scripts be sent electronically to the pharmacy

## 2020-09-07 NOTE — CASE MANAGEMENT
CM spoke with [address] for bubble-pack, they did receive e-scribed medications  Pharmacist reports they are two early to fill three medications; Levothyroxine, Potassium, and Pepcid  CM called Celia Olson, daughter, and informed her of such  Daughter reports that she found the Potassium, and the pepcid just need to be returned at the [facility], however they are unable to find the Levothyroxine  Daughter reports she has been working with [employer] and they are figuring it out  Daughter confirms discharge tomorrow  CM will continue to follow and provide services as needed

## 2020-09-07 NOTE — PROGRESS NOTES
Patient seen and examined this morning  Case discussed with staff and chart reviewed  Per the nurse reports the patient has been doing well and has no new issues to report the past 24 hours  She has not been experiencing any auditory visual hallucinations  She has been tolerating all her medications well without side effect  She reports that she is sleeping well at night  Appetite is adequate  There does not appear to be any delusional thought content  She feels that she has improved since her time of admission  Patient was very pleasant cooperative throughout the entire interview  Smiling and laughing appropriately  Patient verbalizes her readiness and willingness for discharge     Sleep:  Good  Appetite:  Good  Medication side effects:  None  ROS:  Improved  Mental Status Evaluation:  Appearance:  Appropriate dress  Behavior:  cooperative, pleasant   Speech:  normal   Mood:  Euthymic   Affect:  appropriate     Thought Process:  Goal directed   Thought Content:  normal   Perceptual Disturbances: Denied AV hallucination   Risk Potential: NO MARCEL    Sensorium:  normal   Cognition:  intact   Consciousness:  Alert, OX2   Attention: Fair   Insight:  fair   Judgment: fair   Gait/Station: With in normal range   Motor Activity: With in normal range     Progress Toward Goals:  Patient continues to make slow and steady progress  Recommended Treatment: Continue with group therapy, milieu therapy and occupational therapy  Risks, benefits and possible side effects of Medications:   Risks, benefits, and possible side effects of medications explained to patient and patient verbalizes understanding        Medications:   current meds:   Current Facility-Administered Medications   Medication Dose Route Frequency    acetaminophen (TYLENOL) tablet 650 mg  650 mg Oral Q6H PRN    aluminum-magnesium hydroxide-simethicone (MYLANTA) 200-200-20 mg/5 mL oral suspension 30 mL  30 mL Oral Q4H PRN    ARIPiprazole (ABILIFY) tablet 10 mg  10 mg Oral Daily    aspirin (ECOTRIN LOW STRENGTH) EC tablet 81 mg  81 mg Oral Daily    famotidine (PEPCID) tablet 20 mg  20 mg Oral Daily    folic acid (FOLVITE) tablet 1 mg  1 mg Oral Daily    furosemide (LASIX) tablet 40 mg  40 mg Oral Daily    glimepiride (AMARYL) tablet 2 mg  2 mg Oral Daily With Breakfast    haloperidol (HALDOL) tablet 2 5 mg  2 5 mg Oral Q6H PRN    haloperidol lactate (HALDOL) injection 2 5 mg  2 5 mg Intramuscular Q1H PRN    levothyroxine tablet 100 mcg  100 mcg Oral Early Morning    melatonin tablet 6 mg  6 mg Oral HS    metoprolol tartrate (LOPRESSOR) tablet 25 mg  25 mg Oral Q12H ZACK    pantoprazole (PROTONIX) EC tablet 20 mg  20 mg Oral Early Morning    polyethylene glycol (MIRALAX) packet 17 g  17 g Oral Daily PRN    potassium chloride (K-DUR,KLOR-CON) CR tablet 20 mEq  20 mEq Oral Daily    sertraline (ZOLOFT) tablet 50 mg  50 mg Oral Daily    warfarin (COUMADIN) tablet 2 mg  2 mg Oral Daily (warfarin)     Labs:  Reviewed    Assessment, Diagnosis  and Plan:  Continue current medications at current dosages    Counseling / Coordination of Care  Total floor / unit time spent today30 minutes  minutes  Greater than 50% of total time was spent with the patient and / or family counseling and / or coordination of care      Stephanie Omalley PA-C

## 2020-09-08 LAB
INR PPP: 1.48 (ref 0.84–1.19)
PROTHROMBIN TIME: 18 SECONDS (ref 11.6–14.5)

## 2020-09-08 PROCEDURE — 85610 PROTHROMBIN TIME: CPT | Performed by: PSYCHIATRY & NEUROLOGY

## 2020-09-08 PROCEDURE — 99232 SBSQ HOSP IP/OBS MODERATE 35: CPT | Performed by: PSYCHIATRY & NEUROLOGY

## 2020-09-08 RX ORDER — WARFARIN SODIUM 2 MG/1
4 TABLET ORAL
Status: DISCONTINUED | OUTPATIENT
Start: 2020-09-08 | End: 2020-09-10 | Stop reason: HOSPADM

## 2020-09-08 RX ADMIN — GLIMEPIRIDE 2 MG: 2 TABLET ORAL at 08:13

## 2020-09-08 RX ADMIN — ARIPIPRAZOLE 10 MG: 10 TABLET ORAL at 08:13

## 2020-09-08 RX ADMIN — FAMOTIDINE 20 MG: 20 TABLET ORAL at 08:13

## 2020-09-08 RX ADMIN — WARFARIN SODIUM 4 MG: 2 TABLET ORAL at 17:10

## 2020-09-08 RX ADMIN — MELATONIN TAB 3 MG 6 MG: 3 TAB at 21:30

## 2020-09-08 RX ADMIN — FAMOTIDINE 20 MG: 20 TABLET ORAL at 17:10

## 2020-09-08 RX ADMIN — METOPROLOL TARTRATE 25 MG: 25 TABLET, FILM COATED ORAL at 21:30

## 2020-09-08 RX ADMIN — POTASSIUM CHLORIDE 20 MEQ: 20 TABLET, EXTENDED RELEASE ORAL at 08:13

## 2020-09-08 RX ADMIN — FUROSEMIDE 20 MG: 20 TABLET ORAL at 08:13

## 2020-09-08 RX ADMIN — ASPIRIN 81 MG: 81 TABLET, COATED ORAL at 08:13

## 2020-09-08 RX ADMIN — FOLIC ACID 1 MG: 1 TABLET ORAL at 08:13

## 2020-09-08 RX ADMIN — METOPROLOL TARTRATE 25 MG: 25 TABLET, FILM COATED ORAL at 08:13

## 2020-09-08 RX ADMIN — LEVOTHYROXINE SODIUM 100 MCG: 100 TABLET ORAL at 06:05

## 2020-09-08 RX ADMIN — SERTRALINE HYDROCHLORIDE 50 MG: 50 TABLET ORAL at 08:13

## 2020-09-08 NOTE — PROGRESS NOTES
Pt attended 2/3 groups  Pt was calm and able to self reflect when prompted  Pt sat with another peer and social      09/08/20 1330   Activity/Group Checklist   Group Other (Comment)  (positive coping skills: values)   Attendance Attended   Attendance Duration (min) 46-60   Interactions Interacted appropriately   Affect/Mood Blunted/flat   Goals Achieved Identified feelings; Discussed self-esteem issues; Able to listen to others; Able to engage in interactions

## 2020-09-08 NOTE — TREATMENT TEAM
09/08/20 1100   Service   Service SLIM   Provider Name dr Radha Cortes   Pending Pathology and Pertinent Tests Most recent lab data reviewed   Level of care Provider to update level of care   PT/INR results reported to dr Christine Brumfield

## 2020-09-08 NOTE — NURSING NOTE
Patient is pleasant and cooperative with the care and medication  No c/o of depression and anxiety  Patient stated that she is anxious to go home and check if she still see people   In the hospital she stated that she saw eyes and ears this weekend but not today   Patient denies SI

## 2020-09-08 NOTE — PLAN OF CARE
Problem: DISCHARGE PLANNING  Goal: Discharge to home or other facility with appropriate resources  Description: INTERVENTIONS:  - Identify barriers to discharge w/patient and caregiver  - Arrange for needed discharge resources and transportation as appropriate  - Identify discharge learning needs (meds, wound care, etc )  - Arrange for interpretive services to assist at discharge as needed  - Refer to Case Management Department for coordinating discharge planning if the patient needs post-hospital services based on physician/advanced practitioner order or complex needs related to functional status, cognitive ability, or social support system  Outcome: Completed     Discharge planning discussed with pt and pt's daughter  IMM letter signed  OP psych intake appt scheduled  Transportation provided by pt's daughter --  around 12pm  Aging referral made on pt's behalf  Scripts sent to pharmacy for bubble-packing and delivery

## 2020-09-08 NOTE — PROGRESS NOTES
09/08/20 5824   Team Meeting   Meeting Type Daily Rounds   Initial Conference Date 09/08/20   Team Members Present   Team Members Present Nurse;Physician;;   Physician Team Member Dr Laney Parnell Team Member Pablo Del Toro RN   Care Management Team Member [address] [place] [address] Work Team Member Toni Shelton   Patient/Family Present   Patient Present No   Patient's Family Present No     Denies depression and anxiety; reported VH of people last night, slept well, remains social  Postpone d/c until pt is free from [facility]

## 2020-09-08 NOTE — PROGRESS NOTES
Progress Note - Behavioral Health   Pura Power 82 y o  female MRN: [id number]  Unit/Bed#: [facility] 134-43 Encounter: [id number]    Assessment/Plan   Principal Problem:    Psychotic disorder ([place] [address] )  Active Problems:    Diabetes mellitus type 2, noninsulin dependent ([place] [address] )    Medical clearance for psychiatric admission    Acquired hypothyroidism    Coronary artery disease involving native coronary artery of native heart without angina pectoris    Paroxysmal atrial fibrillation ([facility])    Ischemic cardiomyopathy    Mild neurocognitive disorder    CKD (chronic kidney disease) stage 3, GFR 30-59 ml/min ([facility])      Recommended Treatment:   Continue current medications  Patient requires additional inpatient hospitalization due to continued visual hallucinations of people she believes mean to harm her    Continue with group therapy, milieu therapy and occupational therapy  Continue frequent safety checks and vitals per unit protocol  Case discussed with treatment team   Risks, benefits and possible side effects of Medications: Risks, benefits, and possible side effects of medications have been explained to the patient, who verbalizes understanding       ------------------------------------------------------------    Subjective: Per nursing report, Pratik has been pleasant and social on the unit but did report visual hallucinations last night of people who were not bothering her  Today, Pratik was pleasant in interview, reporting good sleep overnight and asking appropriate questions regarding her medications  However, she states the last time she had visual hallucinations was last Tuesday or Wednesday, even though she has reported hallucinations multiple times since then both to this writer and to multiple other staff members  Despite this, she states she has been telling herself that the hallucinations will not be present when she goes home in order to reassure herself    She states she would like to be discharged home today and plans to spend some time with her daughter before returning to independent living, but she was willing to stay additional days for improved stabilization  Progress Toward Goals: slow improvement    Psychiatric Review of Systems:  Behavior over the last 24 hours: unchanged  Sleep: improved  Appetite: adequate  Medication side effects: none  ROS: Complete review of systems is negative except as noted above      Vital signs in last 24 hours:  Temp:  [96 5 °F (35 8 °C)-98 1 °F (36 7 °C)] 96 5 °F (35 8 °C)  HR:  [66-79] 66  Resp:  [16] 16  BP: (125-135)/(58-89) 135/89    Mental Status Evaluation:  Appearance:  alert, good eye contact, appears stated age and appropriate grooming and hygiene   Behavior:  sitting comfortably, no abnormal movements and Slow but steady gait with rolling walker   Attitude:  pleasant and cooperative   Speech:  spontaneous, clear, normal rate, normal volume and coherent   Mood:  "Good"   Affect:  mood-congruent and constricted   Thought Process:  organized, coherent, linear, goal directed, normal rate of thoughts   Thought Content: no overt paranoia, ruminating thoughts   Perceptual disturbances: visual hallucinations Of multiple people who mean to harm her, last reported last night, and does not appear to be responding to internal stimuli at this time   Risk Potential: No active or passive suicidal or homicidal ideation, Low potential for aggression based on previous behavior   Cognition: oriented to person, place, time, and situation, appears to be of average intelligence and impaired naming   Insight:  Limited   Judgment: Limited     Current Medications:  Current Facility-Administered Medications   Medication Dose Route Frequency Provider Last Rate    acetaminophen  650 mg Oral Q6H PRN Cande MD Panfilo      aluminum-magnesium hydroxide-simethicone  30 mL Oral Q4H PRN Cande Whittaker MD      ARIPiprazole  10 mg Oral Daily Priscilla Bernstein MD      aspirin  81 mg Oral Daily Cande Whittaker, MD      famotidine  20 mg Oral BID Cande Panfilo, MD      folic acid  1 mg Oral Daily Cande Panfilo, MD      furosemide  20 mg Oral Daily Cande Panfilo, MD      glimepiride  2 mg Oral Daily With Breakfast Candejulia Whittaker MD      haloperidol  2 5 mg Oral Q6H PRN Cande Panfilo, MD      haloperidol lactate  2 5 mg Intramuscular Q1H PRN Cande Panfilo, MD      levothyroxine  100 mcg Oral Early Morning Cande Panfilo, MD      melatonin  6 mg Oral HS Priscilla Bernstein MD      metoprolol tartrate  25 mg Oral Q12H [facility] Cande Scarce, MD      polyethylene glycol  17 g Oral Daily PRN Cande Panfilo, MD      potassium chloride  20 mEq Oral Daily Cande Panfilo, MD      sertraline  50 mg Oral Daily Cande Panfilo, MD      warfarin  2 mg Oral Daily (warfarin) Chiquis Leon MD         Behavioral Health Medications: all current active meds have been reviewed  Changes as in plan section above  Laboratory results:  I have personally reviewed all pertinent laboratory/tests results  Recent Results (from the past 50 hour(s))   Protime-INR    Collection Time: 09/08/20  5:40 AM   Result Value Ref Range    Protime 18 0 (H) 11 6 - 14 5 seconds    INR 1 48 (H) 0 84 - 1 19        This note has been constructed using a voice recognition system  There may be translation, syntax, or grammatical errors  If you have any questions, please contact the dictating provider

## 2020-09-08 NOTE — PLAN OF CARE
Pt did engage in 2 of 2 groups and participated when prompted    Problem: Ineffective Coping  Goal: Participates in unit activities  Description: Interventions:  - Provide therapeutic environment   - Provide required programming   - Redirect inappropriate behaviors   Outcome: Progressing

## 2020-09-08 NOTE — PROGRESS NOTES
09/08/20 1000   Activity/Group Checklist   Group Community meeting  (discussion on communication,memory,literacy)   Attendance Attended   Attendance Duration (min) 16-30   Interactions Other (Comment)  (able to recall a joke given to her )   Affect/Mood Blunted/flat  (slight laughter when discussing humor )   Goals Achieved Able to engage in interactions; Able to listen to others;Discussed coping strategies

## 2020-09-08 NOTE — PLAN OF CARE
Problem: Alteration in Thoughts and Perception  Goal: Treatment Goal: Gain control of psychotic behaviors/thinking, reduce/eliminate presenting symptoms and demonstrate improved reality functioning upon discharge  Outcome: Progressing  Goal: Verbalize thoughts and feelings  Description: Interventions:  - Promote a nonjudgmental and trusting relationship with the patient through active listening and therapeutic communication  - Assess patient's level of functioning, behavior and potential for risk  - Engage patient in 1 on 1 interactions  - Encourage patient to express fears, feelings, frustrations, and discuss symptoms    - [place] patient to reality, help patient recognize reality-based thinking   - Administer medications as ordered and assess for potential side effects  - Provide the patient education related to the signs and symptoms of the illness and desired effects of prescribed medications  Outcome: Progressing  Goal: Refrain from acting on delusional thinking/internal stimuli  Description: Interventions:  - Monitor patient closely, per order   - Utilize least restrictive measures   - Set reasonable limits, give positive feedback for acceptable   - Administer medications as ordered and monitor of potential side effects  Outcome: Progressing  Goal: Agree to be compliant with medication regime, as prescribed and report medication side effects  Description: Interventions:  - Offer appropriate PRN medication and supervise ingestion; conduct AIMS, as needed   Outcome: Progressing  Goal: Attend and participate in unit activities, including therapeutic, recreational, and educational groups  Description: Interventions:  -Encourage Visitation and family involvement in care  Outcome: Progressing  Goal: Recognize dysfunctional thoughts, communicate reality-based thoughts at the time of discharge  Description: Interventions:  - Provide medication and psycho-education to assist patient in compliance and developing insight into his/her illness   Outcome: Progressing  Goal: Complete daily ADLs, including personal hygiene independently, as able  Description: Interventions:  - Observe, teach, and assist patient with ADLS  - Monitor and promote a balance of rest/activity, with adequate nutrition and elimination   Outcome: Progressing     Problem: Ineffective Coping  Goal: Cooperates with admission process  Description: Interventions:   - Complete admission process  Outcome: Progressing  Goal: Identifies ineffective coping skills  Outcome: Progressing  Goal: Identifies healthy coping skills  Outcome: Progressing  Goal: Demonstrates healthy coping skills  Outcome: Progressing  Goal: Participates in unit activities  Description: Interventions:  - Provide therapeutic environment   - Provide required programming   - Redirect inappropriate behaviors   Outcome: Progressing  Goal: Patient/Family participate in treatment and DC plans  Description: Interventions:  - Provide therapeutic environment  Outcome: Progressing  Goal: Patient/Family verbalizes awareness of resources  Outcome: Progressing  Goal: Understands least restrictive measures  Description: Interventions:  - Utilize least restrictive behavior  Outcome: Progressing  Goal: Free from restraint events  Description: - Utilize least restrictive measures   - Provide behavioral interventions   - Redirect inappropriate behaviors   Outcome: Progressing     Problem: Depression  Goal: Treatment Goal: Demonstrate behavioral control of depressive symptoms, verbalize feelings of improved mood/affect, and adopt new coping skills prior to discharge  Outcome: Progressing  Goal: Verbalize thoughts and feelings  Description: Interventions:  - Assess and re-assess patient's level of risk   - Engage patient in 1:1 interactions, daily, for a minimum of 15 minutes   - Encourage patient to express feelings, fears, frustrations, hopes   Outcome: Progressing  Goal: Refrain from harming self  Description: Interventions:  - Monitor patient closely, per order   - Supervise medication ingestion, monitor effects and side effects   Outcome: Progressing  Goal: Refrain from isolation  Description: Interventions:  - Develop a trusting relationship   - Encourage socialization   Outcome: Progressing  Goal: Refrain from self-neglect  Outcome: Progressing  Goal: Attend and participate in unit activities, including therapeutic, recreational, and educational groups  Description: Interventions:  - Provide therapeutic and educational activities daily, encourage attendance and participation, and document same in the medical record   Outcome: Progressing  Goal: Complete daily ADLs, including personal hygiene independently, as able  Description: Interventions:  - Observe, teach, and assist patient with ADLS  -  Monitor and promote a balance of rest/activity, with adequate nutrition and elimination   Outcome: Progressing     Problem: Anxiety  Goal: Anxiety is at manageable level  Description: Interventions:  - Assess and monitor patient's anxiety level  - Monitor for signs and symptoms (heart palpitations, chest pain, shortness of breath, headaches, nausea, feeling jumpy, restlessness, irritable, apprehensive)  - Collaborate with interdisciplinary team and initiate plan and interventions as ordered  - [place] patient to unit/surroundings  - Explain treatment plan  - Encourage participation in care  - Encourage verbalization of concerns/fears  - Identify coping mechanisms  - Assist in developing anxiety-reducing skills  - Administer/offer alternative therapies  - Limit or eliminate stimulants  Outcome: Progressing     Problem: Potential for Falls  Goal: Patient will remain free of falls  Description: INTERVENTIONS:  - Assess patient frequently for physical needs  -  Identify cognitive and physical deficits and behaviors that affect risk of falls    -  [place] fall precautions as indicated by assessment   - Educate patient/family on patient safety including physical limitations  - Instruct patient to call for assistance with activity based on assessment  - Modify environment to reduce risk of injury  - Consider OT/PT consult to assist with strengthening/mobility  Outcome: Progressing

## 2020-09-08 NOTE — NURSING NOTE
Patient is present in the dayroom and is out for meals  Pt is medication compliant and cooperative with care  Pt is pleasant on approach  Pt is social with select peers  Pt reported feeling "discouraged" because she was supposed to go home today  Pt under the assumption that she is here because her doctor changed  Pt counseled  Pt reported anxiety rated 2/4  Pt is currently denying all other s/s/ at this time  Will continue to monitor frequently

## 2020-09-08 NOTE — CASE MANAGEMENT
Pt's d/c will be postpone due to pt reporting VH last night  CM spoke with pt's daughter, Mikel Lopez, to give update  She is agreeable with new d/c plan   She reports that prior to admission, pt's VH was worse between 9pm-2am

## 2020-09-09 PROCEDURE — NC001 PR NO CHARGE: Performed by: PSYCHIATRY & NEUROLOGY

## 2020-09-09 RX ORDER — WARFARIN SODIUM 2 MG/1
TABLET ORAL
Qty: 50 TABLET | Refills: 0 | Status: SHIPPED | OUTPATIENT
Start: 2020-09-09 | End: 2020-10-16 | Stop reason: HOSPADM

## 2020-09-09 RX ADMIN — METOPROLOL TARTRATE 25 MG: 25 TABLET, FILM COATED ORAL at 21:36

## 2020-09-09 RX ADMIN — WARFARIN SODIUM 4 MG: 2 TABLET ORAL at 17:10

## 2020-09-09 RX ADMIN — GLIMEPIRIDE 2 MG: 2 TABLET ORAL at 09:15

## 2020-09-09 RX ADMIN — FUROSEMIDE 20 MG: 20 TABLET ORAL at 09:21

## 2020-09-09 RX ADMIN — POTASSIUM CHLORIDE 20 MEQ: 20 TABLET, EXTENDED RELEASE ORAL at 09:15

## 2020-09-09 RX ADMIN — FOLIC ACID 1 MG: 1 TABLET ORAL at 09:16

## 2020-09-09 RX ADMIN — FAMOTIDINE 20 MG: 20 TABLET ORAL at 09:15

## 2020-09-09 RX ADMIN — LEVOTHYROXINE SODIUM 100 MCG: 100 TABLET ORAL at 05:50

## 2020-09-09 RX ADMIN — FAMOTIDINE 20 MG: 20 TABLET ORAL at 17:10

## 2020-09-09 RX ADMIN — SERTRALINE HYDROCHLORIDE 50 MG: 50 TABLET ORAL at 09:15

## 2020-09-09 RX ADMIN — METOPROLOL TARTRATE 25 MG: 25 TABLET, FILM COATED ORAL at 09:21

## 2020-09-09 RX ADMIN — ASPIRIN 81 MG: 81 TABLET, COATED ORAL at 09:16

## 2020-09-09 RX ADMIN — MELATONIN TAB 3 MG 6 MG: 3 TAB at 21:36

## 2020-09-09 RX ADMIN — ACETAMINOPHEN 650 MG: 325 TABLET ORAL at 09:34

## 2020-09-09 RX ADMIN — ARIPIPRAZOLE 10 MG: 10 TABLET ORAL at 09:15

## 2020-09-09 NOTE — PROGRESS NOTES
Quick Note Omayra Silverio 1935, 82 y o  female MRN: [id number]  Unit/Bed#: [facility] 379-78 Encounter: [id number]  Primary Care Provider: No primary care provider on file  Date and time admitted to hospital: 8/31/2020  9:16 PM    Patient was not physically seen or examined however thorough chart review was performed including review of vitals and labs  Paroxysmal atrial fibrillation (HCC)  Assessment & Plan  · Anticoagulated on Coumadin  · Rate controlled on metoprolol  · INR is subtherapeutic (09/08/20) so Coumadin dose was increased  · Coumadin dosing on discharge:  · 4 mg every Monday, Tuesday, Wednesday, Thursday and Friday  · 2 mg every Saturday and Sunday  · Check PT/INR weekly

## 2020-09-09 NOTE — PLAN OF CARE
Problem: Alteration in Thoughts and Perception  Goal: Treatment Goal: Gain control of psychotic behaviors/thinking, reduce/eliminate presenting symptoms and demonstrate improved reality functioning upon discharge  Outcome: Progressing  Goal: Verbalize thoughts and feelings  Description: Interventions:  - Promote a nonjudgmental and trusting relationship with the patient through active listening and therapeutic communication  - Assess patient's level of functioning, behavior and potential for risk  - Engage patient in 1 on 1 interactions  - Encourage patient to express fears, feelings, frustrations, and discuss symptoms    - [place] patient to reality, help patient recognize reality-based thinking   - Administer medications as ordered and assess for potential side effects  - Provide the patient education related to the signs and symptoms of the illness and desired effects of prescribed medications  Outcome: Progressing  Goal: Refrain from acting on delusional thinking/internal stimuli  Description: Interventions:  - Monitor patient closely, per order   - Utilize least restrictive measures   - Set reasonable limits, give positive feedback for acceptable   - Administer medications as ordered and monitor of potential side effects  Outcome: Progressing  Goal: Agree to be compliant with medication regime, as prescribed and report medication side effects  Description: Interventions:  - Offer appropriate PRN medication and supervise ingestion; conduct AIMS, as needed   Outcome: Progressing  Goal: Attend and participate in unit activities, including therapeutic, recreational, and educational groups  Description: Interventions:  -Encourage Visitation and family involvement in care  Outcome: Progressing  Goal: Recognize dysfunctional thoughts, communicate reality-based thoughts at the time of discharge  Description: Interventions:  - Provide medication and psycho-education to assist patient in compliance and developing insight into his/her illness   Outcome: Progressing  Goal: Complete daily ADLs, including personal hygiene independently, as able  Description: Interventions:  - Observe, teach, and assist patient with ADLS  - Monitor and promote a balance of rest/activity, with adequate nutrition and elimination   Outcome: Progressing     Problem: Ineffective Coping  Goal: Cooperates with admission process  Description: Interventions:   - Complete admission process  Outcome: Progressing  Goal: Identifies ineffective coping skills  Outcome: Progressing  Goal: Identifies healthy coping skills  Outcome: Progressing  Goal: Demonstrates healthy coping skills  Outcome: Progressing  Goal: Participates in unit activities  Description: Interventions:  - Provide therapeutic environment   - Provide required programming   - Redirect inappropriate behaviors   Outcome: Progressing  Goal: Patient/Family participate in treatment and DC plans  Description: Interventions:  - Provide therapeutic environment  Outcome: Progressing  Goal: Patient/Family verbalizes awareness of resources  Outcome: Progressing  Goal: Understands least restrictive measures  Description: Interventions:  - Utilize least restrictive behavior  Outcome: Progressing  Goal: Free from restraint events  Description: - Utilize least restrictive measures   - Provide behavioral interventions   - Redirect inappropriate behaviors   Outcome: Progressing     Problem: Depression  Goal: Treatment Goal: Demonstrate behavioral control of depressive symptoms, verbalize feelings of improved mood/affect, and adopt new coping skills prior to discharge  Outcome: Progressing  Goal: Verbalize thoughts and feelings  Description: Interventions:  - Assess and re-assess patient's level of risk   - Engage patient in 1:1 interactions, daily, for a minimum of 15 minutes   - Encourage patient to express feelings, fears, frustrations, hopes   Outcome: Progressing  Goal: Refrain from harming self  Description: Interventions:  - Monitor patient closely, per order   - Supervise medication ingestion, monitor effects and side effects   Outcome: Progressing  Goal: Refrain from isolation  Description: Interventions:  - Develop a trusting relationship   - Encourage socialization   Outcome: Progressing  Goal: Refrain from self-neglect  Outcome: Progressing  Goal: Attend and participate in unit activities, including therapeutic, recreational, and educational groups  Description: Interventions:  - Provide therapeutic and educational activities daily, encourage attendance and participation, and document same in the medical record   Outcome: Progressing  Goal: Complete daily ADLs, including personal hygiene independently, as able  Description: Interventions:  - Observe, teach, and assist patient with ADLS  -  Monitor and promote a balance of rest/activity, with adequate nutrition and elimination   Outcome: Progressing     Problem: Anxiety  Goal: Anxiety is at manageable level  Description: Interventions:  - Assess and monitor patient's anxiety level  - Monitor for signs and symptoms (heart palpitations, chest pain, shortness of breath, headaches, nausea, feeling jumpy, restlessness, irritable, apprehensive)  - Collaborate with interdisciplinary team and initiate plan and interventions as ordered  - [place] patient to unit/surroundings  - Explain treatment plan  - Encourage participation in care  - Encourage verbalization of concerns/fears  - Identify coping mechanisms  - Assist in developing anxiety-reducing skills  - Administer/offer alternative therapies  - Limit or eliminate stimulants  Outcome: Progressing     Problem: Potential for Falls  Goal: Patient will remain free of falls  Description: INTERVENTIONS:  - Assess patient frequently for physical needs  -  Identify cognitive and physical deficits and behaviors that affect risk of falls    -  [place] fall precautions as indicated by assessment   - Educate patient/family on patient safety including physical limitations  - Instruct patient to call for assistance with activity based on assessment  - Modify environment to reduce risk of injury  - Consider OT/PT consult to assist with strengthening/mobility  Outcome: Progressing

## 2020-09-09 NOTE — PROGRESS NOTES
09/09/20 0932   Team Meeting   Meeting Type Daily Rounds   Initial Conference Date 09/09/20   Team Members Present   Team Members Present Nurse;   Physician Team Member Dr Baldo Vaca, [facility] Management Team Member Violetta   Patient/Family Present   Patient Present No   Patient's Family Present No     Denying all s/s including AVH, slept well, med compliant, anticipating d/c tomorrow

## 2020-09-09 NOTE — NURSING NOTE
Patient is pleasant and cooperative with the care  She is visible on the unit and brightens on approach  PRN Tylenol given for lower back pain  And was effective  Patient denies V/AH  She will be D/C tomorrow

## 2020-09-09 NOTE — PROGRESS NOTES
Pt attended afternoon group  Pt pleasant and cooperative  Bright affect  Pt able to interact with peers  09/09/20 1330   Activity/Group Checklist   Group Other (Comment)  (healthy communication and boundaries)   Attendance Attended   Attendance Duration (min) 46-60   Interactions Interacted appropriately   Affect/Mood Appropriate   Goals Achieved Identified feelings; Discussed coping strategies; Able to listen to others; Able to reflect/comment on own behavior

## 2020-09-09 NOTE — CASE MANAGEMENT
Spoke with pt's daughter, Jesica Chavez, to give update on anticipated d/c for tomorrow  Jordynkaty Chavez reports that she spoke with pt this morning and pt said "20 of them went to party last night at someone's house " Jesica Chavez is not sure if this was another VH or not as pt did not specify  Sarojnilaykaty Chavez wanted this info to pt communicated to the tx team as she's fearful that pt is not telling staff the truth about what she's experiencing  Jesica Chavez is agreeable with d/c tomorrow if recommended still  Pt's son will pick pt up tomorrow as Jesica Chavez is not available  Jesica Chavez is aware of aftercare plan and appts  CM will contact Aging Dept to recommend increase HHA hours   Jesica Chavez is also aware of bubble-packed meds that can be picked up at [facility]

## 2020-09-09 NOTE — PLAN OF CARE
Pt  In bed more this morning  Not attending groups as yesterday    Problem: Ineffective Coping  Goal: Participates in unit activities  Description: Interventions:  - Provide therapeutic environment   - Provide required programming   - Redirect inappropriate behaviors   Outcome: Not Progressing

## 2020-09-09 NOTE — NURSING NOTE
Patient is present in the dayroom and is out for meals  Pt is medication compliant and cooperative with care  Pt is pleasant on approach, social with select peers and staff  Pt noted to be talking to staff at shift change, stating she saw a little dog  Pt is currently denying all s/s at this time  Will continue to monitor frequently

## 2020-09-09 NOTE — ASSESSMENT & PLAN NOTE
· Anticoagulated on Coumadin  · Rate controlled on metoprolol  · INR is subtherapeutic (09/08/20) so Coumadin dose was increased  · Coumadin dosing on discharge:  · 4 mg every Monday, Tuesday, Wednesday, Thursday and Friday  · 2 mg every Saturday and Sunday  · Check PT/INR weekly

## 2020-09-09 NOTE — PROGRESS NOTES
Progress Note - Behavioral Health   Kathe Felty 82 y o  female MRN: [id number]  Unit/Bed#: [facility] 493-80 Encounter: [id number]    Assessment/Plan   Principal Problem:    Psychotic disorder ([place] [address] )  Active Problems:    Diabetes mellitus type 2, noninsulin dependent ([place] [address] )    Medical clearance for psychiatric admission    Acquired hypothyroidism    Coronary artery disease involving native coronary artery of native heart without angina pectoris    Paroxysmal atrial fibrillation ([facility])    Ischemic cardiomyopathy    Mild neurocognitive disorder    CKD (chronic kidney disease) stage 3, GFR 30-59 ml/min ([facility])      Recommended Treatment:   Continue current medications  Discharge planning; despite the patient's daughter's report conflicting with the patient's denial of recent hallucinations, neither account suggests continued presence of distressing hallucinations that would preclude discharged tomorrow if the patient remains stable   Continue with group therapy, milieu therapy and occuational therapy  Continue frequent safety checks and vitals per unit protocol  Case discussed with treatment team   Risks, benefits and possible side effects of Medications: Risks, benefits, and possible side effects of medications have been explained to the patient, who verbalizes understanding       ------------------------------------------------------------    Subjective: Per nursing report, Rip Bhatt has been interactive and cooperative on the unit  Today, Rip Bhatt was pleasant in interview, reporting good sleep overnight and no recent visual hallucinations  She states she feels ready to go home, as her daughter will be staying with her at least through the weekend  The patient's plan regarding what she would do if she has recurrence of her distressing visual hallucinations was discussed, and the patient agreed to contact her psychiatrist and family should this occur    Per case management, the patient reportedly told her daughter "21 of them went to party last night at someone's house," though the patient herself denies any hallucinations  Progress Toward Goals: slow improvement    Psychiatric Review of Systems:  Behavior over the last 24 hours: unchanged  Sleep: improved  Appetite: adequate  Medication side effects: none  ROS: Complete review of systems is negative except as noted above      Vital signs in last 24 hours:  Temp:  [97 3 °F (36 3 °C)-98 3 °F (36 8 °C)] 97 3 °F (36 3 °C)  HR:  [62-81] 78  Resp:  [16-18] 18  BP: (106-147)/(53-78) 143/78    Mental Status Evaluation:  Appearance:  alert, good eye contact, appears stated age, appropriate grooming and hygiene and overweight   Behavior:  calm, cooperative, sitting comfortably and no abnormal movements   Speech:  spontaneous, clear, normal rate, normal volume and coherent   Mood:  euthymic   Affect:  mood-congruent and brighter than previous days   Thought Process:  organized, goal directed and coherent, normal rate of thoughts   Thought Content: no verbalized delusions or overt paranoia, no obsessive thinking   Perceptual disturbances: no reported hallucinations and does not appear to be responding to internal stimuli at this time   Risk Potential: No active or passive suicidal or homicidal ideation was verbalized during interview, Low potential for aggression based on previous behavior   Cognition: normal abstract reasoning and age-appropriate attention span and concentration   Insight:  Limited   Judgment: Limited     Current Medications:  Current Facility-Administered Medications   Medication Dose Route Frequency Provider Last Rate    acetaminophen  650 mg Oral Q6H PRN Chapis Gilman MD      aluminum-magnesium hydroxide-simethicone  30 mL Oral Q4H PRN Chapis Gilman MD      ARIPiprazole  10 mg Oral Daily Mary Dumont MD      aspirin  81 mg Oral Daily Chapis Gilman MD      famotidine  20 mg Oral BID Chapis Gilman MD      folic acid  1 mg Oral Daily Chapis Gilman MD     [facility] furosemide  20 mg Oral Daily Law Alan MD      glimepiride  2 mg Oral Daily With Breakfast Law Alan MD      haloperidol  2 5 mg Oral Q6H PRN Law Alan MD      haloperidol lactate  2 5 mg Intramuscular Q1H PRN Law Alan MD      levothyroxine  100 mcg Oral Early Morning Law Alan MD      melatonin  6 mg Oral HS Inez Prieto MD      metoprolol tartrate  25 mg Oral Q12H [address] Law Alan MD      polyethylene glycol  17 g Oral Daily PRN Law Alan MD      potassium chloride  20 mEq Oral Daily Law Alan MD      sertraline  50 mg Oral Daily Law Alan MD      warfarin  4 mg Oral Daily (warfarin) Nadir Gomez MD         Behavioral Health Medications: all current active meds have been reviewed  Changes as in plan section above  Laboratory results:  I have personally reviewed all pertinent laboratory/tests results  Recent Results (from the past 50 hour(s))   Protime-INR    Collection Time: 09/08/20  5:40 AM   Result Value Ref Range    Protime 18 0 (H) 11 6 - 14 5 seconds    INR 1 48 (H) 0 84 - 1 19        This note has been constructed using a voice recognition system  There may be translation, syntax, or grammatical errors  If you have any questions, please contact the dictating provider

## 2020-09-10 VITALS
WEIGHT: 194.89 LBS | BODY MASS INDEX: 38.26 KG/M2 | OXYGEN SATURATION: 98 % | TEMPERATURE: 97.6 F | DIASTOLIC BLOOD PRESSURE: 79 MMHG | HEIGHT: 60 IN | HEART RATE: 73 BPM | RESPIRATION RATE: 16 BRPM | SYSTOLIC BLOOD PRESSURE: 137 MMHG

## 2020-09-10 PROCEDURE — 99238 HOSP IP/OBS DSCHRG MGMT 30/<: CPT | Performed by: PSYCHIATRY & NEUROLOGY

## 2020-09-10 RX ADMIN — ARIPIPRAZOLE 10 MG: 10 TABLET ORAL at 08:23

## 2020-09-10 RX ADMIN — FAMOTIDINE 20 MG: 20 TABLET ORAL at 08:22

## 2020-09-10 RX ADMIN — LEVOTHYROXINE SODIUM 100 MCG: 100 TABLET ORAL at 06:19

## 2020-09-10 RX ADMIN — POTASSIUM CHLORIDE 20 MEQ: 20 TABLET, EXTENDED RELEASE ORAL at 08:24

## 2020-09-10 RX ADMIN — FUROSEMIDE 20 MG: 20 TABLET ORAL at 08:22

## 2020-09-10 RX ADMIN — SERTRALINE HYDROCHLORIDE 50 MG: 50 TABLET ORAL at 08:23

## 2020-09-10 RX ADMIN — GLIMEPIRIDE 2 MG: 2 TABLET ORAL at 08:23

## 2020-09-10 RX ADMIN — ASPIRIN 81 MG: 81 TABLET, COATED ORAL at 08:23

## 2020-09-10 RX ADMIN — FOLIC ACID 1 MG: 1 TABLET ORAL at 08:23

## 2020-09-10 RX ADMIN — METOPROLOL TARTRATE 25 MG: 25 TABLET, FILM COATED ORAL at 08:23

## 2020-09-10 NOTE — NURSING NOTE
States she slept well  Monitored on safety checks  Denies SI's, depression or anxiety  Denies /  States she's ready to go home

## 2020-09-10 NOTE — DISCHARGE SUMMARY
Discharge Summary - [address] [facility] 82 y o  female MRN: [id number]  Unit/Bed#: Blade Banerjee 116-17 Encounter: [id number]     Admission Date: 8/31/2020         Discharge Date:  September 10, 2020    Attending Psychiatrist: Marcia Carter MD    Reason for Admission/HPI:   The following was copied and pasted from initial evaluation done by Dr Idania Gilman   September 1, 2020  82 y o  female with no limited prior psychiatric treatment, who was bib her daughter due to worsening distress and paranoia due to new visual hallucinations that started about 4 months ago  She was worked up for altered mental status in internal medicine at [address] [place] [address] when it started  CTH, MRI w/o contrast showed no acute processes  B12 was deficient (210) but TSH, folate wnl  She was started on sertraline 25mg po daily for depressed mood noted at the time and had a 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  An antipsychotic was not recommended at the time  Patient recalls initially that there was a strange woman in her apartment and that the person was lost  When patient told her she was in the wrong apartment, the person appeared to leave  However, following hallucinations did not resolve so easily  She later counted 31 people having a party in her apartment and would not leave  More recently, patient's daughter noted patient was becoming more paranoid and was having concerns that the hallucinations would harm her, including fears that she someone would set her house on fire  She was recently admitted at a [facility] facility from 8/23/20-8/27/20 but left prematurely due to concerns about her daughter being hospitalized with a medical illness  Denies SI/HI  Does endorse depressed mood due to a combination of difficulty due to COVID restrictions as well as recent psychosis onset   Denies having had any problems with memory         Meds/Allergies     all current active meds have been reviewed    Allergies   Allergen Reactions    Shellfish-Derived Products Anaphylaxis    Adhesive [Medical Tape]     Amoxicillin     Ancef [Cefazolin]     Cephalosporins     Dofetilide      Cannot take generic    Epinephrine     Erythromycin     Iodine     Levofloxacin     Losartan     Morphine     Other      seafood    Oxycodone     Penicillins     Percocet [Oxycodone-Acetaminophen]     Pineapple     Thorazine [Chlorpromazine]        Objective     Vital signs in last 24 hours:  Temp:  [97 6 °F (36 4 °C)-97 9 °F (36 6 °C)] 97 6 °F (36 4 °C)  HR:  [72-75] 73  Resp:  [15-16] 16  BP: (124-152)/(58-79) 137/79      Intake/Output Summary (Last 24 hours) at 9/10/2020 1351  Last data filed at 9/10/2020 1203  Gross per 24 hour   Intake 840 ml   Output    Net 840 ml       Hospital Course: The patient was admitted to the inpatient psychiatric unit and started on every 15 minutes precautions  A treatment plan was formed with focus on pharmacotherapy and milieu therapy, group therapy and individual psychotherapy when indicated  Psychiatric medications were titrated over the hospital stay and milieu therapy was utilized  To address visual hallucinations and anxiety, the patient was started on antidepressant Zoloft and antipsychotic medication Abilify  Medication doses were titrated during the hospital course  Patient's symptoms improved gradually over the hospital course  At the end of treatment the patient was doing well  Mood was stable at the time of discharge  The patient denied suicidal ideation, intent or plan at the time of discharge and denied homicidal ideation, intent or plan at the time of discharge  There was no overt psychosis at the time of discharge  Sleep and appetite were improved  The patient was tolerating medications and was not reporting any significant side effects at the time of discharge      Since the patient was doing well at the end of the hospitalization, treatment team felt that the patient had maximally benefitted from inpatient treatment and could be safely discharged to outpatient care  The outpatient follow up with Primary care physician was arranged by the unit  upon discharge      Mental Status at Time of Discharge:   Appearance:  Adequate hygiene and grooming and Good eye contact   Behavior:  calm, cooperative and friendly   Speech:   Language: Normal rate and Normal volume  No overt abnormality   Mood:  euthymic   Affect:   Associations: appropriate  Tightly connected   Thought Process:  Goal directed and coherent   Thought Content:  Does not verbalize delusional material   Perceptual Disturbances: Visual hallucinations     Risk Potential: No suicidal or homicidal ideation   Orientation   Language Disoriented to time and place  anomia No   Memory  Fund of knowledge Short term impaired  Diminished   Attention/Concentration attention span and concentration were age appropriate   Insight:  limited   Judgment: Good judgment   Gait/Station: normal gait/station and normal balance   Motor Activity: No abnormal movement noted       Admission Diagnosis:  Principal Problem:    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[facility] )  Active Problems:    Diabetes mellitus type 2, noninsulin dependent ([facility] )    Medical clearance for psychiatric admission    Acquired hypothyroidism    Coronary artery disease involving native coronary artery of native heart without angina pectoris    Paroxysmal atrial fibrillation ([facility])    Ischemic cardiomyopathy    Mild neurocognitive disorder    CKD (chronic kidney disease) stage 3, GFR 30-59 ml/min ([facility])      Discharge Diagnosis:     Principal Problem:    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)  Resolved Problems:    * No resolved hospital problems  *      Lab results:    Admission on 08/31/2020   Component Date Value    Color, UA 09/01/2020 Straw     Clarity, UA 09/01/2020 Slightly Cloudy*    Specific Gravity, UA 09/01/2020 1 010     pH, UA 09/01/2020 6 0     Leukocytes, UA 09/01/2020 100 0*    Nitrite, UA 09/01/2020 Negative     Protein, UA 09/01/2020 Negative     Glucose, UA 09/01/2020 Negative     Ketones, UA 09/01/2020 Negative     Bilirubin, UA 09/01/2020 Negative     Blood, UA 09/01/2020 Negative     UROBILINOGEN UA 09/01/2020 Negative     Protime 09/01/2020 32 0*    INR 09/01/2020 3 11*    RBC, UA 09/01/2020 None Seen     WBC, UA 09/01/2020 4-10*    Epithelial Cells 09/01/2020 Occasional     Bacteria, UA 09/01/2020 Innumerable*    Protime 09/02/2020 29 6*    INR 09/02/2020 2 81*    Protime 09/08/2020 18 0*    INR 09/08/2020 1 48*       Discharge Medications:    See after visit summary for reconciled discharge medications provided to patient and family  Discharge instructions/Information to patient and family:     See after visit summary for information provided to patient and family  Provisions for Follow-Up Care:    See after visit summary for information related to follow-up care and any pertinent home health orders  Discharge Statement     I spent 30 minutes discharging the patient  This time was spent on the day of discharge  I had direct contact with the patient on the day of discharge  Additional documentation is required if more than 30 minutes were spent on discharge:    I reviewed with Azucena Amezcua importance of compliance with medications and outpatient treatment after discharge  I discussed the medication regimen and possible side effects of the medications with Azucena Amezcua prior to discharge   At the time of discharge she was tolerating psychiatric medications  I discussed outpatient follow up with Mayur Garza  I reviewed with Mayur Garza crisis plan and safety plan upon discharge

## 2020-09-10 NOTE — PROGRESS NOTES
09/10/20 1139   Team Meeting   Meeting Type Daily Rounds   Initial Conference Date 09/10/20   Team Members Present   Team Members Present Physician;Nurse;   Physician Team Member Dr Blaze Marino, [address] [place] [address] Team Member Violetta   Patient/Family Present   Patient Present No   Patient's Family Present No     Discharge today, slept well, visible, social, med compliant

## 2020-09-10 NOTE — NURSING NOTE
Patient is for discharge to home today   Her son is coming pick her up   Patients belongings were picked up and her pills from pharmacy  AVS was reviewed with the patient and nurse will review her medication list with the son also  Few copies made for the family

## 2020-09-10 NOTE — SOCIAL WORK
Met with pt 1:1 to complete BH relapse prevention plan  Pt was cooperative and hopeful for d/c  Pt noted signs and symptoms as seeing people that weren't there prior to admission  Crisis and warmline phone numbers provided  Pt signed and copy in chart

## 2020-09-10 NOTE — BH TRANSITION RECORD
Contact Information: If you have any questions, concerns, pended studies, tests and/or procedures, or emergencies regarding your inpatient behavioral health visit  Please contact [facility] older adult behavioral health unit [phone] and ask to speak to a , nurse or physician  A contact is available 24 hours/ 7 days a week at this number  Summary of Procedures Performed During your Stay:  Below is a list of major procedures performed during your hospital stay and a summary of results:  - No major procedures performed  Pending Studies (From admission, onward)     Start     Ordered    09/11/20 0000  Protime-INR  Once      09/08/20 1151              If studies are pending at discharge, follow up with your PCP and/or referring provider

## 2020-09-10 NOTE — DISCHARGE INSTRUCTIONS
Aripiprazole (By mouth)   Aripiprazole (ar-i-PIP-ra-zole)  Treats schizophrenia, bipolar disorder, depression, and Tourette syndrome  Also treats irritability associated with autism  Brand Name(s): Abilify   There may be other brand names for this medicine  When This Medicine Should Not Be Used: This medicine is not right for everyone  Do not use it if you had an allergic reaction to aripiprazole  How to Use This Medicine:   Liquid, Tablet, Dissolving Tablet  · Take your medicine as directed  Your dose may need to be changed several times to find what works best for you  · Tablet: Swallow whole  Do not break, crush, or chew it  · Disintegrating tablet: Make sure your hands are dry before you handle the disintegrating tablet  Peel back the foil from the blister pack, then remove the tablet  Do not push the tablet through the foil  Place the tablet in your mouth  After it has melted, swallow or take a drink of water  · This medicine should come with a Medication Guide  Ask your pharmacist for a copy if you do not have one  · Missed dose: Take a dose as soon as you remember  If it is almost time for your next dose, wait until then and take a regular dose  Do not take extra medicine to make up for a missed dose  · Store the medicine in a closed container at room temperature, away from heat, moisture, and direct light  Drugs and Foods to Avoid:   Ask your doctor or pharmacist before using any other medicine, including over-the-counter medicines, vitamins, and herbal products  · Some medicines can affect how aripiprazole works   Tell your doctor if you are using any of the following:   ¨ Carbamazepine, clarithromycin, fluoxetine, itraconazole, ketoconazole, paroxetine, quinidine, or rifampin  ¨ Benzodiazepine or sedative medicine (including lorazepam)  ¨ Blood pressure medicine  Warnings While Using This Medicine:   · Tell your doctor if you are pregnant or breastfeeding, or if you have diabetes, heart failure, heart or blood vessel disease, heart rhythm problems, high or low blood pressure, high cholesterol, or a history of seizures, heart attack or stroke  · For some children, teenagers, and young adults, this medicine may increase mental or emotional problems  This may lead to thoughts of suicide and violence  Talk with your doctor right away if you have any thoughts or behavior changes that concern you  Tell your doctor if you or anyone in your family has a history of bipolar disorder or suicide attempts  · This medicine may cause the following problems:   ¨ Neuroleptic malignant syndrome (NMS), a neurologic disorder than can be life-threatening  ¨ Tardive dyskinesia (muscle movements you cannot control)  ¨ Changes in blood sugar levels  ¨ Unusual changes in behavior, such as gambling urges, binge or compulsive eating, or compulsive shopping, or sexual urges  · This medicine may make you dizzy or drowsy, or may cause trouble with thinking or controlling body movements, which may lead to falls, fractures or other injuries  Do not drive or do anything that could be dangerous until you know how this medicine affects you  Stand or sit up slowly if you feel lightheaded or dizzy  · You may get overheated more easily while you are using this medicine  Be careful when you exercise or you are outside in hot or humid weather  Drink plenty of water to stay hydrated  · Tell your doctor if you have phenylketonuria (PKU)  The disintegrating tablet contains phenylalanine  · Do not stop using this medicine suddenly  Your doctor will need to slowly decrease your dose before you stop it completely  · Your doctor will do lab tests at regular visits to check on the effects of this medicine  Keep all appointments  · Keep all medicine out of the reach of children  Never share your medicine with anyone    Possible Side Effects While Using This Medicine:   Call your doctor right away if you notice any of these side effects:  · Allergic reaction: Itching or hives, swelling in your face or hands, swelling or tingling in your mouth or throat, chest tightness, trouble breathing  · Anxiety, irritability, nervousness, restlessness, or trouble sleeping  · Compulsive behavior or intense urges you cannot control  · Confusion, unusual behavior, depressed mood, or thoughts of hurting yourself or others  · Fever, chills, cough, sore throat, body aches  · Increased hunger or thirst, change in how much or how often you urinate  · Jerky muscle movements you cannot control (often in your face, tongue, or jaw)  · Lightheadedness, dizziness, fainting  · Seizures  · Sweating, uneven heartbeat, or muscle stiffness  · Unusual tiredness or sleepiness  If you notice these less serious side effects, talk with your doctor:   · Headache  · Nausea, vomiting, drooling  · Unusual weight gain  If you notice other side effects that you think are caused by this medicine, tell your doctor  Call your doctor for medical advice about side effects  You may report side effects to FDA at 6-850-FDA-5119  © 2017 [address] [employer] Information is for End User's use only and may not be sold, redistributed or otherwise used for commercial purposes  The above information is an  only  It is not intended as medical advice for individual conditions or treatments  Talk to your doctor, nurse or pharmacist before following any medical regimen to see if it is safe and effective for you

## 2020-09-19 ENCOUNTER — APPOINTMENT (EMERGENCY)
Dept: CT IMAGING | Facility: HOSPITAL | Age: 85
DRG: 071 | End: 2020-09-19
Payer: COMMERCIAL

## 2020-09-19 ENCOUNTER — HOSPITAL ENCOUNTER (INPATIENT)
Facility: HOSPITAL | Age: 85
LOS: 1 days | DRG: 071 | End: 2020-09-21
Attending: EMERGENCY MEDICINE | Admitting: INTERNAL MEDICINE
Payer: COMMERCIAL

## 2020-09-19 DIAGNOSIS — G89.29 CHRONIC BACK PAIN: ICD-10-CM

## 2020-09-19 DIAGNOSIS — F02.81 DEMENTIA IN CONDITIONS CLASSIFIED ELSEWHERE WITH WANDERING OFF (HCC): ICD-10-CM

## 2020-09-19 DIAGNOSIS — F03.90 DEMENTIA (HCC): Primary | ICD-10-CM

## 2020-09-19 DIAGNOSIS — Z91.83 DEMENTIA IN CONDITIONS CLASSIFIED ELSEWHERE WITH WANDERING OFF (HCC): ICD-10-CM

## 2020-09-19 DIAGNOSIS — R44.3 HALLUCINATIONS: ICD-10-CM

## 2020-09-19 DIAGNOSIS — M54.9 CHRONIC BACK PAIN: ICD-10-CM

## 2020-09-19 PROBLEM — E87.6 HYPOKALEMIA: Status: ACTIVE | Noted: 2020-09-19

## 2020-09-19 PROBLEM — G93.40 ACUTE ENCEPHALOPATHY: Status: ACTIVE | Noted: 2020-09-19

## 2020-09-19 LAB
ANION GAP SERPL CALCULATED.3IONS-SCNC: 4 MMOL/L (ref 4–13)
BACTERIA UR QL AUTO: ABNORMAL /HPF
BASOPHILS # BLD AUTO: 0.03 THOUSANDS/ΜL (ref 0–0.1)
BASOPHILS NFR BLD AUTO: 0 % (ref 0–1)
BILIRUB UR QL STRIP: NEGATIVE
BUN SERPL-MCNC: 14 MG/DL (ref 5–25)
CALCIUM SERPL-MCNC: 9.6 MG/DL (ref 8.3–10.1)
CHLORIDE SERPL-SCNC: 103 MMOL/L (ref 100–108)
CLARITY UR: ABNORMAL
CO2 SERPL-SCNC: 32 MMOL/L (ref 21–32)
COLOR UR: YELLOW
COLOR, POC: YELLOW
CREAT SERPL-MCNC: 1.41 MG/DL (ref 0.6–1.3)
EOSINOPHIL # BLD AUTO: 0.09 THOUSAND/ΜL (ref 0–0.61)
EOSINOPHIL NFR BLD AUTO: 1 % (ref 0–6)
ERYTHROCYTE [DISTWIDTH] IN BLOOD BY AUTOMATED COUNT: 13 % (ref 11.6–15.1)
GFR SERPL CREATININE-BSD FRML MDRD: 34 ML/MIN/1.73SQ M
GLUCOSE SERPL-MCNC: 111 MG/DL (ref 65–140)
GLUCOSE UR STRIP-MCNC: NEGATIVE MG/DL
HCT VFR BLD AUTO: 37 % (ref 34.8–46.1)
HGB BLD-MCNC: 11.7 G/DL (ref 11.5–15.4)
HGB UR QL STRIP.AUTO: NEGATIVE
IMM GRANULOCYTES # BLD AUTO: 0.04 THOUSAND/UL (ref 0–0.2)
IMM GRANULOCYTES NFR BLD AUTO: 1 % (ref 0–2)
INR PPP: 1.57 (ref 0.84–1.19)
KETONES UR STRIP-MCNC: NEGATIVE MG/DL
LEUKOCYTE ESTERASE UR QL STRIP: NEGATIVE
LYMPHOCYTES # BLD AUTO: 0.85 THOUSANDS/ΜL (ref 0.6–4.47)
LYMPHOCYTES NFR BLD AUTO: 12 % (ref 14–44)
MCH RBC QN AUTO: 32.1 PG (ref 26.8–34.3)
MCHC RBC AUTO-ENTMCNC: 31.6 G/DL (ref 31.4–37.4)
MCV RBC AUTO: 101 FL (ref 82–98)
MONOCYTES # BLD AUTO: 0.44 THOUSAND/ΜL (ref 0.17–1.22)
MONOCYTES NFR BLD AUTO: 6 % (ref 4–12)
NEUTROPHILS # BLD AUTO: 5.67 THOUSANDS/ΜL (ref 1.85–7.62)
NEUTS SEG NFR BLD AUTO: 80 % (ref 43–75)
NITRITE UR QL STRIP: POSITIVE
NON-SQ EPI CELLS URNS QL MICRO: ABNORMAL /HPF
NRBC BLD AUTO-RTO: 0 /100 WBCS
PH UR STRIP.AUTO: 5.5 [PH] (ref 4.5–8)
PLATELET # BLD AUTO: 187 THOUSANDS/UL (ref 149–390)
PMV BLD AUTO: 9.8 FL (ref 8.9–12.7)
POTASSIUM SERPL-SCNC: 3.2 MMOL/L (ref 3.5–5.3)
PROT UR STRIP-MCNC: NEGATIVE MG/DL
PROTHROMBIN TIME: 18.4 SECONDS (ref 11.6–14.5)
RBC # BLD AUTO: 3.65 MILLION/UL (ref 3.81–5.12)
RBC #/AREA URNS AUTO: ABNORMAL /HPF
SODIUM SERPL-SCNC: 139 MMOL/L (ref 136–145)
SP GR UR STRIP.AUTO: 1.01 (ref 1–1.03)
TSH SERPL DL<=0.05 MIU/L-ACNC: 1.72 UIU/ML (ref 0.36–3.74)
UROBILINOGEN UR QL STRIP.AUTO: 0.2 E.U./DL
WBC # BLD AUTO: 7.12 THOUSAND/UL (ref 4.31–10.16)
WBC #/AREA URNS AUTO: ABNORMAL /HPF

## 2020-09-19 PROCEDURE — G1004 CDSM NDSC: HCPCS

## 2020-09-19 PROCEDURE — 87086 URINE CULTURE/COLONY COUNT: CPT | Performed by: EMERGENCY MEDICINE

## 2020-09-19 PROCEDURE — 36415 COLL VENOUS BLD VENIPUNCTURE: CPT | Performed by: EMERGENCY MEDICINE

## 2020-09-19 PROCEDURE — 87186 SC STD MICRODIL/AGAR DIL: CPT | Performed by: EMERGENCY MEDICINE

## 2020-09-19 PROCEDURE — 99285 EMERGENCY DEPT VISIT HI MDM: CPT | Performed by: EMERGENCY MEDICINE

## 2020-09-19 PROCEDURE — 70450 CT HEAD/BRAIN W/O DYE: CPT

## 2020-09-19 PROCEDURE — 99223 1ST HOSP IP/OBS HIGH 75: CPT | Performed by: NURSE PRACTITIONER

## 2020-09-19 PROCEDURE — 87077 CULTURE AEROBIC IDENTIFY: CPT | Performed by: EMERGENCY MEDICINE

## 2020-09-19 PROCEDURE — 84443 ASSAY THYROID STIM HORMONE: CPT | Performed by: EMERGENCY MEDICINE

## 2020-09-19 PROCEDURE — 81001 URINALYSIS AUTO W/SCOPE: CPT

## 2020-09-19 PROCEDURE — 85610 PROTHROMBIN TIME: CPT | Performed by: EMERGENCY MEDICINE

## 2020-09-19 PROCEDURE — 99285 EMERGENCY DEPT VISIT HI MDM: CPT

## 2020-09-19 PROCEDURE — 80048 BASIC METABOLIC PNL TOTAL CA: CPT | Performed by: EMERGENCY MEDICINE

## 2020-09-19 PROCEDURE — 85025 COMPLETE CBC W/AUTO DIFF WBC: CPT | Performed by: EMERGENCY MEDICINE

## 2020-09-19 PROCEDURE — 81003 URINALYSIS AUTO W/O SCOPE: CPT

## 2020-09-19 RX ORDER — ACETAMINOPHEN 325 MG/1
975 TABLET ORAL ONCE
Status: COMPLETED | OUTPATIENT
Start: 2020-09-19 | End: 2020-09-19

## 2020-09-19 RX ADMIN — ACETAMINOPHEN 975 MG: 325 TABLET ORAL at 22:29

## 2020-09-20 LAB
GLUCOSE SERPL-MCNC: 110 MG/DL (ref 65–140)
GLUCOSE SERPL-MCNC: 116 MG/DL (ref 65–140)
GLUCOSE SERPL-MCNC: 64 MG/DL (ref 65–140)
GLUCOSE SERPL-MCNC: 72 MG/DL (ref 65–140)
GLUCOSE SERPL-MCNC: 78 MG/DL (ref 65–140)
GLUCOSE SERPL-MCNC: 87 MG/DL (ref 65–140)
INR PPP: 1.59 (ref 0.84–1.19)
PROTHROMBIN TIME: 18.7 SECONDS (ref 11.6–14.5)

## 2020-09-20 PROCEDURE — 82948 REAGENT STRIP/BLOOD GLUCOSE: CPT

## 2020-09-20 PROCEDURE — 99232 SBSQ HOSP IP/OBS MODERATE 35: CPT | Performed by: INTERNAL MEDICINE

## 2020-09-20 PROCEDURE — 85610 PROTHROMBIN TIME: CPT | Performed by: NURSE PRACTITIONER

## 2020-09-20 RX ORDER — FUROSEMIDE 20 MG/1
20 TABLET ORAL DAILY
Status: DISCONTINUED | OUTPATIENT
Start: 2020-09-20 | End: 2020-09-21 | Stop reason: HOSPADM

## 2020-09-20 RX ORDER — NITROGLYCERIN 0.4 MG/1
0.4 TABLET SUBLINGUAL
Status: DISCONTINUED | OUTPATIENT
Start: 2020-09-20 | End: 2020-09-21 | Stop reason: HOSPADM

## 2020-09-20 RX ORDER — LEVOTHYROXINE SODIUM 0.1 MG/1
100 TABLET ORAL
Status: DISCONTINUED | OUTPATIENT
Start: 2020-09-20 | End: 2020-09-21 | Stop reason: HOSPADM

## 2020-09-20 RX ORDER — POTASSIUM CHLORIDE 20 MEQ/1
20 TABLET, EXTENDED RELEASE ORAL DAILY
Status: DISCONTINUED | OUTPATIENT
Start: 2020-09-20 | End: 2020-09-21 | Stop reason: HOSPADM

## 2020-09-20 RX ORDER — ACETAMINOPHEN 325 MG/1
650 TABLET ORAL EVERY 6 HOURS PRN
Status: DISCONTINUED | OUTPATIENT
Start: 2020-09-20 | End: 2020-09-21 | Stop reason: HOSPADM

## 2020-09-20 RX ORDER — WARFARIN SODIUM 5 MG/1
5 TABLET ORAL
Status: DISCONTINUED | OUTPATIENT
Start: 2020-09-20 | End: 2020-09-21

## 2020-09-20 RX ORDER — FOLIC ACID 1 MG/1
1 TABLET ORAL DAILY
Status: DISCONTINUED | OUTPATIENT
Start: 2020-09-20 | End: 2020-09-21 | Stop reason: HOSPADM

## 2020-09-20 RX ORDER — WARFARIN SODIUM 2 MG/1
2 TABLET ORAL
Status: DISCONTINUED | OUTPATIENT
Start: 2020-09-20 | End: 2020-09-20 | Stop reason: DRUGHIGH

## 2020-09-20 RX ORDER — ONDANSETRON 2 MG/ML
4 INJECTION INTRAMUSCULAR; INTRAVENOUS EVERY 6 HOURS PRN
Status: DISCONTINUED | OUTPATIENT
Start: 2020-09-20 | End: 2020-09-21 | Stop reason: HOSPADM

## 2020-09-20 RX ORDER — WARFARIN SODIUM 4 MG/1
4 TABLET ORAL
Status: DISCONTINUED | OUTPATIENT
Start: 2020-09-21 | End: 2020-09-20

## 2020-09-20 RX ORDER — ASPIRIN 81 MG/1
81 TABLET ORAL DAILY
Status: DISCONTINUED | OUTPATIENT
Start: 2020-09-20 | End: 2020-09-21 | Stop reason: HOSPADM

## 2020-09-20 RX ORDER — ARIPIPRAZOLE 10 MG/1
10 TABLET ORAL DAILY
Status: DISCONTINUED | OUTPATIENT
Start: 2020-09-20 | End: 2020-09-21 | Stop reason: HOSPADM

## 2020-09-20 RX ORDER — LANOLIN ALCOHOL/MO/W.PET/CERES
6 CREAM (GRAM) TOPICAL
Status: DISCONTINUED | OUTPATIENT
Start: 2020-09-20 | End: 2020-09-21 | Stop reason: HOSPADM

## 2020-09-20 RX ORDER — POTASSIUM CHLORIDE 20 MEQ/1
20 TABLET, EXTENDED RELEASE ORAL ONCE
Status: COMPLETED | OUTPATIENT
Start: 2020-09-20 | End: 2020-09-20

## 2020-09-20 RX ORDER — FAMOTIDINE 20 MG/1
10 TABLET, FILM COATED ORAL DAILY
Status: DISCONTINUED | OUTPATIENT
Start: 2020-09-20 | End: 2020-09-21 | Stop reason: HOSPADM

## 2020-09-20 RX ADMIN — FAMOTIDINE 10 MG: 20 TABLET ORAL at 08:55

## 2020-09-20 RX ADMIN — LEVOTHYROXINE SODIUM 100 MCG: 100 TABLET ORAL at 05:04

## 2020-09-20 RX ADMIN — POTASSIUM CHLORIDE 20 MEQ: 1500 TABLET, EXTENDED RELEASE ORAL at 08:57

## 2020-09-20 RX ADMIN — WARFARIN SODIUM 5 MG: 5 TABLET ORAL at 17:05

## 2020-09-20 RX ADMIN — MELATONIN 6 MG: at 01:49

## 2020-09-20 RX ADMIN — METOPROLOL TARTRATE 25 MG: 25 TABLET, FILM COATED ORAL at 01:49

## 2020-09-20 RX ADMIN — MELATONIN 6 MG: at 22:31

## 2020-09-20 RX ADMIN — CYANOCOBALAMIN TAB 500 MCG 1000 MCG: 500 TAB at 08:57

## 2020-09-20 RX ADMIN — POTASSIUM CHLORIDE 20 MEQ: 1500 TABLET, EXTENDED RELEASE ORAL at 01:49

## 2020-09-20 RX ADMIN — FOLIC ACID 1 MG: 1 TABLET ORAL at 08:57

## 2020-09-20 RX ADMIN — SERTRALINE HYDROCHLORIDE 50 MG: 50 TABLET ORAL at 08:57

## 2020-09-20 RX ADMIN — ASPIRIN 81 MG: 81 TABLET, COATED ORAL at 08:57

## 2020-09-20 RX ADMIN — ARIPIPRAZOLE 10 MG: 10 TABLET ORAL at 08:55

## 2020-09-20 NOTE — ASSESSMENT & PLAN NOTE
· H/o psychosis with paranoia and visual hallucinations; unsafe to be at home alone  Recent Behavioral Health admissions at [facility] and [facility]  Outpatient regimen Zoloft 50mg and Abilify 10mg  · CT Head negative for acute intracranial hemorrhage, midline shift, or mass effect  · Rule out urinary tract infection: UA yellow and cloudy with moderate bacteria, positive nitrites  · Afebrile without leukocytosis, denies dysuria   Will hold antibiotics pending result of urine culture  · Follow-up urine culture  · Case management consult for placement

## 2020-09-20 NOTE — H&P
H&P- Quique Shelter 1935, 82 y o  female MRN: [id number]    Unit/Bed#: [facility] 2 -01 Encounter: [id number]    Primary Care Provider: No primary care provider on file  Date and time admitted to hospital: 9/19/2020  9:27 PM      * Acute encephalopathy  Assessment & Plan  · H/o psychosis with paranoia and visual hallucinations; unsafe to be at home alone  Recent Behavioral Health admissions at [address] [place] [address]  Outpatient regimen Zoloft 50mg and Abilify 10mg  · CT Head negative for acute intracranial hemorrhage, midline shift, or mass effect  · Rule out urinary tract infection: UA yellow and cloudy with moderate bacteria, positive nitrites  · Afebrile without leukocytosis, denies dysuria   Will hold antibiotics pending result of urine culture  · Follow-up urine culture  · Case management consult for placement    CKD (chronic kidney disease) stage 3, GFR 30-59 ml/min ([facility])  Assessment & Plan  · Creatinine at baseline  · Avoid nephrotoxins  · Monitor BMP    Diabetes mellitus type 2, noninsulin dependent (HCC)  Assessment & Plan    Lab Results   Component Value Date    HGBA1C 6 2 (H) 07/16/2020   · Will hold oral hypoglycemics and treat with sliding scale insulin  · ADA diet    Hypokalemia  Assessment & Plan  K 3 2, replete, monitor serum K    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[facility] [address] )  Assessment & Plan  · H/o anxiety with paranoia and visual hallucinations maintained on Abilify and Zoloft    Ischemic cardiomyopathy  Assessment & Plan  LVEF 45%; on metoprolol    Paroxysmal atrial fibrillation (HCC)  Assessment & Plan  · AFib anticoagulated on warfarin, rate controlled with metoprolol  · INR subtherapeutic 1 5, cannot verify medication compliance  · Monitor INR    Coronary artery disease involving native coronary artery of native heart without angina pectoris  Assessment & Plan  · CAD s/p CABG on ASA and beta-blocker     Acquired hypothyroidism  Assessment & Plan  On levothyroxine 100mcg      VTE Prophylaxis: Warfarin (Coumadin)  / reason for no mechanical VTE prophylaxis ac   Code Status: FC  POLST: POLST is not applicable to this patient  Discussion with family:     Anticipated Length of Stay:  Patient will be admitted on an Inpatient basis with an anticipated length of stay of  > 2 midnights  Justification for Hospital Stay:  Acute encephalopathy    Total Time for Visit, including Counseling / Coordination of Care: 1 hour  Greater than 50% of this total time spent on direct patient counseling and coordination of care  Chief Complaint:   "Tired of all the people in my house"    History of Present Illness:    Samanta Berrios is a 82 y o  female who presents with c/o visual hallucinations  Pt alert and oriented to person, place and time  Patient reports she was at home and there were a bunch of people there but no one else can see them  She states she just wants them to go home but they didn't leave so she left  She went out for a long walk with the intention of visiting her sister  States along the way there were many cars following her  She stopped at a restaurant and had a cup of coffee then saw her son walk into the restaurant  She states her son brought her to the emergency room although per ER she was brought in by ambulance  Patient denies auditory hallucinations, states the hallucinations rarely speak to her  Denies suicidal ideation  Denies fever, shortness of breath, chest pain, abdominal pain, NVCD or dysuria  Review of Systems:    Review of Systems   Constitutional: Positive for appetite change  Negative for chills, fatigue and fever  Poor appetite   HENT: Negative  Eyes: Negative  Respiratory: Positive for shortness of breath  Negative for cough, choking, chest tightness, wheezing and stridor  States she is always short of breath   Cardiovascular: Negative  Gastrointestinal: Negative  Endocrine: Negative  Genitourinary: Negative      Musculoskeletal: Negative  Skin: Negative  Neurological: Negative  Hematological: Negative  Psychiatric/Behavioral: Positive for hallucinations  Negative for suicidal ideas  Past Medical and Surgical History:     Past Medical History:   Diagnosis Date    Diabetes mellitus ([facility] [address] )     Disease of thyroid gland     Heart attack ([facility] )     High cholesterol     Hypertension        History reviewed  No pertinent surgical history  Meds/Allergies:    Prior to Admission medications    Medication Sig Start Date End Date Taking?  Authorizing Provider   ARIPiprazole (ABILIFY) 10 mg tablet Take 1 tablet (10 mg total) by mouth daily 9/8/20   Mina Devine, MD   aspirin (ECOTRIN LOW STRENGTH) 81 mg EC tablet Take 1 tablet (81 mg total) by mouth daily 9/7/20   Mina Devine, MD   famotidine (PEPCID) 20 mg tablet Take 1 tablet (20 mg total) by mouth 2 (two) times a day 9/7/20   Mina Devine, MD   folic acid (FOLVITE) 1 mg tablet Take 1 tablet (1 mg total) by mouth daily 9/7/20   Mina Devine, MD   furosemide (LASIX) 20 mg tablet Take 1 tablet (20 mg total) by mouth daily 9/8/20   Mina Devine MD   glimepiride (AMARYL) 2 mg tablet Take 1 tablet (2 mg total) by mouth daily with breakfast 9/7/20   Mina Devine MD   levothyroxine 100 mcg tablet Take 1 tablet (100 mcg total) by mouth daily in the early morning 9/8/20   Mina Devine MD   melatonin 3 mg Take 2 tablets (6 mg total) by mouth daily at bedtime 9/7/20   Mina Devine MD   metoprolol tartrate (LOPRESSOR) 25 mg tablet Take 1 tablet (25 mg total) by mouth every 12 (twelve) hours 9/7/20   Mina Devine MD   nitroglycerin (NITROSTAT) 0 4 mg SL tablet Place 0 4 mg under the tongue every 5 (five) minutes as needed for chest pain    Historical Provider, MD   omalizumab (Xolair) 150 mg 150 mg by Subcutaneous (multi inj) route every 28 days    Historical Provider, MD   potassium chloride (K-DUR,KLOR-CON) 20 mEq tablet Take 1 tablet (20 mEq total) by mouth daily 9/7/20   Mina Devine MD sertraline (ZOLOFT) 50 mg tablet Take 1 tablet (50 mg total) by mouth daily 9/8/20   Ascencion Martinez MD   vitamin B-12 (VITAMIN B-12) 1,000 mcg tablet Take 1 tablet (1,000 mcg total) by mouth daily 9/7/20   Ascencion Martinez MD   warfarin (COUMADIN) 2 mg tablet 4 mg every Monday, Tuesday, Wednesday, Thursday and Friday  2 mg every Saturday and Sunday 9/9/20   Lela Colmenares PA-C     I have reviewed home medications using allscripts  Allergies: Allergies   Allergen Reactions    Shellfish-Derived Products Anaphylaxis    Adhesive [Medical Tape]     Amoxicillin     Ancef [Cefazolin]     Cephalosporins     Dofetilide      Cannot take generic    Epinephrine     Erythromycin     Iodine     Levofloxacin     Losartan     Morphine     Other      seafood    Oxycodone     Penicillins     Percocet [Oxycodone-Acetaminophen]     Pineapple     Thorazine [Chlorpromazine]        Social History:     Marital Status: Single   Occupation:  Retired  Patient Pre-hospital Living Situation:  Resides at home alone  Patient Pre-hospital Level of Mobility:  Walker  Patient Pre-hospital Diet Restrictions:   Substance Use History:   Social History     Substance and Sexual Activity   Alcohol Use Never    Frequency: Never     Social History     Tobacco Use   Smoking Status Never Smoker   Smokeless Tobacco Never Used     Social History     Substance and Sexual Activity   Drug Use Never       Family History:    History reviewed  No pertinent family history  Physical Exam:     Vitals:   Blood Pressure: 117/59 (09/20/20 0044)  Pulse: 69 (09/20/20 0044)  Temperature: 97 7 °F (36 5 °C) (09/20/20 0044)  Temp Source: Oral (09/19/20 2132)  Respirations: 20 (09/20/20 0044)  Weight - Scale: 87 9 kg (193 lb 12 6 oz) (09/19/20 2132)  SpO2: 96 % (09/20/20 0044)    Physical Exam  Constitutional:       General: She is not in acute distress  Appearance: Normal appearance  She is obese   She is not ill-appearing, toxic-appearing or diaphoretic  HENT:      Head: Normocephalic and atraumatic  Nose: No congestion or rhinorrhea  Mouth/Throat:      Mouth: Mucous membranes are moist    Eyes:      General: No scleral icterus  Extraocular Movements: Extraocular movements intact  Neck:      Musculoskeletal: Neck supple  No neck rigidity  Cardiovascular:      Rate and Rhythm: Normal rate and regular rhythm  Heart sounds: No murmur  Comments: Midline chest incision from prior CABG  Pulmonary:      Effort: Pulmonary effort is normal       Breath sounds: Normal breath sounds  Abdominal:      General: Bowel sounds are normal  There is no distension  Palpations: Abdomen is soft  There is no mass  Tenderness: There is abdominal tenderness  There is no guarding  Comments: Mild tenderness on palpation of all quadrants   Musculoskeletal:         General: No signs of injury  Right lower leg: Edema present  Left lower leg: No edema  Comments: 1+ edema RLE   Skin:     General: Skin is warm and dry  Coloration: Skin is not jaundiced or pale  Findings: Bruising present  Comments: B/L forearm ecchymosis   Neurological:      Mental Status: She is alert and oriented to person, place, and time  Mental status is at baseline  Psychiatric:         Mood and Affect: Mood normal          Behavior: Behavior normal          Judgment: Judgment normal      Additional Data:     Lab Results: I have personally reviewed pertinent reports        Results from last 7 days   Lab Units 09/19/20  2216   WBC Thousand/uL 7 12   HEMOGLOBIN g/dL 11 7   HEMATOCRIT % 37 0   PLATELETS Thousands/uL 187   NEUTROS PCT % 80*   LYMPHS PCT % 12*   MONOS PCT % 6   EOS PCT % 1     Results from last 7 days   Lab Units 09/19/20  2216   SODIUM mmol/L 139   POTASSIUM mmol/L 3 2*   CHLORIDE mmol/L 103   CO2 mmol/L 32   BUN mg/dL 14   CREATININE mg/dL 1 41*   ANION GAP mmol/L 4   CALCIUM mg/dL 9 6   GLUCOSE RANDOM mg/dL 111 Results from last 7 days   Lab Units 09/19/20  2216   INR  1 57*                   Imaging: I have personally reviewed pertinent reports  CT head without contrast   Final Result by Jacob Jewell MD (09/19 2349)      No acute intracranial hemorrhage, midline shift, or mass effect  Workstation performed: [id number]             EKG, Pathology, and Other Studies Reviewed on Admission:   ECHO CT    Allscripts / Epic Records Reviewed: Yes     ** Please Note: This note has been constructed using a voice recognition system   **

## 2020-09-20 NOTE — ASSESSMENT & PLAN NOTE
· AFib anticoagulated on warfarin, rate controlled with metoprolol  · INR subtherapeutic 1 5, cannot verify medication compliance  · Monitor INR

## 2020-09-20 NOTE — ASSESSMENT & PLAN NOTE
Lab Results   Component Value Date    HGBA1C 6 2 (H) 07/16/2020   · Will hold oral hypoglycemics and treat with sliding scale insulin  · ADA diet

## 2020-09-20 NOTE — PROGRESS NOTES
Progress Note - Judy Jordan 82 y o  female MRN: [id number]    Unit/Bed#: [facility] -01 Encounter: [id number]      Subjective: The patient is feeling pretty well  She slept okay  She has had no chest pain, shortness of breath, palpitations, or dizziness  She has no abdominal pain, nausea, or vomiting  She is not having hallucinations at the moment  She recalls quite clearly seeing people in her house last evening that no one else sees  She said that 1 of them was going to take her walker  Because of this, the patient left the house and was found a roughly 1 mile away  The patient recounted that she has been hospitalized both in [place] and at [facility] for similar problems  She told me that it was felt that this was related to dementia  Physical Exam:   Temp:  [97 5 °F (36 4 °C)-97 9 °F (36 6 °C)] 97 6 °F (36 4 °C)  HR:  [61-84] 67  Resp:  [16-20] 18  BP: ()/(44-68) 122/57    Gen:  Well-developed, obese, in no distress  Neck:  Supple  No lymphadenopathy, goiter, or bruit  Heart:  Regular rhythm  No murmur, gallop, or rub  Lungs:  Clear to auscultation and percussion  No wheezing, rales, or rhonchi    Abd:  Soft with active bowel sounds  No mass, tenderness, or organomegaly  Extremities:  No clubbing, cyanosis, or edema  No calf tenderness  Neuro:  Alert and oriented  No focal sign  Skin:  Warm and dry      LABS:   CBC:   Lab Results   Component Value Date    WBC 7 12 09/19/2020    HGB 11 7 09/19/2020    HCT 37 0 09/19/2020     (H) 09/19/2020     09/19/2020    MCH 32 1 09/19/2020    MCHC 31 6 09/19/2020    RDW 13 0 09/19/2020    MPV 9 8 09/19/2020    NRBC 0 09/19/2020   , CMP:   Lab Results   Component Value Date    SODIUM 139 09/19/2020    K 3 2 (L) 09/19/2020     09/19/2020    CO2 32 09/19/2020    BUN 14 09/19/2020    CREATININE 1 41 (H) 09/19/2020    CALCIUM 9 6 09/19/2020    EGFR 34 09/19/2020           Assessment/Plan:  1  Hallucinations  2   History of dementia  3  Type 2 diabetes with borderline hypoglycemia  4  Coronary artery disease with ischemic cardiomyopathy  5  Paroxysmal atrial fibrillation     The patient has had glucoses as low as 60  She is on glimepiride  It is conceivable that some of her problems from last evening related to hypoglycemia  Glimepiride will be stopped and we will monitor her sugar  Psychiatric re-evaluation has been requested  The patient's INR was subtherapeutic  Warfarin was adjusted      VTE Pharmacologic Prophylaxis: Warfarin (Coumadin)  VTE Mechanical Prophylaxis: reason for no mechanical VTE prophylaxis Therapeutically anticoagulated

## 2020-09-20 NOTE — ED PROVIDER NOTES
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 ([address] )    4  Chronic back pain        Chief Complaint   Patient presents with    Altered Mental Status     per EMS newly diagnosed dimentia and hallucinations, per EMS pt was trying to get away from voices in her head followed a string of leia lights [place] the diner [place]  HPI  82-year-old woman history of thyroid disease diabetes dementia CHF on Lasix    Patient has hallucinations tonight  She has had these intermittently since May  Possibly before  Tonight she was hallucinating that there was a party going on in her apartment people with leaves she left  She was trying to [facility] - [address] [place] [address] -  [facility] [place] to her sister's   Family no she was missing called police she was found about a mi and a half from her house walking with her walker  She is very oriented at this time  Alert  She has no complaints at this time  She is no longer having hallucinations  Her son is in the room during my evaluation and they both agree that she needs placement where she can be observed more frequently  She has no urinary symptoms though UTI Is high on the differential   Person her symptoms seemed to wax and wane  - No language barrier    - History obtained from patient  - There are no limitations to the history obtained  - Previous charting underwent limited review with attention to labs, ekgs, and prior imaging  PMH:   has a past medical history of Diabetes mellitus ([address] ), Disease of thyroid gland, Heart attack ([address] ), High cholesterol, and Hypertension  PSH:   has no past surgical history on file  ROS:  Review of Systems   Constitutional: Negative for activity change, appetite change, chills, diaphoresis, fatigue and fever     HENT: Negative for congestion, facial swelling, mouth sores, rhinorrhea, sinus pain, sneezing, sore throat, trouble swallowing and voice change  Eyes: Negative for photophobia and visual disturbance  Respiratory: Negative for cough, chest tightness, shortness of breath, wheezing and stridor  Cardiovascular: Negative for chest pain, palpitations and leg swelling  Gastrointestinal: Negative for abdominal distention, abdominal pain, blood in stool, constipation, diarrhea, nausea and vomiting  Genitourinary: Negative for decreased urine volume, difficulty urinating, dysuria, flank pain, frequency, menstrual problem, pelvic pain, vaginal bleeding and vaginal discharge  Musculoskeletal: Positive for back pain  Negative for arthralgias, gait problem, neck pain and neck stiffness  Skin: Negative for color change, rash and wound  Neurological: Negative for dizziness, syncope, facial asymmetry, speech difficulty, weakness, light-headedness, numbness and headaches  Psychiatric/Behavioral: Positive for confusion and hallucinations  Negative for self-injury and suicidal ideas  The patient is not nervous/anxious  PE:   Vitals:    09/19/20 2132   BP: 140/68   BP Location: Right arm   Pulse: 84   Resp: 18   Temp: 97 9 °F (36 6 °C)   TempSrc: Oral   SpO2: 99%   Weight: 87 9 kg (193 lb 12 6 oz)     Vitals reviewed by me  Physical Exam  Vitals signs and nursing note reviewed  Constitutional:       General: She is not in acute distress  Appearance: Normal appearance  She is well-developed  She is not toxic-appearing or diaphoretic  HENT:      Head: Normocephalic and atraumatic  Right Ear: External ear normal       Left Ear: External ear normal       Nose: Nose normal       Mouth/Throat:      Mouth: Mucous membranes are moist    Eyes:      General: No scleral icterus  Right eye: No discharge  Left eye: No discharge  Extraocular Movements: Extraocular movements intact  Conjunctiva/sclera: Conjunctivae normal       Pupils: Pupils are equal, round, and reactive to light     Neck:      Musculoskeletal: Normal range of motion and neck supple  No neck rigidity  Cardiovascular:      Rate and Rhythm: Normal rate and regular rhythm  Pulses: Normal pulses  Pulmonary:      Effort: Pulmonary effort is normal  No respiratory distress  Breath sounds: No stridor  Abdominal:      General: There is no distension  Palpations: Abdomen is soft  There is no mass  Tenderness: There is no abdominal tenderness  There is no right CVA tenderness, left CVA tenderness, guarding or rebound  Musculoskeletal: Normal range of motion  General: Swelling (bilateral symmetric) present  No tenderness, deformity or signs of injury  Lymphadenopathy:      Cervical: No cervical adenopathy  Skin:     General: Skin is warm and dry  Capillary Refill: Capillary refill takes less than 2 seconds  Coloration: Skin is not jaundiced or pale  Findings: No rash  Neurological:      General: No focal deficit present  Mental Status: She is alert and oriented to person, place, and time  Mental status is at baseline  Cranial Nerves: No cranial nerve deficit  Sensory: No sensory deficit  Motor: No weakness  Coordination: Coordination normal       Gait: Gait normal       Deep Tendon Reflexes: Reflexes normal    Psychiatric:         Mood and Affect: Mood normal          Behavior: Behavior normal          Thought Content: Thought content normal          Judgment: Judgment normal           A:  - Nursing note reviewed  Differential include but not limited to urinary tract infection delirium dementia depression with psychotic features, hypo or hyperthyroidism, or likely hypothyroidism    Will check basic labs, will check thyroid function will check urine    For acute altered mental status on Coumadin will check CT head    Will check INR                          CT head without contrast   Final Result      No acute intracranial hemorrhage, midline shift, or mass effect  Workstation performed: [id number]           Orders Placed This Encounter   Procedures    Urine culture    CT head without contrast    CBC and differential    Basic metabolic panel    Protime-INR    TSH, 3rd generation with Free T4 reflex    Urine Microscopic    Insert peripheral IV    POCT urinalysis dipstick     Labs Reviewed   CBC AND DIFFERENTIAL - Abnormal       Result Value Ref Range Status    WBC 7 12  4 31 - 10 16 Thousand/uL Final    RBC 3 65 (*) 3 81 - 5 12 Million/uL Final    Hemoglobin 11 7  11 5 - 15 4 g/dL Final    Hematocrit 37 0  34 8 - 46 1 % Final     (*) 82 - 98 fL Final    MCH 32 1  26 8 - 34 3 pg Final    MCHC 31 6  31 4 - 37 4 g/dL Final    RDW 13 0  11 6 - 15 1 % Final    MPV 9 8  8 9 - 12 7 fL Final    Platelets 817  520 - 390 Thousands/uL Final    nRBC 0  /100 WBCs Final    Neutrophils Relative 80 (*) 43 - 75 % Final    Immat GRANS % 1  0 - 2 % Final    Lymphocytes Relative 12 (*) 14 - 44 % Final    Monocytes Relative 6  4 - 12 % Final    Eosinophils Relative 1  0 - 6 % Final    Basophils Relative 0  0 - 1 % Final    Neutrophils Absolute 5 67  1 85 - 7 62 Thousands/µL Final    Immature Grans Absolute 0 04  0 00 - 0 20 Thousand/uL Final    Lymphocytes Absolute 0 85  0 60 - 4 47 Thousands/µL Final    Monocytes Absolute 0 44  0 17 - 1 22 Thousand/µL Final    Eosinophils Absolute 0 09  0 00 - 0 61 Thousand/µL Final    Basophils Absolute 0 03  0 00 - 0 10 Thousands/µL Final   BASIC METABOLIC PANEL - Abnormal    Sodium 139  136 - 145 mmol/L Final    Potassium 3 2 (*) 3 5 - 5 3 mmol/L Final    Chloride 103  100 - 108 mmol/L Final    CO2 32  21 - 32 mmol/L Final    ANION GAP 4  4 - 13 mmol/L Final    BUN 14  5 - 25 mg/dL Final    Creatinine 1 41 (*) 0 60 - 1 30 mg/dL Final    Comment: Standardized to IDMS reference method    Glucose 111  65 - 140 mg/dL Final    Comment: If the patient is fasting, the ADA then defines impaired fasting glucose as > 100 mg/dL and diabetes as > or equal to 123 mg/dL  Specimen collection should occur prior to Sulfasalazine administration due to the potential for falsely depressed results  Specimen collection should occur prior to Sulfapyridine administration due to the potential for falsely elevated results      Calcium 9 6  8 3 - 10 1 mg/dL Final    eGFR 34  ml/min/1 73sq m Final    Narrative:     [place] guidelines for Chronic Kidney Disease (CKD):     Stage 1 with normal or high GFR (GFR > 90 mL/min/1 73 square meters)    Stage 2 Mild CKD (GFR = 60-89 mL/min/1 73 square meters)    Stage 3A Moderate CKD (GFR = 45-59 mL/min/1 73 square meters)    Stage 3B Moderate CKD (GFR = 30-44 mL/min/1 73 square meters)    Stage 4 Severe CKD (GFR = 15-29 mL/min/1 73 square meters)    Stage 5 End Stage CKD (GFR <15 mL/min/1 73 square meters)  Note: GFR calculation is accurate only with a steady state creatinine   PROTIME-INR - Abnormal    Protime 18 4 (*) 11 6 - 14 5 seconds Final    INR 1 57 (*) 0 84 - 1 19 Final   URINE MICROSCOPIC - Abnormal    RBC, UA None Seen  None Seen, 0-5 /hpf Final    WBC, UA None Seen  None Seen, 0-5, 5-55, 5-65 /hpf Final    Epithelial Cells Occasional  None Seen, Occasional /hpf Final    Bacteria, UA Moderate (*) None Seen, Occasional /hpf Final   URINE MACROSCOPIC, POC - Abnormal    Color, UA Yellow   Final    Clarity, UA Cloudy   Final    pH, UA 5 5  4 5 - 8 0 Final    Leukocytes, UA Negative  Negative Final    Nitrite, UA Positive (*) Negative Final    Protein, UA Negative  Negative mg/dl Final    Glucose, UA Negative  Negative mg/dl Final    Ketones, UA Negative  Negative mg/dl Final    Urobilinogen, UA 0 2  0 2, 1 0 E U /dl E U /dl Final    Bilirubin, UA Negative  Negative Final    Blood, UA Negative  Negative Final    Specific [place], UA 1 015  1 003 - 1 030 Final    Narrative:     CLINITEK RESULT   TSH, 3RD GENERATION WITH FREE T4 REFLEX - Normal    TSH 3RD GENERATON 1 723  0 358 - 3 740 uIU/mL Final Comment: The recommended reference ranges for TSH during pregnancy are as follows:   First trimester 0 1 to 2 5 uIU/mL   Second trimester  0 2 to 3 0 uIU/mL   Third trimester 0 3 to 3 0 uIU/m    Note: Normal ranges may not apply to patients who are transgender, non-binary, or whose legal sex, sex at birth, and gender identity differ  Using supplements with high doses of biotin 20 to more than 300 times greater than the adequate daily intake for adults of 30 mcg/day as established by the [employer], can cause falsely depress results  Narrative:     Patients undergoing fluorescein dye angiography may retain small amounts of fluorescein in the body for 48-72 hours post procedure  Samples containing fluorescein can produce falsely depressed TSH values  If the patient had this procedure,a specimen should be resubmitted post fluorescein clearance  POCT URINALYSIS DIPSTICK - Normal    Color, UA yellow   Final   URINE CULTURE       Final Diagnosis:  1  Dementia ([facility] )    2  Hallucinations    3  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 ([place] [address] )    4   Chronic back pain        P:  Admit to [facility] for hallucination, placement    Medications   acetaminophen (TYLENOL) tablet 975 mg (975 mg Oral Given 9/19/20 2229)     Time reflects when diagnosis was documented in both MDM as applicable and the Disposition within this note     Time User Action Codes Description Comment    9/19/2020 10:46 PM Shelba Rom Add [F03 90] Dementia ([facility] )     9/19/2020 10:46 PM Shelba Rom Add [R44 3] Hallucinations     9/19/2020 10:47 PM Shelba Rom Add [F02 81,  Z91 83] 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 ([facility] )     9/19/2020 10:47 PM Shelba Rom Add [M54 9,  G89 29] Chronic back pain     9/19/2020 10:47 PM Shelba Rom Remove [M54 9,  G89 29] Chronic back pain     9/19/2020 10:47 PM Shelba Rom Add [M54 9,  G89 29] Chronic back pain       ED Disposition     ED Disposition Condition Date/Time Comment    Admit Stable Sat Sep 19, 2020 11:47 PM Case was discussed with [facility] and the patient's admission status was agreed to be Admission Status: inpatient status         Follow-up Information    None       Patient's Medications   Discharge Prescriptions    No medications on file     No discharge procedures on file  Prior to Admission Medications   Prescriptions Last Dose Informant Patient Reported? Taking?    ARIPiprazole (ABILIFY) 10 mg tablet   No No   Sig: Take 1 tablet (10 mg total) by mouth daily   aspirin (ECOTRIN LOW STRENGTH) 81 mg EC tablet   No No   Sig: Take 1 tablet (81 mg total) by mouth daily   famotidine (PEPCID) 20 mg tablet   No No   Sig: Take 1 tablet (20 mg total) by mouth 2 (two) times a day   folic acid (FOLVITE) 1 mg tablet   No No   Sig: Take 1 tablet (1 mg total) by mouth daily   furosemide (LASIX) 20 mg tablet   No No   Sig: Take 1 tablet (20 mg total) by mouth daily   glimepiride (AMARYL) 2 mg tablet   No No   Sig: Take 1 tablet (2 mg total) by mouth daily with breakfast   levothyroxine 100 mcg tablet   No No   Sig: Take 1 tablet (100 mcg total) by mouth daily in the early morning   melatonin 3 mg   No No   Sig: Take 2 tablets (6 mg total) by mouth daily at bedtime   metoprolol tartrate (LOPRESSOR) 25 mg tablet   No No   Sig: Take 1 tablet (25 mg total) by mouth every 12 (twelve) hours   nitroglycerin (NITROSTAT) 0 4 mg SL tablet   Yes No   Sig: Place 0 4 mg under the tongue every 5 (five) minutes as needed for chest pain   omalizumab (Xolair) 150 mg   Yes No   Si mg by Subcutaneous (multi inj) route every 28 days   potassium chloride (K-DUR,KLOR-CON) 20 mEq tablet   No No   Sig: Take 1 tablet (20 mEq total) by mouth daily   sertraline (ZOLOFT) 50 mg tablet   No No   Sig: Take 1 tablet (50 mg total) by mouth daily   vitamin B-12 (VITAMIN B-12) 1,000 mcg tablet   No No   Sig: Take 1 tablet (1,000 mcg total) by mouth daily   warfarin (COUMADIN) 2 mg tablet   No No   Si mg every Monday, Tuesday, Wednesday, Thursday and Friday  2 mg every Saturday and       Facility-Administered Medications: None       Portions of the record may have been created with voice recognition software  Occasional wrong word or "sound a like" substitutions may have occurred due to the inherent limitations of voice recognition software  Read the chart carefully and recognize, using context, where substitutions have occurred      Electronically signed by:  Christina Villegas, PGY 2, MD Sandy Galicia MD  20 0000

## 2020-09-20 NOTE — PLAN OF CARE
Problem: Potential for Falls  Goal: Patient will remain free of falls  Description: INTERVENTIONS:  - Assess patient frequently for physical needs  -  Identify cognitive and physical deficits and behaviors that affect risk of falls  -  [place] fall precautions as indicated by assessment   - Educate patient/family on patient safety including physical limitations  - Instruct patient to call for assistance with activity based on assessment  - Modify environment to reduce risk of injury  - Consider OT/PT consult to assist with strengthening/mobility  Outcome: Progressing     Problem: Nutrition/Hydration-ADULT  Goal: Nutrient/Hydration intake appropriate for improving, restoring or maintaining nutritional needs  Description: Monitor and assess patient's nutrition/hydration status for malnutrition  Collaborate with interdisciplinary team and initiate plan and interventions as ordered  Monitor patient's weight and dietary intake as ordered or per policy  Utilize nutrition screening tool and intervene as necessary  Determine patient's food preferences and provide high-protein, high-caloric foods as appropriate       INTERVENTIONS:  - Monitor oral intake, urinary output, labs, and treatment plans  - Assess nutrition and hydration status and recommend course of action  - Evaluate amount of meals eaten  - Assist patient with eating if necessary   - Allow adequate time for meals  - Recommend/ encourage appropriate diets, oral nutritional supplements, and vitamin/mineral supplements  - Order, calculate, and assess calorie counts as needed  - Recommend, monitor, and adjust tube feedings and TPN/PPN based on assessed needs  - Assess need for intravenous fluids  - Provide specific nutrition/hydration education as appropriate  - Include patient/family/caregiver in decisions related to nutrition  Outcome: Progressing     Problem: SAFETY ADULT  Goal: Patient will remain free of falls  Description: INTERVENTIONS:  - Assess patient frequently for physical needs  -  Identify cognitive and physical deficits and behaviors that affect risk of falls    -  [place] fall precautions as indicated by assessment   - Educate patient/family on patient safety including physical limitations  - Instruct patient to call for assistance with activity based on assessment  - Modify environment to reduce risk of injury  - Consider OT/PT consult to assist with strengthening/mobility  Outcome: Progressing  Goal: Maintain or return to baseline ADL function  Description: INTERVENTIONS:  -  Assess patient's ability to carry out ADLs; assess patient's baseline for ADL function and identify physical deficits which impact ability to perform ADLs (bathing, care of mouth/teeth, toileting, grooming, dressing, etc )  - Assess/evaluate cause of self-care deficits   - Assess range of motion  - Assess patient's mobility; develop plan if impaired  - Assess patient's need for assistive devices and provide as appropriate  - Encourage maximum independence but intervene and supervise when necessary  - Involve family in performance of ADLs  - Assess for home care needs following discharge   - Consider OT consult to assist with ADL evaluation and planning for discharge  - Provide patient education as appropriate  Outcome: Progressing  Goal: Maintain or return mobility status to optimal level  Description: INTERVENTIONS:  - Assess patient's baseline mobility status (ambulation, transfers, stairs, etc )    - Identify cognitive and physical deficits and behaviors that affect mobility  - Identify mobility aids required to assist with transfers and/or ambulation (gait belt, sit-to-stand, lift, walker, cane, etc )  - [place] fall precautions as indicated by assessment  - Record patient progress and toleration of activity level on Mobility SBAR; progress patient to next Phase/Stage  - Instruct patient to call for assistance with activity based on assessment  - Consider rehabilitation consult to assist with strengthening/weightbearing, etc   Outcome: Progressing     Problem: DISCHARGE PLANNING  Goal: Discharge to home or other facility with appropriate resources  Description: INTERVENTIONS:  - Identify barriers to discharge w/patient and caregiver  - Arrange for needed discharge resources and transportation as appropriate  - Identify discharge learning needs (meds, wound care, etc )  - Arrange for interpretive services to assist at discharge as needed  - Refer to Case Management Department for coordinating discharge planning if the patient needs post-hospital services based on physician/advanced practitioner order or complex needs related to functional status, cognitive ability, or social support system  Outcome: Progressing     Problem: Knowledge Deficit  Goal: Patient/family/caregiver demonstrates understanding of disease process, treatment plan, medications, and discharge instructions  Description: Complete learning assessment and assess knowledge base    Interventions:  - Provide teaching at level of understanding  - Provide teaching via preferred learning methods  Outcome: Progressing

## 2020-09-20 NOTE — ED ATTENDING ATTESTATION
9/19/2020  Megan PILLAI DO, saw and evaluated the patient  I have discussed the patient with the resident/non-physician practitioner and agree with the resident's/non-physician practitioner's findings, Plan of Care, and MDM as documented in the resident's/non-physician practitioner's note, except where noted  All available labs and Radiology studies were reviewed  I was present for key portions of any procedure(s) performed by the resident/non-physician practitioner and I was immediately available to provide assistance  At this point I agree with the current assessment done in the Emergency Department  I have conducted an independent evaluation of this patient a history and physical is as follows:    ED Course     Pt seen and examined  83 yo female who was recently diagnosed with dementia and hallucinations, per EMS pt was sick of the people that keep partying in her apartment and told them if they didn't leave in 5 minutes she was leaving "So I grabbed my purse and was on my way"  Daughter was unable to get in touch with mother who lives alone (hallucinations are new with dementia) and could not find her and got worried and called police  Pt was found walking about 1 mile from her apartment  States she was following a strong of leia lights  Per daughter hallucinations of people partying her in house have been happening recently but this is the first time she has actually left the house  Daughter very upset about this and states pt needs placement as it is no longer safe for her to live alone  Pt only c/o chronic lower back pain  Will give tylenol and check labs, urine and CT head, admit to [facility] for placement  Urine + nitrates - awaiting micro  MIcro shows bacteria, no WBC - urine cx added, will hold off on abx and admit as planned for placement  If pt spikes fever or becomes symptomatic can send blood cxs and treat      Final diagnoses:   Dementia ([facility] )   Hallucinations   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

## 2020-09-21 ENCOUNTER — HOSPITAL ENCOUNTER (INPATIENT)
Facility: HOSPITAL | Age: 85
LOS: 25 days | Discharge: NURSING, REHAB OR LONG-TERM CARE | DRG: 057 | End: 2020-10-16
Attending: PSYCHIATRY & NEUROLOGY | Admitting: PSYCHIATRY & NEUROLOGY
Payer: COMMERCIAL

## 2020-09-21 VITALS
BODY MASS INDEX: 37.85 KG/M2 | SYSTOLIC BLOOD PRESSURE: 129 MMHG | OXYGEN SATURATION: 98 % | WEIGHT: 193.78 LBS | HEART RATE: 73 BPM | TEMPERATURE: 97.5 F | DIASTOLIC BLOOD PRESSURE: 79 MMHG | RESPIRATION RATE: 18 BRPM

## 2020-09-21 DIAGNOSIS — K21.9 GERD (GASTROESOPHAGEAL REFLUX DISEASE): ICD-10-CM

## 2020-09-21 DIAGNOSIS — I25.5 ISCHEMIC CARDIOMYOPATHY: ICD-10-CM

## 2020-09-21 DIAGNOSIS — F29 PSYCHOSIS, UNSPECIFIED PSYCHOSIS TYPE (HCC): ICD-10-CM

## 2020-09-21 DIAGNOSIS — F03.90 DEMENTIA (HCC): ICD-10-CM

## 2020-09-21 DIAGNOSIS — E53.8 FOLATE DEFICIENCY: ICD-10-CM

## 2020-09-21 DIAGNOSIS — E87.6 HYPOKALEMIA: ICD-10-CM

## 2020-09-21 DIAGNOSIS — E03.9 ACQUIRED HYPOTHYROIDISM: ICD-10-CM

## 2020-09-21 DIAGNOSIS — I25.10 CORONARY ARTERY DISEASE INVOLVING NATIVE CORONARY ARTERY OF NATIVE HEART WITHOUT ANGINA PECTORIS: ICD-10-CM

## 2020-09-21 DIAGNOSIS — R44.3 HALLUCINATIONS: ICD-10-CM

## 2020-09-21 DIAGNOSIS — F29 ATYPICAL PSYCHOSIS (HCC): Primary | ICD-10-CM

## 2020-09-21 DIAGNOSIS — F32.A DEPRESSIVE DISORDER: ICD-10-CM

## 2020-09-21 DIAGNOSIS — E53.8 B12 DEFICIENCY: ICD-10-CM

## 2020-09-21 DIAGNOSIS — I10 HYPERTENSION: ICD-10-CM

## 2020-09-21 LAB
ANION GAP SERPL CALCULATED.3IONS-SCNC: 7 MMOL/L (ref 4–13)
BASOPHILS # BLD AUTO: 0.03 THOUSANDS/ΜL (ref 0–0.1)
BASOPHILS NFR BLD AUTO: 1 % (ref 0–1)
BUN SERPL-MCNC: 13 MG/DL (ref 5–25)
CALCIUM SERPL-MCNC: 9 MG/DL (ref 8.3–10.1)
CHLORIDE SERPL-SCNC: 106 MMOL/L (ref 100–108)
CO2 SERPL-SCNC: 28 MMOL/L (ref 21–32)
CREAT SERPL-MCNC: 1.19 MG/DL (ref 0.6–1.3)
EOSINOPHIL # BLD AUTO: 0.12 THOUSAND/ΜL (ref 0–0.61)
EOSINOPHIL NFR BLD AUTO: 3 % (ref 0–6)
ERYTHROCYTE [DISTWIDTH] IN BLOOD BY AUTOMATED COUNT: 13.2 % (ref 11.6–15.1)
GFR SERPL CREATININE-BSD FRML MDRD: 42 ML/MIN/1.73SQ M
GLUCOSE SERPL-MCNC: 111 MG/DL (ref 65–140)
GLUCOSE SERPL-MCNC: 144 MG/DL (ref 65–140)
GLUCOSE SERPL-MCNC: 151 MG/DL (ref 65–140)
GLUCOSE SERPL-MCNC: 85 MG/DL (ref 65–140)
GLUCOSE SERPL-MCNC: 88 MG/DL (ref 65–140)
HCT VFR BLD AUTO: 33.3 % (ref 34.8–46.1)
HGB BLD-MCNC: 10.4 G/DL (ref 11.5–15.4)
IMM GRANULOCYTES # BLD AUTO: 0.01 THOUSAND/UL (ref 0–0.2)
IMM GRANULOCYTES NFR BLD AUTO: 0 % (ref 0–2)
LYMPHOCYTES # BLD AUTO: 1.32 THOUSANDS/ΜL (ref 0.6–4.47)
LYMPHOCYTES NFR BLD AUTO: 28 % (ref 14–44)
MCH RBC QN AUTO: 32.1 PG (ref 26.8–34.3)
MCHC RBC AUTO-ENTMCNC: 31.2 G/DL (ref 31.4–37.4)
MCV RBC AUTO: 103 FL (ref 82–98)
MONOCYTES # BLD AUTO: 0.46 THOUSAND/ΜL (ref 0.17–1.22)
MONOCYTES NFR BLD AUTO: 10 % (ref 4–12)
NEUTROPHILS # BLD AUTO: 2.8 THOUSANDS/ΜL (ref 1.85–7.62)
NEUTS SEG NFR BLD AUTO: 58 % (ref 43–75)
NRBC BLD AUTO-RTO: 0 /100 WBCS
PLATELET # BLD AUTO: 177 THOUSANDS/UL (ref 149–390)
PMV BLD AUTO: 10.3 FL (ref 8.9–12.7)
POTASSIUM SERPL-SCNC: 3.3 MMOL/L (ref 3.5–5.3)
RBC # BLD AUTO: 3.24 MILLION/UL (ref 3.81–5.12)
SARS-COV-2 RNA RESP QL NAA+PROBE: NEGATIVE
SODIUM SERPL-SCNC: 141 MMOL/L (ref 136–145)
WBC # BLD AUTO: 4.74 THOUSAND/UL (ref 4.31–10.16)

## 2020-09-21 PROCEDURE — 99239 HOSP IP/OBS DSCHRG MGMT >30: CPT | Performed by: STUDENT IN AN ORGANIZED HEALTH CARE EDUCATION/TRAINING PROGRAM

## 2020-09-21 PROCEDURE — 99221 1ST HOSP IP/OBS SF/LOW 40: CPT | Performed by: NURSE PRACTITIONER

## 2020-09-21 PROCEDURE — 97129 THER IVNTJ 1ST 15 MIN: CPT

## 2020-09-21 PROCEDURE — 97167 OT EVAL HIGH COMPLEX 60 MIN: CPT

## 2020-09-21 PROCEDURE — 80048 BASIC METABOLIC PNL TOTAL CA: CPT | Performed by: NURSE PRACTITIONER

## 2020-09-21 PROCEDURE — 87635 SARS-COV-2 COVID-19 AMP PRB: CPT | Performed by: STUDENT IN AN ORGANIZED HEALTH CARE EDUCATION/TRAINING PROGRAM

## 2020-09-21 PROCEDURE — 85025 COMPLETE CBC W/AUTO DIFF WBC: CPT | Performed by: NURSE PRACTITIONER

## 2020-09-21 PROCEDURE — 82948 REAGENT STRIP/BLOOD GLUCOSE: CPT

## 2020-09-21 PROCEDURE — 97163 PT EVAL HIGH COMPLEX 45 MIN: CPT

## 2020-09-21 RX ORDER — LORAZEPAM 1 MG/1
1 TABLET ORAL EVERY 8 HOURS PRN
Status: CANCELLED | OUTPATIENT
Start: 2020-09-21

## 2020-09-21 RX ORDER — LEVOTHYROXINE SODIUM 0.1 MG/1
100 TABLET ORAL
Status: DISCONTINUED | OUTPATIENT
Start: 2020-09-22 | End: 2020-10-16 | Stop reason: HOSPADM

## 2020-09-21 RX ORDER — ACETAMINOPHEN 325 MG/1
650 TABLET ORAL EVERY 6 HOURS PRN
Status: DISCONTINUED | OUTPATIENT
Start: 2020-09-21 | End: 2020-10-16 | Stop reason: HOSPADM

## 2020-09-21 RX ORDER — ONDANSETRON 2 MG/ML
4 INJECTION INTRAMUSCULAR; INTRAVENOUS EVERY 6 HOURS PRN
Status: DISCONTINUED | OUTPATIENT
Start: 2020-09-21 | End: 2020-09-22

## 2020-09-21 RX ORDER — LORAZEPAM 0.5 MG/1
0.5 TABLET ORAL EVERY 8 HOURS PRN
Status: CANCELLED | OUTPATIENT
Start: 2020-09-21

## 2020-09-21 RX ORDER — FUROSEMIDE 20 MG/1
20 TABLET ORAL DAILY
Status: DISCONTINUED | OUTPATIENT
Start: 2020-09-22 | End: 2020-10-16 | Stop reason: HOSPADM

## 2020-09-21 RX ORDER — ASPIRIN 81 MG/1
81 TABLET ORAL DAILY
Status: CANCELLED | OUTPATIENT
Start: 2020-09-22

## 2020-09-21 RX ORDER — FOLIC ACID 1 MG/1
1 TABLET ORAL DAILY
Status: CANCELLED | OUTPATIENT
Start: 2020-09-22

## 2020-09-21 RX ORDER — WARFARIN SODIUM 4 MG/1
4 TABLET ORAL
Status: DISCONTINUED | OUTPATIENT
Start: 2020-09-22 | End: 2020-09-21 | Stop reason: HOSPADM

## 2020-09-21 RX ORDER — MAGNESIUM HYDROXIDE/ALUMINUM HYDROXICE/SIMETHICONE 120; 1200; 1200 MG/30ML; MG/30ML; MG/30ML
30 SUSPENSION ORAL EVERY 4 HOURS PRN
Status: CANCELLED | OUTPATIENT
Start: 2020-09-21

## 2020-09-21 RX ORDER — NITROGLYCERIN 0.4 MG/1
0.4 TABLET SUBLINGUAL
Status: DISCONTINUED | OUTPATIENT
Start: 2020-09-21 | End: 2020-10-16 | Stop reason: HOSPADM

## 2020-09-21 RX ORDER — OLANZAPINE 2.5 MG/1
2.5 TABLET ORAL EVERY 8 HOURS PRN
Status: DISCONTINUED | OUTPATIENT
Start: 2020-09-21 | End: 2020-10-16 | Stop reason: HOSPADM

## 2020-09-21 RX ORDER — WARFARIN SODIUM 5 MG/1
5 TABLET ORAL
Status: CANCELLED | OUTPATIENT
Start: 2020-09-21

## 2020-09-21 RX ORDER — ASPIRIN 81 MG/1
81 TABLET ORAL DAILY
Status: DISCONTINUED | OUTPATIENT
Start: 2020-09-22 | End: 2020-10-16 | Stop reason: HOSPADM

## 2020-09-21 RX ORDER — FOLIC ACID 1 MG/1
1 TABLET ORAL DAILY
Status: DISCONTINUED | OUTPATIENT
Start: 2020-09-22 | End: 2020-10-16 | Stop reason: HOSPADM

## 2020-09-21 RX ORDER — OLANZAPINE 5 MG/1
5 TABLET ORAL EVERY 8 HOURS PRN
Status: DISCONTINUED | OUTPATIENT
Start: 2020-09-21 | End: 2020-10-16 | Stop reason: HOSPADM

## 2020-09-21 RX ORDER — FAMOTIDINE 20 MG/1
10 TABLET, FILM COATED ORAL DAILY
Status: CANCELLED | OUTPATIENT
Start: 2020-09-22

## 2020-09-21 RX ORDER — FAMOTIDINE 20 MG/1
10 TABLET, FILM COATED ORAL DAILY
Status: DISCONTINUED | OUTPATIENT
Start: 2020-09-22 | End: 2020-10-16 | Stop reason: HOSPADM

## 2020-09-21 RX ORDER — OLANZAPINE 5 MG/1
5 TABLET ORAL EVERY 8 HOURS PRN
Status: CANCELLED | OUTPATIENT
Start: 2020-09-21

## 2020-09-21 RX ORDER — LANOLIN ALCOHOL/MO/W.PET/CERES
6 CREAM (GRAM) TOPICAL
Status: DISCONTINUED | OUTPATIENT
Start: 2020-09-21 | End: 2020-10-16 | Stop reason: HOSPADM

## 2020-09-21 RX ORDER — ACETAMINOPHEN 325 MG/1
650 TABLET ORAL EVERY 6 HOURS PRN
Status: CANCELLED | OUTPATIENT
Start: 2020-09-21

## 2020-09-21 RX ORDER — NITROGLYCERIN 0.4 MG/1
0.4 TABLET SUBLINGUAL
Status: CANCELLED | OUTPATIENT
Start: 2020-09-21

## 2020-09-21 RX ORDER — ARIPIPRAZOLE 10 MG/1
10 TABLET ORAL DAILY
Status: CANCELLED | OUTPATIENT
Start: 2020-09-22

## 2020-09-21 RX ORDER — POTASSIUM CHLORIDE 20 MEQ/1
20 TABLET, EXTENDED RELEASE ORAL DAILY
Status: DISCONTINUED | OUTPATIENT
Start: 2020-09-22 | End: 2020-10-16 | Stop reason: HOSPADM

## 2020-09-21 RX ORDER — WARFARIN SODIUM 4 MG/1
4 TABLET ORAL
Status: CANCELLED | OUTPATIENT
Start: 2020-09-22

## 2020-09-21 RX ORDER — LORAZEPAM 0.5 MG/1
0.5 TABLET ORAL EVERY 8 HOURS PRN
Status: DISCONTINUED | OUTPATIENT
Start: 2020-09-21 | End: 2020-10-16 | Stop reason: HOSPADM

## 2020-09-21 RX ORDER — ACETAMINOPHEN 325 MG/1
975 TABLET ORAL EVERY 6 HOURS PRN
Status: DISCONTINUED | OUTPATIENT
Start: 2020-09-21 | End: 2020-10-16 | Stop reason: HOSPADM

## 2020-09-21 RX ORDER — ACETAMINOPHEN 325 MG/1
975 TABLET ORAL EVERY 6 HOURS PRN
Status: CANCELLED | OUTPATIENT
Start: 2020-09-21

## 2020-09-21 RX ORDER — WARFARIN SODIUM 2 MG/1
4 TABLET ORAL
Status: DISCONTINUED | OUTPATIENT
Start: 2020-09-22 | End: 2020-09-24

## 2020-09-21 RX ORDER — FUROSEMIDE 20 MG/1
20 TABLET ORAL DAILY
Status: CANCELLED | OUTPATIENT
Start: 2020-09-22

## 2020-09-21 RX ORDER — TRAZODONE HYDROCHLORIDE 50 MG/1
50 TABLET ORAL
Status: CANCELLED | OUTPATIENT
Start: 2020-09-21

## 2020-09-21 RX ORDER — ONDANSETRON 2 MG/ML
4 INJECTION INTRAMUSCULAR; INTRAVENOUS EVERY 6 HOURS PRN
Status: CANCELLED | OUTPATIENT
Start: 2020-09-21

## 2020-09-21 RX ORDER — POTASSIUM CHLORIDE 20 MEQ/1
20 TABLET, EXTENDED RELEASE ORAL DAILY
Status: CANCELLED | OUTPATIENT
Start: 2020-09-22

## 2020-09-21 RX ORDER — ARIPIPRAZOLE 10 MG/1
10 TABLET ORAL DAILY
Status: DISCONTINUED | OUTPATIENT
Start: 2020-09-22 | End: 2020-09-22

## 2020-09-21 RX ORDER — TRAZODONE HYDROCHLORIDE 50 MG/1
50 TABLET ORAL
Status: DISCONTINUED | OUTPATIENT
Start: 2020-09-21 | End: 2020-09-24

## 2020-09-21 RX ORDER — MAGNESIUM HYDROXIDE/ALUMINUM HYDROXICE/SIMETHICONE 120; 1200; 1200 MG/30ML; MG/30ML; MG/30ML
30 SUSPENSION ORAL EVERY 4 HOURS PRN
Status: DISCONTINUED | OUTPATIENT
Start: 2020-09-21 | End: 2020-10-16 | Stop reason: HOSPADM

## 2020-09-21 RX ORDER — LORAZEPAM 1 MG/1
1 TABLET ORAL EVERY 8 HOURS PRN
Status: DISCONTINUED | OUTPATIENT
Start: 2020-09-21 | End: 2020-10-16 | Stop reason: HOSPADM

## 2020-09-21 RX ORDER — LANOLIN ALCOHOL/MO/W.PET/CERES
6 CREAM (GRAM) TOPICAL
Status: CANCELLED | OUTPATIENT
Start: 2020-09-21

## 2020-09-21 RX ORDER — LEVOTHYROXINE SODIUM 0.1 MG/1
100 TABLET ORAL
Status: CANCELLED | OUTPATIENT
Start: 2020-09-22

## 2020-09-21 RX ORDER — OLANZAPINE 5 MG/1
2.5 TABLET ORAL EVERY 8 HOURS PRN
Status: CANCELLED | OUTPATIENT
Start: 2020-09-21

## 2020-09-21 RX ADMIN — FAMOTIDINE 10 MG: 20 TABLET ORAL at 08:27

## 2020-09-21 RX ADMIN — SERTRALINE HYDROCHLORIDE 50 MG: 50 TABLET ORAL at 08:27

## 2020-09-21 RX ADMIN — METOPROLOL TARTRATE 25 MG: 25 TABLET, FILM COATED ORAL at 20:36

## 2020-09-21 RX ADMIN — LEVOTHYROXINE SODIUM 100 MCG: 100 TABLET ORAL at 05:44

## 2020-09-21 RX ADMIN — CYANOCOBALAMIN TAB 500 MCG 1000 MCG: 500 TAB at 08:26

## 2020-09-21 RX ADMIN — POTASSIUM CHLORIDE 20 MEQ: 1500 TABLET, EXTENDED RELEASE ORAL at 08:25

## 2020-09-21 RX ADMIN — ASPIRIN 81 MG: 81 TABLET, COATED ORAL at 08:27

## 2020-09-21 RX ADMIN — WARFARIN SODIUM 5 MG: 5 TABLET ORAL at 17:59

## 2020-09-21 RX ADMIN — ARIPIPRAZOLE 10 MG: 10 TABLET ORAL at 08:25

## 2020-09-21 RX ADMIN — METOPROLOL TARTRATE 25 MG: 25 TABLET, FILM COATED ORAL at 08:26

## 2020-09-21 RX ADMIN — FOLIC ACID 1 MG: 1 TABLET ORAL at 08:26

## 2020-09-21 RX ADMIN — FUROSEMIDE 20 MG: 20 TABLET ORAL at 08:25

## 2020-09-21 NOTE — PLAN OF CARE
Problem: PHYSICAL THERAPY ADULT  Goal: Performs mobility at highest level of function for planned discharge setting  See evaluation for individualized goals  Description: Treatment/Interventions: Functional transfer training, LE strengthening/ROM, Therapeutic exercise, Endurance training, Patient/family training, Bed mobility, Gait training, Spoke to nursing  Equipment Recommended: Walker(pt has DME)       See flowsheet documentation for full assessment, interventions and recommendations  Note: Prognosis: Good  Problem List: Decreased cognition, Impaired judgement, Decreased safety awareness, Impaired balance, Decreased mobility, Decreased strength  Assessment: Pt  85 y  o female w/ h/o psychosis w/ paranoia, presented w/ visual hallucinations  [facility]al Health admissions [facility]  Pt admitted for Acute encephalopathy w/ Hallucinations & 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  Pt referred to PT for mobility assessment & D/C planning w/ orders of up & OOB as tolerated  PTA, pt reports being w/o AD inside home but admits to holding on to furnitures & uses rollator for community distances  On eval, pt demonstrate minimal dec mobility, balance, endurance & amb  Pt require S for most functional mobility + cues for techniques  Gait deviations as above, slow w/ dec foot clearance & strides but no gross LOB noted  Pt require cues to stay within RW BERHANE  No dizziness reported t/o session  Pt oriented x3 but scored low w/ ACLS per OT  Please see OT assessment for details  Nsg staff most recent vital signs as follows: /74   Pulse 66   Temp 98 °F (36 7 °C)   Resp 18   Wt 87 9 kg (193 lb 12 6 oz)   SpO2 97%   BMI 37 85 kg/m²   At end of session, pt OOB in chair w/o issues, call bell & phone in reach, chair alarm activated  Fall precautions reinforced w/ good understanding  Will continue skilled PT to improve function & safety   Pt appears to be functioning close to her baseline however pt not safe home alone as per OT cognitive assessment  If home pt will need 24hr supervision  If 24hr supervision not available at home then pt will need a more supervised environment  After IE, noted in chart, pt to D/C to inpt psych  CM to follow  Nsg staff to continue to mobilized pt (OOB in chair for all meals & ambulate in room/unit) as tolerated to prevent further decline in function  Nsg notified  Barriers to Discharge: Decreased caregiver support  Barriers to Discharge Comments: pt home alone  PT Discharge Recommendation: Other (Comment), Home with skilled therapy(HHPT if 24hr S available; pt not safe home alone)     PT - OK to Discharge: Yes(when medically cleared)    See flowsheet documentation for full assessment

## 2020-09-21 NOTE — PLAN OF CARE
Problem: OCCUPATIONAL THERAPY ADULT  Goal: Performs self-care activities at highest level of function for planned discharge setting  See evaluation for individualized goals  Description: Treatment Interventions: ADL retraining, Functional transfer training, UE strengthening/ROM, Cognitive reorientation, Endurance training, Patient/family training, Equipment evaluation/education, Compensatory technique education, Continued evaluation          See flowsheet documentation for full assessment, interventions and recommendations  Note: Limitation: Decreased ADL status, Decreased UE strength, Decreased Safe judgement during ADL, Decreased cognition, Decreased endurance, Decreased high-level ADLs  Prognosis: Fair  Assessment: Pt is a 84y/o female admitted to the hospital 2* symptoms of visual hallucinations  Pt note with acute encephalopathy  Pt with PMH CABG, MI, HTN, and psychosis with paranoia and visual hallucinations  PTA pt states independence with all aspects of her ADLs, transfers, ambulation--with RW(outside), SPC(inside); +falls=2, neg   During initial eval, pt demonstrated slight deficits with her functional balance, functional mobility, ADL status, activity tolerance(currently fair=15-20mins), transfer safety, b/l UE strength, and cognition(i e memory, problem-solving)  Pt would benefit from continued OT tx for the above deficits  3-5xwk/1-2wks  OT Discharge Recommendation: (24 hr supervision; continue PT/OT)  OT - OK to Discharge:  Yes

## 2020-09-21 NOTE — SOCIAL WORK
Case built with Lamar Crowley w/ clinicals given to Varinder Borrero at [facility]  Pt is approved for 3 days with [facility] 9/21/2020 and LCD 9/23/2020 with update to be given to [address] [place] [address] at [phone]  Authorization# [id number]  Informed transportation can be set up or after midnight- this is not an appropriate time for a demented woman having increased hallucinations at night- this CM reached out to [place] [id number] to ascertain if an earlier time can be arranged  Awaiting call back with same

## 2020-09-21 NOTE — PHYSICAL THERAPY NOTE
PT EVALUATION    Pt  Name: Savannah Quezada  Pt  Age: 82 y o  MRN: [id number]  LENGTH OF STAY: 1    Patient Active Problem List   Diagnosis    Diabetes mellitus type 2, noninsulin dependent ([facility] )    Acquired hypothyroidism    Coronary artery disease involving native coronary artery of native heart without angina pectoris    Paroxysmal atrial fibrillation (HCC)    Ischemic cardiomyopathy    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[facility] )    Mild neurocognitive disorder    CKD (chronic kidney disease) stage 3, GFR 30-59 ml/min (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 ([place] [address] ) [F02 81, Z91 83]  Dementia ([place] [address] ) [F03 90]  Chronic back pain [M54 9, G89 29]    Past Medical History:   Diagnosis Date    Arthritis     Heart attack ([place] [address] )     High cholesterol     Hypertension        Past Surgical History:   Procedure Laterality Date    ABDOMINAL SURGERY      cholecystectomy     CARDIAC SURGERY      bypass April 2000    HYSTERECTOMY         Imaging Studies:  CT head without contrast   Final Result by Andrei Mars MD (09/19 5836)      No acute intracranial hemorrhage, midline shift, or mass effect  Workstation performed: [id number]              09/21/20 0911   PT Last Visit   PT Visit Date 09/21/20   Note Type   Note type Eval only   Pain Assessment   Pain Score No Pain   Home Living   Type of Home Apartment   Home Layout One level  (0STE)   Bathroom Shower/Tub Tub/shower unit   [employer] Commode;Grab bars in shower; Tub transfer bench; Shower chair   Home Equipment Cane;Walker   Prior Function   Level of [place] Independent with ADLs and functional mobility  (w/o inside home & rollator for community distances)   Lives With Alone; Other (Comment)  (HHA 2x/wk )   Receives Help From Family   ADL Assistance Independent   Falls in the last 6 months 1 to 4  (2x)   Vocational Retired   Restrictions/Precautions   [facility] Bearing Precautions Per Order No   Other Precautions Cognitive; Chair Alarm; Bed Alarm; Fall Risk   General   Additional Pertinent History significant psychosis & paranoia w/ visual hallucinations   Family/Caregiver Present No   Cognition   Overall Cognitive Status Impaired   Arousal/Participation Alert   Following Commands Follows one step commands without difficulty   RUE Assessment   RUE Assessment   (refer to OT)   LUE Assessment   LUE Assessment   (refer to OT)   RLE Assessment   RLE Assessment WFL  (4/5 grossly)   LLE Assessment   LLE Assessment WFL  (4/5 grossly)   Coordination   Movements are Fluid and Coordinated 1   Sensation WFL   Bed Mobility   Supine to Sit 5  Supervision   Additional items HOB elevated; Bedrails; Increased time required;Verbal cues   Additional Comments cues for techniques & safety   Transfers   Sit to Stand 5  Supervision   Additional items Armrests; Increased time required;Verbal cues   Stand to Sit 5  Supervision   Additional items Armrests; Increased time required;Verbal cues   Additional Comments cues for techniques & safety   Ambulation/Elevation   Gait pattern Forward Flexion; Wide BERHANE; Decreased foot clearance; Excessively slow   Gait Assistance 5  Supervision   Additional items Verbal cues   Assistive Device Rolling walker   Distance 200'x1   Balance   Static Sitting Good   Static Standing Fair  (w/ RW)   Ambulatory Fair -  (w/ RW)   Endurance Deficit   Endurance Deficit No   Activity Tolerance   Activity Tolerance Patient tolerated treatment well   Medical Staff Made Aware OT Santino   Nurse Made Aware RN Lise Frame   Assessment   Prognosis Good   Problem List Decreased cognition; Impaired judgement;Decreased safety awareness; Impaired balance;Decreased mobility; Decreased strength   Assessment Pt  85 y  o female w/ h/o psychosis w/ paranoia, presented w/ visual hallucinations   Recent Behavioral Health admissions at [facility]  Pt admitted for Acute encephalopathy w/ Hallucinations & 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  Pt referred to PT for mobility assessment & D/C planning w/ orders of up & OOB as tolerated  PTA, pt reports being w/o AD inside home but admits to holding on to furnitures & uses rollator for community distances  On eval, pt demonstrate minimal dec mobility, balance, endurance & amb  Pt require S for most functional mobility + cues for techniques  Gait deviations as above, slow w/ dec foot clearance & strides but no gross LOB noted  Pt require cues to stay within RW BERHANE  No dizziness reported t/o session  Pt oriented x3 but scored low w/ ACLS per OT  Please see OT assessment for details  Nsg staff most recent vital signs as follows: /74   Pulse 66   Temp 98 °F (36 7 °C)   Resp 18   Wt 87 9 kg (193 lb 12 6 oz)   SpO2 97%   BMI 37 85 kg/m²   At end of session, pt OOB in chair w/o issues, call bell & phone in reach, chair alarm activated  Fall precautions reinforced w/ good understanding  Will continue skilled PT to improve function & safety  Pt appears to be functioning close to her baseline however pt not safe home alone as per OT cognitive assessment  If home pt will need 24hr supervision  If 24hr supervision not available at home then pt will need a more supervised environment  After IE, noted in chart, pt to D/C to inpt psych  CM to follow  [facility] staff to continue to mobilized pt (OOB in chair for all meals & ambulate in room/unit) as tolerated to prevent further decline in function  Nsg notified      Barriers to Discharge Decreased caregiver support   Barriers to Discharge Comments pt home alone   Goals   Patient Goals to get better   STG Expiration Date 09/28/20   Short Term Goal #1 Goals to be met in 7 days; pt will be able to: 1) inc strength & balance by 1/2 grade to improve overall functional mobility & dec fall risk; 2) inc bed mobility to modified I for pt to be able to get in/OOB safely w/ proper techniques 100% of the time, to dec caregiver burden & safely function at home; 3) inc transfers to modified I for pt to transition safely from one surface to another w/o % of the time, to dec caregiver burden & safely function at home; 4) inc amb w/ RW approx  >350' w/ modified I for pt to ambulate community distances w/o any % of the time, to dec caregiver burden & safely function at home; 5) inc barthel score to 85 to decrease overall risk for falls; 6) pt/caregiver ed   PT Treatment Day 0   Plan   Treatment/Interventions Functional transfer training;LE strengthening/ROM; Therapeutic exercise; Endurance training;Patient/family training;Bed mobility;Gait training;Spoke to nursing   PT Frequency 2-3x/wk   Recommendation   PT Discharge Recommendation Other (Comment); Home with skilled therapy  (HHPT if 24hr S available; pt not safe home alone)   Equipment Recommended Walker  (pt has DME)   PT - OK to Discharge Yes  (when medically cleared)   Additional Comments Pt appears to be functioning close to her baseline however pt not safe home alone as per OT cognitive assessment  If home pt will need 24hr supervision  If 24hr supervision not available at home then pt will need a more supervised environment  After IE, noted in chart, pt to D/C to inpt psych      Barthel Index   Feeding 10   Bathing 0   Grooming Score 5   Dressing Score 5   Bladder Score 10   Bowels Score 10   Toilet Use Score 5   Transfers (Bed/Chair) Score 10   Mobility (Level Surface) Score 10   Stairs Score 0   Barthel Index Score 65   Hx/personal factors: co-morbidities, home alone, advanced age, use of AD, dec cognition, h/o of falls, fall risk and assist w/ ADL's  Examination: dec mobility, dec balance, dec endurance, dec amb, moderate fall risk, dec cognition  Clinical: unpredictable (ongoing medical status, abnormal lab values and moderate fall risk)  Complexity: high     Constanza Okeefe PT

## 2020-09-21 NOTE — DISCHARGE SUMMARY
Discharge- Debo De La Cruz 1935, 82 y o  female MRN: [id number]    Unit/Bed#: [address] 202-01 Encounter: [id number]    Primary Care Provider: No primary care provider on file  Date and time admitted to hospital: 9/19/2020  9:27 PM        * Acute encephalopathy  Assessment & Plan  82year old female presenting with psychosis with paranoia and visual hallucinations  CT-head not showing any acute abnormalities  Urinalysis with E  Coli BUT ASYMPTOMATIC  No indication to treat asymptomatic bacteriuria at this time  Symptoms similar to previous episode  - Discharge to behavioral health for medication optimization  Diabetes mellitus type 2, noninsulin dependent ([facility] [address] )  Assessment & Plan  Held oral hypoglycemics due to hypoglycemic episode which may contribute to encephalopathy  Paroxysmal atrial fibrillation (HCC)  Assessment & Plan  Continue Warfarin 4mg for now  Repeat INR c/o receiving institution  Goal INR 2-3  · - Outpatient PCP followup within a week if needed if further coumadin dosing needs to be adjusted  Hypokalemia  Assessment & Plan  Continue Kcl for now  Repeat potassium in 3 days  CKD (chronic kidney disease) stage 3, GFR 30-59 ml/min ([facility])  Assessment & Plan  At baseline    Recent Labs     09/19/20  2216 09/21/20  0441   BUN 14 13   CREATININE 1 41* 1 19   EGFR 34 42       Results from last 7 days   Lab Units 09/19/20  2239   BLOOD UA  Negative   PROTEIN UA mg/dl Negative         Ischemic cardiomyopathy  Assessment & Plan  Not in acute exacerbation    Coronary artery disease involving native coronary artery of native heart without angina pectoris  Assessment & Plan  CAD s/p CABG  - Continue Aspirin and BB    Acquired hypothyroidism  Assessment & Plan  On levothyroxine 100mcg      Discharging Physician / Practitioner: Adelaida Olivas MD  PCP: No primary care provider on file    Admission Date:   Admission Orders (From admission, onward)     Ordered        09/20/20 0012  Inpatient Admission (expected length of stay for this patient Order details is greater than two midnights)  Once         Signed and Held  ED TO DIFFERENT CAMPUS IP [facility] or [address] [facility] [address] to IP [facility] (using Discharge Readmit Navigator) - Admit Patient to [address] [place] [address]  Once                   Discharge Date: 09/21/20    Resolved Problems  Date Reviewed: 9/21/2020    None          Consultations During Hospital Stay:  · Behavioral health    Procedures Performed:   · None    Significant Findings / Test Results:   · Imaging  · CT head:     No acute intracranial hemorrhage, midline shift, or mass effect  Incidental Findings:   · E  Coli urine culture    Test Results Pending at Discharge (will require follow up): · None     Outpatient Tests Requested:  · PT/INR, BMP  · PCP followup    Complications:  None    Reason for Admission: acute encephalopathy    Hospital Course:     Sean Pan is a 82 y o  female patient who originally presented to the hospital on 9/19/2020 due to acute encephalopathy  74-year-old female with history of psychosis with paranoia and visual hallucinations was admitted to our institution due to acute encephalopathy  She was recently admitted at the [facility] for management of this  On presentation, urinalysis was found to be positive for UTI, however she is asymptomatic and has no indication for antibiotic therapy for asymptomatic bacteriuria  CT head was negative for acute intracranial abnormalities  She is oriented x4  She states that she continues to have visual, but not auditory hallucinations  Psychiatry was consulted and given her recent admission they believe that she may benefit from additional medication management  Today, she denies any acute issues  The patient, initially admitted to the hospital as inpatient, was discharged earlier than expected given the following: Rapid resolution of symptoms for inpatient psych placement      Please see above list of diagnoses and related plan for additional information  Condition at Discharge: fair     Discharge Day Visit / Exam:     Subjective:  Patient seen examined at bedside  Comfortable  Denies any acute complaints  Vitals: Blood Pressure: 121/76 (09/21/20 1404)  Pulse: 72 (09/21/20 1404)  Temperature: 97 5 °F (36 4 °C) (09/21/20 1404)  Temp Source: Oral (09/19/20 2132)  Respirations: 18 (09/21/20 1404)  Weight - Scale: 87 9 kg (193 lb 12 6 oz) (09/19/20 2132)  SpO2: 97 % (09/21/20 1404)  Exam:   Physical Exam  Vitals signs reviewed  HENT:      Head: Normocephalic  Nose: Nose normal       Mouth/Throat:      Mouth: Mucous membranes are moist    Eyes:      General: No scleral icterus  Extraocular Movements: Extraocular movements intact  Pupils: Pupils are equal, round, and reactive to light  Neurological:      Mental Status: She is alert  Discussion with Family: Austin Campbell    Discharge instructions/Information to patient and family:   See after visit summary for information provided to patient and family  Provisions for Follow-Up Care:  See after visit summary for information related to follow-up care and any pertinent home health orders  Disposition:     Inpatient Psychiatry at [address] [place] [address]    For Discharges to [facility]:   · Not Applicable to this Patient - Not Applicable to this Patient    Planned Readmission: No     Discharge Statement:  I spent 40 minutes discharging the patient  This time was spent on the day of discharge  I had direct contact with the patient on the day of discharge  Greater than 50% of the total time was spent examining patient, answering all patient questions, arranging and discussing plan of care with patient as well as directly providing post-discharge instructions  Additional time then spent on discharge activities  Discharge Medications:  See after visit summary for reconciled discharge medications provided to patient and family  ** Please Note: This note has been constructed using a voice recognition system **

## 2020-09-21 NOTE — QUICK NOTE
No new acute issues  Glucose levels remain stable  Hallucinations similar to previous events as per patient  No further inpatient workup needed  May discharge to inpatient psych once bed is available

## 2020-09-21 NOTE — SOCIAL WORK
201 completed with pt being medically cleared per attending to go to [facility] this day  201 faxed to [facility] and Referral per process  Son and pt aware of anticipated d/c this day pending bed availability  Per son, they would be looking for LTC likely in [facility] at completion of pt's [facility] stay- Option paperwork completed and sent to [facility] to accompany pt to [facility] on d/c

## 2020-09-21 NOTE — ASSESSMENT & PLAN NOTE
82year old female presenting with psychosis with paranoia and visual hallucinations  CT-head not showing any acute abnormalities  Urinalysis with E  Coli BUT ASYMPTOMATIC  No indication to treat asymptomatic bacteriuria at this time  Symptoms similar to previous episode  - Discharge to behavioral health for medication optimization

## 2020-09-21 NOTE — UTILIZATION REVIEW
Initial Clinical Review    Admission: Date/Time/Statement:   Admission Orders (From admission, onward)     Ordered        09/20/20 0012  Inpatient Admission (expected length of stay for this patient Order details is greater than two midnights)  Once                   Orders Placed This Encounter   Procedures    Inpatient Admission (expected length of stay for this patient Order details is greater than two midnights)     Standing Status:   Standing     Number of Occurrences:   1     Order Specific Question:   Admitting Physician     Answer:   Phani Mensah [[id number]]     Order Specific Question:   Level of Care     Answer:   Med Surg [16]     Order Specific Question:   Estimated length of stay     Answer:   More than 2 Midnights     Order Specific Question:   Certification     Answer:   I certify that inpatient services are medically necessary for this patient for a duration of greater than two midnights  See H&P and MD Progress Notes for additional information about the patient's course of treatment  ED Arrival Information     Expected Arrival Acuity Means of Arrival Escorted By Service Admission Type    - 9/19/2020 21:27 Urgent Ambulance [facility]    Arrival Complaint    Medical Problem        Chief Complaint   Patient presents with    Altered Mental Status     per EMS newly diagnosed dimentia and hallucinations, per EMS pt was trying to get away from voices in her head followed a string of leia lights [facility] [place]  Assessment/Plan: 83 yo female,recently diagnosed with dementia and hallucinations presents to ED via EMS  Per EMS - Pt saw people partying @ her house and they wouldn't leave so she left  Dtr got concerned when she was unable to reach her Mom and called the police  She was found walking about a mile from her apartment  States she was following a strong of leia lights    Per daughter hallucinations of people partying her in house have been happening recently but this is the first time she has actually left the house  Findings:  K 3 2, Cr 1 41, INR 1 57  Urine: + nitrite, mod bacteria  CT Head negative  Pt was recently discharged 9/20 from [address] [place] [address] with similar presentation  Admitted to Inpatient with Acute Encephalopathy, R/O UTI, Hypokalemia  Plan: F/U on urine cx, abxs pending result  Continue abilify & Zoloft  Cr @ baseline  Replete K  Afib anticoagulated on warfarin, rate controlled with metoprolol  INR subtherapeutic 1 5, cannot verify medication compliance  Monitor INR  Continue asa & BB for CAD  Consult Psych   Monitor glucose and hold glimepiride - ? If related to hypoglycemia        ED Triage Vitals [09/19/20 2132]   Temperature Pulse Respirations Blood Pressure SpO2   97 9 °F (36 6 °C) 84 18 140/68 99 %      Temp Source Heart Rate Source Patient Position - Orthostatic VS BP Location FiO2 (%)   Oral Monitor Sitting Right arm --      Pain Score       4          Wt Readings from Last 1 Encounters:   09/19/20 87 9 kg (193 lb 12 6 oz)     Additional Vital Signs:   09/21/20 14:04:39   97 5 °F (36 4 °C)   72   18   121/76   91   97 %   --   --    09/21/20 06:59:16   98 °F (36 7 °C)   66   18   143/74   97   97 %   --   --    09/20/20 2230   --   --   --   --   --   --   None (Room air)   --    09/20/20 22:18:46   98 °F (36 7 °C)   65   18   108/61   77   97 %   --   --    09/20/20 20:59:05   97 9 °F (36 6 °C)   58   20   127/61   83   99 %   --   --    09/20/20 1436   97 6 °F (36 4 °C)   67   18   122/57   --   98 %   --   --    09/20/20 07:00:07   97 5 °F (36 4 °C)   61   16   108/56   73   97 %   --   --    09/20/20 0102   --   --   --   --   --   --   None (Room air)   --    09/20/20 00:44:57   97 7 °F (36 5 °C)   69   20   117/59   78   96 %   --   --    09/20/20 0015   --   67   16   96/44Abnormal      --   97 %   None (Room air)   Lying      Pertinent Labs/Diagnostic Test Results: Results from last 7 days   Lab Units 09/21/20  1254   SARS-COV-2  Negative     Results from last 7 days   Lab Units 09/21/20  0441 09/19/20  2216   WBC Thousand/uL 4 74 7 12   HEMOGLOBIN g/dL 10 4* 11 7   HEMATOCRIT % 33 3* 37 0   PLATELETS Thousands/uL 177 187   NEUTROS ABS Thousands/µL 2 80 5 67     Results from last 7 days   Lab Units 09/21/20  0441 09/19/20  2216   SODIUM mmol/L 141 139   POTASSIUM mmol/L 3 3* 3 2*   CHLORIDE mmol/L 106 103   CO2 mmol/L 28 32   ANION GAP mmol/L 7 4   BUN mg/dL 13 14   CREATININE mg/dL 1 19 1 41*   EGFR ml/min/1 73sq m 42 34   CALCIUM mg/dL 9 0 9 6     Results from last 7 days   Lab Units 09/21/20  1048 09/21/20  0708 09/20/20  2106 09/20/20  1643 09/20/20  1130 09/20/20  0733 09/20/20  0708 09/20/20  0134   POC GLUCOSE mg/dl 151* 85 116 110 78 87 64* 72     Results from last 7 days   Lab Units 09/21/20  0441 09/19/20  2216   GLUCOSE RANDOM mg/dL 88 111       Results from last 7 days   Lab Units 09/20/20  0438 09/19/20  2216   PROTIME seconds 18 7* 18 4*   INR  1 59* 1 57*     Results from last 7 days   Lab Units 09/19/20  2216   TSH 3RD GENERATON uIU/mL 1 723     Results from last 7 days   Lab Units 09/19/20  2240 09/19/20  2239   CLARITY UA   --  Cloudy   COLOR UA  yellow Yellow   SPEC GRAV UA   --  1 015   PH UA   --  5 5   GLUCOSE UA mg/dl  --  Negative   KETONES UA mg/dl  --  Negative   BLOOD UA   --  Negative   PROTEIN UA mg/dl  --  Negative   NITRITE UA   --  Positive*   BILIRUBIN UA   --  Negative   UROBILINOGEN UA E U /dl  --  0 2   LEUKOCYTES UA   --  Negative   WBC UA /hpf  --  None Seen   RBC UA /hpf  --  None Seen   BACTERIA UA /hpf  --  Moderate*   EPITHELIAL CELLS WET PREP /hpf  --  Occasional     Results from last 7 days   Lab Units 09/19/20  2239   URINE CULTURE  >100,000 cfu/ml Escherichia coli*     9/20 CT Head: No acute intracranial hemorrhage, midline shift, or mass effect       ED Treatment:   Medication Administration from 09/19/2020 2127 to 09/20/2020 1581       Date/Time Order Dose Route Action     09/19/2020 2229 acetaminophen (TYLENOL) tablet 975 mg 975 mg Oral Given        Past Medical History:   Diagnosis Date    Arthritis     Heart attack ([facility] )     High cholesterol     Hypertension      Present on Admission:   Acute encephalopathy   Hypokalemia   CKD (chronic kidney disease) stage 3, GFR 30-59 ml/min (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)   Acquired hypothyroidism      Admitting Diagnosis: Hallucinations [R44 3]  Altered mental status [R41 82]  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 ([place] [address] ) [F02 81, Z91 83]  Dementia ([facility]) [F03 90]  Chronic back pain [M54 9, G89 29]     Age/Sex: 82 y o  female  Admission Orders:  Scheduled Medications:  ARIPiprazole, 10 mg, Oral, Daily  aspirin, 81 mg, Oral, Daily  vitamin B-12, 1,000 mcg, Oral, Daily  famotidine, 10 mg, Oral, Daily  folic acid, 1 mg, Oral, Daily  furosemide, 20 mg, Oral, Daily  levothyroxine, 100 mcg, Oral, Early Morning  melatonin, 6 mg, Oral, HS  metoprolol tartrate, 25 mg, Oral, Q12H ZACK  potassium chloride, 20 mEq, Oral, Daily  sertraline, 50 mg, Oral, Daily  warfarin, 5 mg, Oral, Daily (warfarin)  potassium chloride (K-DUR,KLOR-CON) CR tablet 20 mEq   x 1 9/20 in addition to qd      Continuous IV Infusions:     PRN Meds:  acetaminophen, 650 mg, Oral, Q6H PRN  nitroglycerin, 0 4 mg, Sublingual, Q5 Min PRN  ondansetron, 4 mg, Intravenous, Q6H PRN      IP CONSULT TO CASE MANAGEMENT  IP CONSULT TO PSYCHIATRY    Network Utilization Review Department  [email or web address]  ATTENTION: Please call with any questions or concerns to [phone] and carefully listen to the prompts so that you are directed to the right person   All voicemails are confidential   Amisha Krause all requests for admission clinical reviews, approved or denied determinations and any other requests to dedicated fax number below belonging to the campus where the patient is receiving treatment   List of dedicated fax numbers for the Facilities:  [address] [place] [address] DENIALS (Administrative/Medical Necessity) [phone]   [address]  (Maternity/NICU/Pediatrics) [phone]   [facility] [phone]   Kurtis Elliott [phone]   Tatianna Balderas [phone]   Nany Poole [phone]   [address] [phone]   [facility]  [phone]   [address] [facility] [address] [facility]  [facility] [address] [facility] [phone]

## 2020-09-21 NOTE — PLAN OF CARE
Problem: Potential for Falls  Goal: Patient will remain free of falls  Description: INTERVENTIONS:  - Assess patient frequently for physical needs  -  Identify cognitive and physical deficits and behaviors that affect risk of falls  -  [place] fall precautions as indicated by assessment   - Educate patient/family on patient safety including physical limitations  - Instruct patient to call for assistance with activity based on assessment  - Modify environment to reduce risk of injury  - Consider OT/PT consult to assist with strengthening/mobility  Outcome: Progressing     Problem: Nutrition/Hydration-ADULT  Goal: Nutrient/Hydration intake appropriate for improving, restoring or maintaining nutritional needs  Description: Monitor and assess patient's nutrition/hydration status for malnutrition  Collaborate with interdisciplinary team and initiate plan and interventions as ordered  Monitor patient's weight and dietary intake as ordered or per policy  Utilize nutrition screening tool and intervene as necessary  Determine patient's food preferences and provide high-protein, high-caloric foods as appropriate       INTERVENTIONS:  - Monitor oral intake, urinary output, labs, and treatment plans  - Assess nutrition and hydration status and recommend course of action  - Evaluate amount of meals eaten  - Assist patient with eating if necessary   - Allow adequate time for meals  - Recommend/ encourage appropriate diets, oral nutritional supplements, and vitamin/mineral supplements  - Order, calculate, and assess calorie counts as needed  - Recommend, monitor, and adjust tube feedings and TPN/PPN based on assessed needs  - Assess need for intravenous fluids  - Provide specific nutrition/hydration education as appropriate  - Include patient/family/caregiver in decisions related to nutrition  Outcome: Progressing     Problem: SAFETY ADULT  Goal: Patient will remain free of falls  Description: INTERVENTIONS:  - Assess patient frequently for physical needs  -  Identify cognitive and physical deficits and behaviors that affect risk of falls    -  [place] fall precautions as indicated by assessment   - Educate patient/family on patient safety including physical limitations  - Instruct patient to call for assistance with activity based on assessment  - Modify environment to reduce risk of injury  - Consider OT/PT consult to assist with strengthening/mobility  Outcome: Progressing  Goal: Maintain or return to baseline ADL function  Description: INTERVENTIONS:  -  Assess patient's ability to carry out ADLs; assess patient's baseline for ADL function and identify physical deficits which impact ability to perform ADLs (bathing, care of mouth/teeth, toileting, grooming, dressing, etc )  - Assess/evaluate cause of self-care deficits   - Assess range of motion  - Assess patient's mobility; develop plan if impaired  - Assess patient's need for assistive devices and provide as appropriate  - Encourage maximum independence but intervene and supervise when necessary  - Involve family in performance of ADLs  - Assess for home care needs following discharge   - Consider OT consult to assist with ADL evaluation and planning for discharge  - Provide patient education as appropriate  Outcome: Progressing  Goal: Maintain or return mobility status to optimal level  Description: INTERVENTIONS:  - Assess patient's baseline mobility status (ambulation, transfers, stairs, etc )    - Identify cognitive and physical deficits and behaviors that affect mobility  - Identify mobility aids required to assist with transfers and/or ambulation (gait belt, sit-to-stand, lift, walker, cane, etc )  - [place] fall precautions as indicated by assessment  - Record patient progress and toleration of activity level on Mobility SBAR; progress patient to next Phase/Stage  - Instruct patient to call for assistance with activity based on assessment  - Consider rehabilitation consult to assist with strengthening/weightbearing, etc   Outcome: Progressing     Problem: DISCHARGE PLANNING  Goal: Discharge to home or other facility with appropriate resources  Description: INTERVENTIONS:  - Identify barriers to discharge w/patient and caregiver  - Arrange for needed discharge resources and transportation as appropriate  - Identify discharge learning needs (meds, wound care, etc )  - Arrange for interpretive services to assist at discharge as needed  - Refer to Case Management Department for coordinating discharge planning if the patient needs post-hospital services based on physician/advanced practitioner order or complex needs related to functional status, cognitive ability, or social support system  Outcome: Progressing     Problem: Knowledge Deficit  Goal: Patient/family/caregiver demonstrates understanding of disease process, treatment plan, medications, and discharge instructions  Description: Complete learning assessment and assess knowledge base    Interventions:  - Provide teaching at level of understanding  - Provide teaching via preferred learning methods  Outcome: Progressing

## 2020-09-21 NOTE — ASSESSMENT & PLAN NOTE
Continue Warfarin 4mg for now  Repeat INR c/o receiving institution  Goal INR 2-3  · - Outpatient PCP followup within a week if needed if further coumadin dosing needs to be adjusted

## 2020-09-21 NOTE — ASSESSMENT & PLAN NOTE
At baseline    Recent Labs     09/19/20  2216 09/21/20  0441   BUN 14 13   CREATININE 1 41* 1 19   EGFR 34 42       Results from last 7 days   Lab Units 09/19/20  2239   BLOOD UA  Negative   PROTEIN UA mg/dl Negative

## 2020-09-21 NOTE — SOCIAL WORK
Notified by psychiatry of pt need for [facility]- is to have a 201 signed  Physician to call pt's daughter to inform of same  Attending notified of psychiatry recommendation  Will continue to follow to assist with dc poc

## 2020-09-21 NOTE — OCCUPATIONAL THERAPY NOTE
Occupational Therapy Evaluation(evaluation qmik=[id number], tx time=0920-0940)     Patient Name: Geri Holguin  DAHGJ'D Date: 9/21/2020  Problem List  Principal Problem:    Acute encephalopathy  Active Problems:    Diabetes mellitus type 2, noninsulin dependent ([facility] )    Acquired hypothyroidism    Coronary artery disease involving native coronary artery of native heart without angina pectoris    Paroxysmal atrial fibrillation (HCC)    Ischemic cardiomyopathy    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HCC)    CKD (chronic kidney disease) stage 3, GFR 30-59 ml/min (HCC)    Hypokalemia    Past Medical History  Past Medical History:   Diagnosis Date    Arthritis     Heart attack ([facility] )     High cholesterol     Hypertension      Past Surgical History  Past Surgical History:   Procedure Laterality Date    ABDOMINAL SURGERY      cholecystectomy     CARDIAC SURGERY      bypass April 2000    HYSTERECTOMY           09/21/20 0940   Note Type   Note type Eval only   Restrictions/Precautions   Weight Bearing Precautions Per Order No   Other Precautions Chair Alarm; Bed Alarm; Fall Risk;Cognitive   Pain Assessment   Pain Assessment Tool Pain Assessment not indicated - pt denies pain   Pain Score No Pain   Home Living   Type of Home Apartment  ([address]   Home Layout One level  ([address])   Bathroom Shower/Tub Tub/shower unit   Bathroom Toilet Raised   Bathroom Equipment Grab bars in shower; Tub transfer bench;Commode   Home Equipment Walker;Cane   Prior Function   Lives With Alone   ADL Assistance Independent   Falls in the last 6 months 1 to 4  (2)   Comments ambulates with RW(outside), SPC(inside); neg    Lifestyle   Autonomy PTA pt states independence with all aspects of her ADLs, transfers, ambulation--with RW(outside), SPC(inside); +falls=2, neg    Reciprocal Relationships 2 children   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   Intrinsic Gratification watching TV   Psychosocial   Psychosocial (WDL) WDL Subjective   Subjective "I don't think my kids are going to let me live alone "   ADL   Where Assessed Chair   Eating Assistance 6  Modified independent   Grooming Assistance 6  Modified Independent   UB Bathing Assistance 5  Supervision/Setup   LB Bathing Assistance 4  Minimal Assistance   UB Dressing Assistance 5  Supervision/Setup   LB Dressing Assistance 4  Minimal Assistance   Bed Mobility   Supine to Sit 5  Supervision   Additional items HOB elevated; Bedrails;Verbal cues   Transfers   Sit to Stand 5  Supervision   Additional items Armrests; Increased time required   Stand to Sit 5  Supervision   Additional items Armrests; Increased time required;Verbal cues   Functional Mobility   Functional Mobility 5  Supervision   Additional items Rolling walker   Balance   Static Sitting Good   Dynamic Sitting Fair +   Static Standing Fair -   Dynamic Standing Poor +   Activity Tolerance   Activity Tolerance Patient limited by fatigue   Medical Staff Made Aware nsg, P T     RUE Assessment   RUE Assessment WFL   RUE Strength   RUE Overall Strength Within Functional Limits - able to perform ADL tasks with strength  (4/5 throughout)   LUE Assessment   LUE Assessment WFL   LUE Strength   LUE Overall Strength Within Functional Limits - able to perform ADL tasks with strength  (4/5 throughout)   Hand Function   Gross Motor Coordination Functional   Fine Motor Coordination Functional   Sensation   Light Touch No apparent deficits   Proprioception   Proprioception No apparent deficits   Vision-Basic Assessment   Current Vision   (glasses)   Vision - Complex Assessment   Acuity Able to read clock/calendar on wall without difficulty   Perception   Inattention/Neglect Appears intact   Cognition   Overall Cognitive Status Impaired   Arousal/Participation Alert   Attention Attends with cues to redirect   Orientation Level Oriented X4   Memory Decreased short term memory;Decreased recall of precautions   Following Commands Follows one step commands with increased time or repetition   Comments hx psychosis with paranoia   Assessment   Limitation Decreased ADL status; Decreased UE strength;Decreased Safe judgement during ADL;Decreased cognition;Decreased endurance;Decreased high-level ADLs   Prognosis Fair   Assessment Pt is a 81y/o female admitted to the hospital 2* symptoms of visual hallucinations  Pt note with acute encephalopathy  Pt with PMH CABG, MI, HTN, and psychosis with paranoia and visual hallucinations  PTA pt states independence with all aspects of her ADLs, transfers, ambulation--with RW(outside), SPC(inside); +falls=2, neg   During initial eval, pt demonstrated slight deficits with her functional balance, functional mobility, ADL status, activity tolerance(currently fair=15-20mins), transfer safety, b/l UE strength, and cognition(i e memory, problem-solving)  Pt would benefit from continued OT tx for the above deficits  3-5xwk/1-2wks  Goals   Patient Goals "to get better"   STG Time Frame   (1-7 days)   Short Term Goal #1 Pt will demonstrate improved activity tolerance to good(20-30mins) and standing tolerance to 3-5mins to assist with ADLs  Short Term Goal #2 Pt will tolerate continued cognitive/home-safety assessment and appropriate d/c recommendations will be provided  Short Term Goal  Pt will demonstrate proper walker/transfer safety 100% of the time  LTG Time Frame   (7-14days)   Long Term Goal #1 Pt will demonstrate g/g- balance with all functional activities  Long Term Goal #2 Pt will demonstrate mod I with their UE and LE bathing/dresssing  Long Term Goal Pt will demonstrate improved b/l UE strength by 1/2 MM grade to assist with ADLs/transfers  Plan   Treatment Interventions ADL retraining;Functional transfer training;UE strengthening/ROM; Cognitive reorientation; Endurance training;Patient/family training;Equipment evaluation/education; Compensatory technique education;Continued evaluation   Goal Expiration Date 10/05/20   OT Treatment Day 0   OT Frequency 3-5x/wk   Additional Treatment Session   Start Time 0920   End Time 0940   Treatment Assessment Pt seen for 20 min tx session with focus on cognition  Pt completed the ACLS with a score of 4 0  Pt demonstrated cognitive deficits with her attention, memory, direction-following, and problem-solving  Based on cognitive screen, pt would currently best benefit from a 24hr supervised environment 2* home-safety/cognitive concerns  See below noted for 4 0 score recommendations  Pt pleasant and cooperative with tx session  Additional Treatment Day 1   Recommendation   OT Discharge Recommendation   (24 hr supervision; continue PT/OT)   OT - OK to Discharge Yes   Barthel Index   Feeding 10   Bathing 0   Grooming Score 5   Dressing Score 5   Bladder Score 10   Bowels Score 10   Toilet Use Score 5   Transfers (Bed/Chair) Score 10   Mobility (Level Surface) Score 0   Stairs Score 0   Barthel Index Score 55   4 0    Administered Artie Cognitive Level Screen (ACLS)  Pt scored 4 0/6 0 indicating 24 hour supervision is recommended to remove dangerous objects and solve problems due to minor changes in routine activities  Behavior:  May be disoriented to day/date  Memorization of new tasks will be extremely slow  Long term repetitive training is required for all new tasks  Allow 2-3x average rate for completion of tasks  Recognizes only 1 way to complete a task  Asks for A but does not identify true problems  Grooming:  Initiates and completes a familiar sequence of actions when all necessary tools area available and within reach  May not initiate at the correct time of day consistently  May miss sides of head when comping or shaving  Expect cuts if using straight razor  Restrict access to dangerous objects  Check every few minutes if left alone  Dressing:  Initiates dressing at customary time of day    May select cloting but does not consider temperature, season or occasion  Dons items slowly and correctly but cannot alter rate of dressing  May have difficulty with fasteners  May fail to notice errors out of visual field (shirt not tucked in in back)  Bathing:  Recognizes need for a bath but may initiate at inappropriate times  Moves slowly through familiar sequence when items are available  May not ask for help with a problem  May not notice wet floors  Watch for floods/falls  Walking/Exercising:  May ask to be led to a new environment or may resist going to unfamiliar places  May not note changes in terrain or stairs and may trip  May be confused with changes in landmarks  May become anxious in high stimulus environments (airports, malls, casinos)  Eating:  Initiates coming to the table at routine times  Uses common utensils in customary fashion  Does not notice crumbs or spills  May be clumsy with utensils  Unable to eat and talk at same time but is aware at others at the table  May not check food temperature of remember restrictions  Monitor compliance with dietary restrictions  Toileting:  Initiate toileting activities  May need supervision to check results of wiping  May not adjust garments in the back  May use excessive toilet paper  May need to be escorted to public rest rooms  May take much longer than average to complete tasks  Medications:  Initiates taking familiar dose of medication at regular time of day  Recognizes prescriptions and may recognize a problem, but will not seek assistance  May not understand need or purpose of medications  Changes in routine will confuse the individual and disrupt compliance  Provide frequent supervision  Use of adaptive devices:   May accept adaptive devices that use familiar actions and sequences  May forget to use cane, walker  Housekeeping:  Initiates a well learned housekeeping routine when supplies are accessible  Asks for next step in unfamiliar tasks    Check quality of cleaning results  Food Preparation:  Does not plan for food  May make unreasonable, unhealthy requests  May not understand balanced diet  May prepare a simple cold meal like a sandwich  May cut or burn self when using hot or sharp equipment  Restrict access or supervise all electrical or gas appliances  Store undesirable equipment away from view  Spending money:  Completes a simple monetary transaction for a small purchase without assistance  Does not know price of most goods and services  Shopping:  May walk a short distance to a familiar shop to purchase items  Completes actions very slowly  Generally does not anticipate cost, compare prices or adhere to a budget  May not ask if items are not in usual location  Does not know prices of most goods and services  May resist assistance with shopping  May become highly anxious in shopping malls  May be able to tolerate short trips only  Laundry:  Initiates a request for clean clothing or places dirty clothes in a hamper  May not sort or measure soap  May  not differentiate between dry clean, hand or machine wash  Supervise use of washing machine  Traveling:  May initiate traveling to familiar place by familiar means (bus)  May become easily confused at environmental changes  May resist going to unfamiliar locations or altering familiar means of transportation  May not tolerate travel for more than 1 hour  Telephone:  May be able to use a public telephone to call a familiar number  If a problem occurs may not ask for help  May call emergency or familiar numbers excessively  May ignore time of day when calling  Driving: Should not operate a motor vehicle        Mayuri Torrez, OT

## 2020-09-21 NOTE — CONSULTS
Consultation - Behavioral Health     Identification Data: Quique Vera 82 y o  female MRN: [id number]  Unit/Bed#: [facility] 2 [facility] [address] [id number] Encounter: [id number]    09/21/20  10:23 AM    Inpatient consult to Psychiatry  Consult performed by: KEIKO Moss  Consult ordered by: Iris Boo MD      Chief Complaint: " I am having a problem with seeing people at my house"    History of Present Illness     Physician Requesting Consult: Tish Muñoz MD  Reason for Consult / Principal Problem:Acute encephalopathy    Patient is a 82 y o  female with a history of paranoia and dementia  Patient was recently discharged on Sept 10, 2020 for the [facility] with same presentation  Per ED notes, patient reports that she was at home and there were a bunch of people there that no one else can see; she wants people to go but they didn't leave so she went for a long walk with the intention of visiting her sister  Patient states that there were also cars following her  Patient has medical history of CKD, DM2, Hypokalemia, Paroxysmal atrial fibrillation, CAD s/p CABG on ASA and beta-blocker  Psychiatric service was asked to evaluate the patient due to auditory hallucinations, visual hallucinations and paranoid ideation  Current psychiatric symptoms include auditory hallucinations, visual hallucinations and paranoid ideation  Onset of symptoms was abrupt starting 7 days ago with gradually worsening course since that time  Patient's stressors included health issues, medical problems and lonliness      Psychiatric Review Of Systems:    sleep: yes, "I only sleep for 2 hours/night"  appetite changes: no  weight changes: no  energy/anergy: no  interest/pleasure/anhedonia: no  somatic symptoms: no  anxiety/panic: yes, "when those women won't leave my house"  gab: no  guilty/hopeless: no  self injurious behavior/risky behavior: no  Suicidal ideation: no  Homicidal ideation: no  Auditory hallucinations: yes, "I see women in my living room"  Visual hallucinations: yes, "women sometimes asked me for stuff"  Other hallucinations: no  Delusional thinking: no  Paranoid: yes, "I don't want to go home, because they are waiting for me"  Eating disorder history: no  Obsessive/compusive symptoms: no      Historical Information     Past Psychiatric History:     Past Inpatient Psychiatric Treatment: Past inpatient psychiatric admissions at [facility] in 8/2020; recent d/c on 9/10/20  Past Outpatient Psychiatric Treatment:  No history of past outpatient psychiatric treatment  Pt states she sees her PCP for medications  Past Suicide Attempts: no  Past Violent Behavior: no  Past Psychiatric Medication Trials: Abilify, Zoloft, Melatonin    Substance Abuse History:    Alcohol use: denies    Recreational drug use:   denies   History of Inpatient/Outpatient rehabilitation program: no  Smoking history: denies use  Use of caffeine: denies use    Family Psychiatric History:     Psychiatric Illness:  no family history of psychiatric illness  Substance Abuse: no family history of substance abuse  Suicide Attempt: no family history of suicide attempts    Social History:  Marital History:   Living Arrangement, social support: The patient lives alone  Occupational History: retired  Functioning Relationships: good support system  Legal History: none   History: None    Traumatic History:     Abuse: none  Other Traumatic Events:none     Past Medical History:    History of Seizures: no  History of Head injury with loss of consciousness: no  Surgical History:    Past Medical History:   Diagnosis Date    Arthritis     Heart attack ([facility] [address] )     High cholesterol     Hypertension        Medical Review Of Systems:    Pertinent items are noted in HPI      Meds/Allergies     all current active meds have been reviewed and current meds:   Current Facility-Administered Medications   Medication Dose Route Frequency    acetaminophen (TYLENOL) tablet 650 mg  650 mg Oral Q6H PRN    ARIPiprazole (ABILIFY) tablet 10 mg  10 mg Oral Daily    aspirin (ECOTRIN LOW STRENGTH) EC tablet 81 mg  81 mg Oral Daily    cyanocobalamin (VITAMIN B-12) tablet 1,000 mcg  1,000 mcg Oral Daily    famotidine (PEPCID) tablet 10 mg  10 mg Oral Daily    folic acid (FOLVITE) tablet 1 mg  1 mg Oral Daily    furosemide (LASIX) tablet 20 mg  20 mg Oral Daily    levothyroxine tablet 100 mcg  100 mcg Oral Early Morning    melatonin tablet 6 mg  6 mg Oral HS    metoprolol tartrate (LOPRESSOR) tablet 25 mg  25 mg Oral Q12H [address]    nitroglycerin (NITROSTAT) SL tablet 0 4 mg  0 4 mg Sublingual Q5 Min PRN    ondansetron (ZOFRAN) injection 4 mg  4 mg Intravenous Q6H PRN    potassium chloride (K-DUR,KLOR-CON) CR tablet 20 mEq  20 mEq Oral Daily    sertraline (ZOLOFT) tablet 50 mg  50 mg Oral Daily    warfarin (COUMADIN) tablet 5 mg  5 mg Oral Daily (warfarin)       Allergies   Allergen Reactions    Shellfish-Derived Products Anaphylaxis    Adhesive [Medical Tape]     Amoxicillin     Ancef [Cefazolin]     Cephalosporins     Dofetilide      Cannot take generic    Epinephrine     Erythromycin     Iodine     Levofloxacin     Losartan     Morphine     Other      seafood    Oxycodone     Penicillins     Percocet [Oxycodone-Acetaminophen]     Pineapple     Thorazine [Chlorpromazine]        Objective     Vital signs in last 24 hours:    Temp:  [97 6 °F (36 4 °C)-98 °F (36 7 °C)] 98 °F (36 7 °C)  HR:  [58-67] 66  Resp:  [18-20] 18  BP: (108-143)/(57-74) 143/74    No intake or output data in the 24 hours ending 09/21/20 1023    Mental Status Evaluation:    Appearance:  casually dressed, wearing hospital clothes, looks stated age, overweight   Behavior:  pleasant, cooperative, calm   Speech:  normal rate and volume   Mood:  anxious, worried   Affect:  reactive, mood-congruent   Language: naming objects   Thought Process:  logical, linear   Thought Content: paranoid ideation, intrusive thoughts   Perceptual Disturbances: auditory hallucinations of voices of "women & people", visual hallucinations of "people & women"   Risk Potential: Suicidal ideation - None  Homicidal ideation - None  Potential for aggression - No   Sensorium:  person, place, time/date and situation   Cognition:  recent and remote memory grossly intact   Consciousness:  alert and awake    Attention: attention span and concentration are age appropriate   Intellect: normal   Fund of Knowledge: awareness of current events: knows current president of the [place] and current events   Insight:  poor   Judgment: poor   Muscle Strength Muscle Tone: normal  normal   Gait/Station: uses walker   Motor Activity: no abnormal movements       Laboratory Results:  I have personally reviewed all laboratory results      Admission on 09/19/2020   Component Date Value    WBC 09/19/2020 7 12     RBC 09/19/2020 3 65*    Hemoglobin 09/19/2020 11 7     Hematocrit 09/19/2020 37 0     MCV 09/19/2020 101*    MCH 09/19/2020 32 1     MCHC 09/19/2020 31 6     RDW 09/19/2020 13 0     MPV 09/19/2020 9 8     Platelets 29/84/4172 187     nRBC 09/19/2020 0     Neutrophils Relative 09/19/2020 80*    Immat GRANS % 09/19/2020 1     Lymphocytes Relative 09/19/2020 12*    Monocytes Relative 09/19/2020 6     Eosinophils Relative 09/19/2020 1     Basophils Relative 09/19/2020 0     Neutrophils Absolute 09/19/2020 5 67     Immature Grans Absolute 09/19/2020 0 04     Lymphocytes Absolute 09/19/2020 0 85     Monocytes Absolute 09/19/2020 0 44     Eosinophils Absolute 09/19/2020 0 09     Basophils Absolute 09/19/2020 0 03     Sodium 09/19/2020 139     Potassium 09/19/2020 3 2*    Chloride 09/19/2020 103     CO2 09/19/2020 32     ANION GAP 09/19/2020 4     BUN 09/19/2020 14     Creatinine 09/19/2020 1 41*    Glucose 09/19/2020 111     Calcium 09/19/2020 9 6     eGFR 09/19/2020 34     Protime 09/19/2020 18 4*    INR 09/19/2020 1 57*    Color, UA 09/19/2020 yellow     TSH 3RD GENERATON 09/19/2020 1 723     Color, UA 09/19/2020 Yellow     Clarity, UA 09/19/2020 Cloudy     pH, UA 09/19/2020 5 5     Leukocytes, UA 09/19/2020 Negative     Nitrite, UA 09/19/2020 Positive*    Protein, UA 09/19/2020 Negative     Glucose, UA 09/19/2020 Negative     Ketones, UA 09/19/2020 Negative     Urobilinogen, UA 09/19/2020 0 2     Bilirubin, UA 09/19/2020 Negative     Blood, UA 09/19/2020 Negative     Specific Gravity, UA 09/19/2020 1 015     RBC, UA 09/19/2020 None Seen     WBC, UA 09/19/2020 None Seen     Epithelial Cells 09/19/2020 Occasional     Bacteria, UA 09/19/2020 Moderate*    Urine Culture 09/19/2020 >100,000 cfu/ml Gram Negative Roni Enteric Like*    POC Glucose 09/20/2020 72     Protime 09/20/2020 18 7*    INR 09/20/2020 1 59*    POC Glucose 09/20/2020 64*    POC Glucose 09/20/2020 78     POC Glucose 09/20/2020 87     POC Glucose 09/20/2020 110     POC Glucose 09/20/2020 116     Sodium 09/21/2020 141     Potassium 09/21/2020 3 3*    Chloride 09/21/2020 106     CO2 09/21/2020 28     ANION GAP 09/21/2020 7     BUN 09/21/2020 13     Creatinine 09/21/2020 1 19     Glucose 09/21/2020 88     Calcium 09/21/2020 9 0     eGFR 09/21/2020 42     WBC 09/21/2020 4 74     RBC 09/21/2020 3 24*    Hemoglobin 09/21/2020 10 4*    Hematocrit 09/21/2020 33 3*    MCV 09/21/2020 103*    MCH 09/21/2020 32 1     MCHC 09/21/2020 31 2*    RDW 09/21/2020 13 2     MPV 09/21/2020 10 3     Platelets 70/49/2637 177     nRBC 09/21/2020 0     Neutrophils Relative 09/21/2020 58     Immat GRANS % 09/21/2020 0     Lymphocytes Relative 09/21/2020 28     Monocytes Relative 09/21/2020 10     Eosinophils Relative 09/21/2020 3     Basophils Relative 09/21/2020 1     Neutrophils Absolute 09/21/2020 2 80     Immature Grans Absolute 09/21/2020 0 01     Lymphocytes Absolute 09/21/2020 1 32     Monocytes Absolute 09/21/2020 0 46     Eosinophils Absolute 09/21/2020 0 12     Basophils Absolute 09/21/2020 0 03     POC Glucose 09/21/2020 85        Imaging Studies:    EKG, Pathology, and Other Studies:  All pertinent studies reviewed    Code Status: )Level 1 - Full Code    Assessment/Plan     Principal Problem:    Acute encephalopathy  Active Problems:    Diabetes mellitus type 2, noninsulin dependent ([facility] [address] )    Acquired hypothyroidism    Coronary artery disease involving native coronary artery of native heart without angina pectoris    Paroxysmal atrial fibrillation (HCC)    Ischemic cardiomyopathy    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HCC)    CKD (chronic kidney disease) stage 3, GFR 30-59 ml/min (HCC)    Hypokalemia      Diagnosis: Atypical Psychosis (F29 0)    Assessment:    On assessment today, patient was alert and oriented x4; states she was back in the hospital because "I am still seeing those people and women in my living room"  Patient states that hallucinations came back as soon as she went home after being discharged for [facility] on 9/10/2020  Patient states that she was seeing only "women" sometimes they are sitting in her living room and around the table playing cards  Patient states that these women sometimes asked her for a drink of water  Patient reports that "I am good from in the morning until about 7pm, then they come into my house and when I asked them to leave they won't"  Patient also reports seeing people in her room that was not there since being hospitalized  Patient states that she lives alone and was afraid to go home - because "they are waiting for me"  Patient denies suicidal ideation and denies delusions  Patient reports being medication compliant since discharged on 9/10/20; taking Abilify 10 mg/daily and Zoloft 50 mg/daily  At this point, patient meets criteria for voluntary inpatient psych admission for symptoms management and medication adjustment and she has agreed to sign 201 - voluntary form  Plan/Medication changes:     1  Patient met criteria for voluntary inpatient psychiatric Admission to the [facility] and has agreed/signed 201-voluntary form  2  Recommend continue on current psych medications  3  Patient can be transferred to [facility] when medically cleared    Planned medication changes:      Risks, benefits, and possible side effects of medications explained to patient and patient verbalizes understanding and agreement for treatment  Counseling / Coordination of Care    Total floor / unit time spent today 30 minutes  Greater than 50% of total time was spent with the patient and / or family counseling and / or coordination of care  A description of the counseling / coordination of care:    Patient's presentation on admission and proposed treatment plan discussed with treatment team   Events leading to admission reviewed with patient  Importance of medication and treatment compliance reviewed with patient        KEIKO Barker

## 2020-09-21 NOTE — SOCIAL WORK
Transportation arranged via [address] for [address]   Crisis worker/RN/PT and pt's family made aware of same  CMN completed, sent to [facility] and in dc folder  No further dc needs identified

## 2020-09-22 PROBLEM — Z86.79 HISTORY OF CHF (CONGESTIVE HEART FAILURE): Status: ACTIVE | Noted: 2020-09-22

## 2020-09-22 PROBLEM — Z00.8 MEDICAL CLEARANCE FOR PSYCHIATRIC ADMISSION: Status: ACTIVE | Noted: 2020-09-22

## 2020-09-22 PROBLEM — F03.90: Status: ACTIVE | Noted: 2020-09-01

## 2020-09-22 PROBLEM — K21.9 GERD (GASTROESOPHAGEAL REFLUX DISEASE): Status: ACTIVE | Noted: 2020-09-22

## 2020-09-22 LAB
BACTERIA UR CULT: ABNORMAL
GLUCOSE SERPL-MCNC: 106 MG/DL (ref 65–140)
GLUCOSE SERPL-MCNC: 110 MG/DL (ref 65–140)
GLUCOSE SERPL-MCNC: 113 MG/DL (ref 65–140)
GLUCOSE SERPL-MCNC: 85 MG/DL (ref 65–140)

## 2020-09-22 PROCEDURE — 99253 IP/OBS CNSLTJ NEW/EST LOW 45: CPT | Performed by: INTERNAL MEDICINE

## 2020-09-22 PROCEDURE — NC001 PR NO CHARGE: Performed by: PSYCHIATRY & NEUROLOGY

## 2020-09-22 PROCEDURE — 99253 IP/OBS CNSLTJ NEW/EST LOW 45: CPT | Performed by: PHYSICIAN ASSISTANT

## 2020-09-22 PROCEDURE — 93005 ELECTROCARDIOGRAM TRACING: CPT

## 2020-09-22 PROCEDURE — 99222 1ST HOSP IP/OBS MODERATE 55: CPT | Performed by: PSYCHIATRY & NEUROLOGY

## 2020-09-22 PROCEDURE — 82948 REAGENT STRIP/BLOOD GLUCOSE: CPT

## 2020-09-22 RX ORDER — RIVASTIGMINE TARTRATE 1.5 MG/1
1.5 CAPSULE ORAL
Status: COMPLETED | OUTPATIENT
Start: 2020-09-22 | End: 2020-09-22

## 2020-09-22 RX ORDER — RIVASTIGMINE 4.6 MG/24H
4.6 PATCH, EXTENDED RELEASE TRANSDERMAL DAILY
Status: DISCONTINUED | OUTPATIENT
Start: 2020-09-23 | End: 2020-10-16 | Stop reason: HOSPADM

## 2020-09-22 RX ORDER — ONDANSETRON 4 MG/1
4 TABLET, ORALLY DISINTEGRATING ORAL EVERY 6 HOURS PRN
Status: DISCONTINUED | OUTPATIENT
Start: 2020-09-22 | End: 2020-10-16 | Stop reason: HOSPADM

## 2020-09-22 RX ADMIN — RIVASTIGMINE TARTRATE 1.5 MG: 1.5 CAPSULE ORAL at 17:29

## 2020-09-22 RX ADMIN — ARIPIPRAZOLE 10 MG: 10 TABLET ORAL at 08:38

## 2020-09-22 RX ADMIN — FAMOTIDINE 10 MG: 20 TABLET ORAL at 08:37

## 2020-09-22 RX ADMIN — WARFARIN SODIUM 4 MG: 2 TABLET ORAL at 17:24

## 2020-09-22 RX ADMIN — MELATONIN TAB 3 MG 6 MG: 3 TAB at 21:08

## 2020-09-22 RX ADMIN — SERTRALINE HYDROCHLORIDE 50 MG: 50 TABLET ORAL at 08:34

## 2020-09-22 RX ADMIN — LEVOTHYROXINE SODIUM 100 MCG: 100 TABLET ORAL at 05:56

## 2020-09-22 RX ADMIN — METOPROLOL TARTRATE 25 MG: 25 TABLET, FILM COATED ORAL at 21:08

## 2020-09-22 RX ADMIN — FOLIC ACID 1 MG: 1 TABLET ORAL at 08:38

## 2020-09-22 RX ADMIN — CYANOCOBALAMIN TAB 1000 MCG 1000 MCG: 1000 TAB at 08:37

## 2020-09-22 RX ADMIN — ASPIRIN 81 MG: 81 TABLET, COATED ORAL at 08:38

## 2020-09-22 RX ADMIN — FUROSEMIDE 20 MG: 20 TABLET ORAL at 08:38

## 2020-09-22 RX ADMIN — POTASSIUM CHLORIDE 20 MEQ: 20 TABLET, EXTENDED RELEASE ORAL at 08:34

## 2020-09-22 NOTE — NURSING NOTE
Report was called to [facility] at [address]  All belongings were sent with transport team   IV was removed

## 2020-09-23 ENCOUNTER — APPOINTMENT (INPATIENT)
Dept: NON INVASIVE DIAGNOSTICS | Facility: HOSPITAL | Age: 85
DRG: 057 | End: 2020-09-23
Payer: COMMERCIAL

## 2020-09-23 LAB
ANION GAP SERPL CALCULATED.3IONS-SCNC: 9 MMOL/L (ref 5–14)
ATRIAL RATE: 32 BPM
BUN SERPL-MCNC: 19 MG/DL (ref 5–25)
CALCIUM SERPL-MCNC: 9.9 MG/DL (ref 8.4–10.2)
CHLORIDE SERPL-SCNC: 103 MMOL/L (ref 97–108)
CO2 SERPL-SCNC: 25 MMOL/L (ref 22–30)
CREAT SERPL-MCNC: 1.14 MG/DL (ref 0.6–1.2)
ERYTHROCYTE [DISTWIDTH] IN BLOOD BY AUTOMATED COUNT: 13.8 %
GFR SERPL CREATININE-BSD FRML MDRD: 45 ML/MIN/1.73SQ M
GLUCOSE P FAST SERPL-MCNC: 99 MG/DL (ref 70–99)
GLUCOSE SERPL-MCNC: 104 MG/DL (ref 65–140)
GLUCOSE SERPL-MCNC: 110 MG/DL (ref 65–140)
GLUCOSE SERPL-MCNC: 115 MG/DL (ref 65–140)
GLUCOSE SERPL-MCNC: 126 MG/DL (ref 65–140)
GLUCOSE SERPL-MCNC: 99 MG/DL (ref 70–99)
HCT VFR BLD AUTO: 37.2 % (ref 36–46)
HGB BLD-MCNC: 12.3 G/DL (ref 12–16)
INR PPP: 1.47 (ref 0.84–1.19)
MCH RBC QN AUTO: 32.2 PG (ref 26–34)
MCHC RBC AUTO-ENTMCNC: 32.9 G/DL (ref 31–36)
MCV RBC AUTO: 98 FL (ref 80–100)
PLATELET # BLD AUTO: 214 THOUSANDS/UL (ref 150–450)
PMV BLD AUTO: 8.7 FL (ref 8.9–12.7)
POTASSIUM SERPL-SCNC: 4.5 MMOL/L (ref 3.6–5)
PROTHROMBIN TIME: 18 SECONDS (ref 11.6–14.5)
QRS AXIS: -58 DEGREES
QRSD INTERVAL: 158 MS
QT INTERVAL: 456 MS
QTC INTERVAL: 451 MS
RBC # BLD AUTO: 3.8 MILLION/UL (ref 4–5.2)
SODIUM SERPL-SCNC: 137 MMOL/L (ref 137–147)
T WAVE AXIS: -29 DEGREES
VENTRICULAR RATE: 59 BPM
WBC # BLD AUTO: 5.4 THOUSAND/UL (ref 4.5–11)

## 2020-09-23 PROCEDURE — 82948 REAGENT STRIP/BLOOD GLUCOSE: CPT

## 2020-09-23 PROCEDURE — 80048 BASIC METABOLIC PNL TOTAL CA: CPT | Performed by: PHYSICIAN ASSISTANT

## 2020-09-23 PROCEDURE — NC001 PR NO CHARGE: Performed by: PSYCHIATRY & NEUROLOGY

## 2020-09-23 PROCEDURE — 99232 SBSQ HOSP IP/OBS MODERATE 35: CPT | Performed by: PSYCHIATRY & NEUROLOGY

## 2020-09-23 PROCEDURE — 85610 PROTHROMBIN TIME: CPT | Performed by: PHYSICIAN ASSISTANT

## 2020-09-23 PROCEDURE — 93306 TTE W/DOPPLER COMPLETE: CPT | Performed by: INTERNAL MEDICINE

## 2020-09-23 PROCEDURE — 93010 ELECTROCARDIOGRAM REPORT: CPT | Performed by: INTERNAL MEDICINE

## 2020-09-23 PROCEDURE — 93306 TTE W/DOPPLER COMPLETE: CPT

## 2020-09-23 PROCEDURE — 85027 COMPLETE CBC AUTOMATED: CPT | Performed by: PHYSICIAN ASSISTANT

## 2020-09-23 RX ADMIN — ASPIRIN 81 MG: 81 TABLET, COATED ORAL at 08:09

## 2020-09-23 RX ADMIN — FUROSEMIDE 20 MG: 20 TABLET ORAL at 08:09

## 2020-09-23 RX ADMIN — METOPROLOL TARTRATE 25 MG: 25 TABLET, FILM COATED ORAL at 08:09

## 2020-09-23 RX ADMIN — FOLIC ACID 1 MG: 1 TABLET ORAL at 08:09

## 2020-09-23 RX ADMIN — CYANOCOBALAMIN TAB 1000 MCG 1000 MCG: 1000 TAB at 08:09

## 2020-09-23 RX ADMIN — FAMOTIDINE 10 MG: 20 TABLET ORAL at 08:09

## 2020-09-23 RX ADMIN — RIVASTIGMINE TRANSDERMAL SYSTEM 4.6 MG: 4.6 PATCH, EXTENDED RELEASE TRANSDERMAL at 11:00

## 2020-09-23 RX ADMIN — WARFARIN SODIUM 4 MG: 2 TABLET ORAL at 17:41

## 2020-09-23 RX ADMIN — MELATONIN TAB 3 MG 6 MG: 3 TAB at 22:15

## 2020-09-23 RX ADMIN — SERTRALINE HYDROCHLORIDE 50 MG: 50 TABLET ORAL at 08:09

## 2020-09-23 RX ADMIN — METOPROLOL TARTRATE 25 MG: 25 TABLET, FILM COATED ORAL at 22:30

## 2020-09-23 RX ADMIN — POTASSIUM CHLORIDE 20 MEQ: 20 TABLET, EXTENDED RELEASE ORAL at 08:09

## 2020-09-23 RX ADMIN — LEVOTHYROXINE SODIUM 100 MCG: 100 TABLET ORAL at 05:25

## 2020-09-24 LAB
GLUCOSE SERPL-MCNC: 117 MG/DL (ref 65–140)
GLUCOSE SERPL-MCNC: 120 MG/DL (ref 65–140)
GLUCOSE SERPL-MCNC: 136 MG/DL (ref 65–140)
GLUCOSE SERPL-MCNC: 148 MG/DL (ref 65–140)

## 2020-09-24 PROCEDURE — 99232 SBSQ HOSP IP/OBS MODERATE 35: CPT | Performed by: PSYCHIATRY & NEUROLOGY

## 2020-09-24 PROCEDURE — 97167 OT EVAL HIGH COMPLEX 60 MIN: CPT

## 2020-09-24 PROCEDURE — 97163 PT EVAL HIGH COMPLEX 45 MIN: CPT

## 2020-09-24 PROCEDURE — 82948 REAGENT STRIP/BLOOD GLUCOSE: CPT

## 2020-09-24 PROCEDURE — 97530 THERAPEUTIC ACTIVITIES: CPT

## 2020-09-24 PROCEDURE — 97535 SELF CARE MNGMENT TRAINING: CPT

## 2020-09-24 RX ORDER — WARFARIN SODIUM 5 MG/1
10 TABLET ORAL
Status: COMPLETED | OUTPATIENT
Start: 2020-09-24 | End: 2020-09-24

## 2020-09-24 RX ORDER — WARFARIN SODIUM 5 MG/1
10 TABLET ORAL
Status: DISCONTINUED | OUTPATIENT
Start: 2020-09-24 | End: 2020-09-24

## 2020-09-24 RX ORDER — WARFARIN SODIUM 2 MG/1
4 TABLET ORAL
Status: DISCONTINUED | OUTPATIENT
Start: 2020-09-25 | End: 2020-09-25

## 2020-09-24 RX ADMIN — POTASSIUM CHLORIDE 20 MEQ: 20 TABLET, EXTENDED RELEASE ORAL at 08:27

## 2020-09-24 RX ADMIN — CYANOCOBALAMIN TAB 1000 MCG 1000 MCG: 1000 TAB at 08:30

## 2020-09-24 RX ADMIN — FAMOTIDINE 10 MG: 20 TABLET ORAL at 08:27

## 2020-09-24 RX ADMIN — METOPROLOL TARTRATE 25 MG: 25 TABLET, FILM COATED ORAL at 21:13

## 2020-09-24 RX ADMIN — WARFARIN SODIUM 10 MG: 5 TABLET ORAL at 17:25

## 2020-09-24 RX ADMIN — SERTRALINE HYDROCHLORIDE 50 MG: 50 TABLET ORAL at 08:27

## 2020-09-24 RX ADMIN — RIVASTIGMINE TRANSDERMAL SYSTEM 4.6 MG: 4.6 PATCH, EXTENDED RELEASE TRANSDERMAL at 08:59

## 2020-09-24 RX ADMIN — METOPROLOL TARTRATE 25 MG: 25 TABLET, FILM COATED ORAL at 08:27

## 2020-09-24 RX ADMIN — LEVOTHYROXINE SODIUM 100 MCG: 100 TABLET ORAL at 05:49

## 2020-09-24 RX ADMIN — MELATONIN TAB 3 MG 6 MG: 3 TAB at 21:14

## 2020-09-24 RX ADMIN — FOLIC ACID 1 MG: 1 TABLET ORAL at 08:27

## 2020-09-24 RX ADMIN — FUROSEMIDE 20 MG: 20 TABLET ORAL at 08:27

## 2020-09-24 RX ADMIN — ASPIRIN 81 MG: 81 TABLET, COATED ORAL at 08:27

## 2020-09-25 LAB
GLUCOSE SERPL-MCNC: 109 MG/DL (ref 65–140)
GLUCOSE SERPL-MCNC: 130 MG/DL (ref 65–140)
GLUCOSE SERPL-MCNC: 135 MG/DL (ref 65–140)
GLUCOSE SERPL-MCNC: 154 MG/DL (ref 65–140)
INR PPP: 1.67 (ref 0.84–1.19)
PROTHROMBIN TIME: 19.8 SECONDS (ref 11.6–14.5)

## 2020-09-25 PROCEDURE — 99232 SBSQ HOSP IP/OBS MODERATE 35: CPT | Performed by: PSYCHIATRY & NEUROLOGY

## 2020-09-25 PROCEDURE — 82948 REAGENT STRIP/BLOOD GLUCOSE: CPT

## 2020-09-25 PROCEDURE — 85610 PROTHROMBIN TIME: CPT | Performed by: PHYSICIAN ASSISTANT

## 2020-09-25 RX ORDER — WARFARIN SODIUM 5 MG/1
5 TABLET ORAL
Status: DISCONTINUED | OUTPATIENT
Start: 2020-09-25 | End: 2020-10-16 | Stop reason: HOSPADM

## 2020-09-25 RX ADMIN — CYANOCOBALAMIN TAB 1000 MCG 1000 MCG: 1000 TAB at 08:28

## 2020-09-25 RX ADMIN — FAMOTIDINE 10 MG: 20 TABLET ORAL at 08:29

## 2020-09-25 RX ADMIN — POTASSIUM CHLORIDE 20 MEQ: 20 TABLET, EXTENDED RELEASE ORAL at 08:28

## 2020-09-25 RX ADMIN — METOPROLOL TARTRATE 25 MG: 25 TABLET, FILM COATED ORAL at 21:18

## 2020-09-25 RX ADMIN — FUROSEMIDE 20 MG: 20 TABLET ORAL at 08:30

## 2020-09-25 RX ADMIN — MELATONIN TAB 3 MG 6 MG: 3 TAB at 21:18

## 2020-09-25 RX ADMIN — LEVOTHYROXINE SODIUM 100 MCG: 100 TABLET ORAL at 05:43

## 2020-09-25 RX ADMIN — FOLIC ACID 1 MG: 1 TABLET ORAL at 08:28

## 2020-09-25 RX ADMIN — RIVASTIGMINE TRANSDERMAL SYSTEM 4.6 MG: 4.6 PATCH, EXTENDED RELEASE TRANSDERMAL at 08:30

## 2020-09-25 RX ADMIN — WARFARIN SODIUM 5 MG: 5 TABLET ORAL at 17:09

## 2020-09-25 RX ADMIN — ASPIRIN 81 MG: 81 TABLET, COATED ORAL at 08:28

## 2020-09-25 RX ADMIN — METOPROLOL TARTRATE 25 MG: 25 TABLET, FILM COATED ORAL at 08:28

## 2020-09-25 RX ADMIN — SERTRALINE HYDROCHLORIDE 50 MG: 50 TABLET ORAL at 08:30

## 2020-09-26 LAB
GLUCOSE SERPL-MCNC: 109 MG/DL (ref 65–140)
GLUCOSE SERPL-MCNC: 124 MG/DL (ref 65–140)
GLUCOSE SERPL-MCNC: 127 MG/DL (ref 65–140)
GLUCOSE SERPL-MCNC: 133 MG/DL (ref 65–140)
INR PPP: 1.86 (ref 0.84–1.19)
PROTHROMBIN TIME: 21.6 SECONDS (ref 11.6–14.5)

## 2020-09-26 PROCEDURE — 85610 PROTHROMBIN TIME: CPT | Performed by: PHYSICIAN ASSISTANT

## 2020-09-26 PROCEDURE — 82948 REAGENT STRIP/BLOOD GLUCOSE: CPT

## 2020-09-26 RX ADMIN — FAMOTIDINE 10 MG: 20 TABLET ORAL at 08:45

## 2020-09-26 RX ADMIN — CYANOCOBALAMIN TAB 1000 MCG 1000 MCG: 1000 TAB at 08:45

## 2020-09-26 RX ADMIN — ACETAMINOPHEN 975 MG: 325 TABLET ORAL at 12:36

## 2020-09-26 RX ADMIN — POTASSIUM CHLORIDE 20 MEQ: 20 TABLET, EXTENDED RELEASE ORAL at 08:45

## 2020-09-26 RX ADMIN — SERTRALINE HYDROCHLORIDE 50 MG: 50 TABLET ORAL at 08:45

## 2020-09-26 RX ADMIN — METOPROLOL TARTRATE 25 MG: 25 TABLET, FILM COATED ORAL at 08:45

## 2020-09-26 RX ADMIN — MELATONIN TAB 3 MG 6 MG: 3 TAB at 21:18

## 2020-09-26 RX ADMIN — ASPIRIN 81 MG: 81 TABLET, COATED ORAL at 08:45

## 2020-09-26 RX ADMIN — LEVOTHYROXINE SODIUM 100 MCG: 100 TABLET ORAL at 06:18

## 2020-09-26 RX ADMIN — FOLIC ACID 1 MG: 1 TABLET ORAL at 08:45

## 2020-09-26 RX ADMIN — WARFARIN SODIUM 5 MG: 5 TABLET ORAL at 17:05

## 2020-09-26 RX ADMIN — RIVASTIGMINE TRANSDERMAL SYSTEM 4.6 MG: 4.6 PATCH, EXTENDED RELEASE TRANSDERMAL at 08:46

## 2020-09-26 RX ADMIN — FUROSEMIDE 20 MG: 20 TABLET ORAL at 08:45

## 2020-09-27 LAB
GLUCOSE SERPL-MCNC: 113 MG/DL (ref 65–140)
GLUCOSE SERPL-MCNC: 119 MG/DL (ref 65–140)
GLUCOSE SERPL-MCNC: 145 MG/DL (ref 65–140)
GLUCOSE SERPL-MCNC: 169 MG/DL (ref 65–140)
INR PPP: 2.25 (ref 0.84–1.19)
PROTHROMBIN TIME: 24.9 SECONDS (ref 11.6–14.5)

## 2020-09-27 PROCEDURE — 82948 REAGENT STRIP/BLOOD GLUCOSE: CPT

## 2020-09-27 PROCEDURE — 85610 PROTHROMBIN TIME: CPT | Performed by: PHYSICIAN ASSISTANT

## 2020-09-27 RX ADMIN — WARFARIN SODIUM 5 MG: 5 TABLET ORAL at 17:05

## 2020-09-27 RX ADMIN — ASPIRIN 81 MG: 81 TABLET, COATED ORAL at 08:17

## 2020-09-27 RX ADMIN — METOPROLOL TARTRATE 25 MG: 25 TABLET, FILM COATED ORAL at 08:17

## 2020-09-27 RX ADMIN — RIVASTIGMINE TRANSDERMAL SYSTEM 4.6 MG: 4.6 PATCH, EXTENDED RELEASE TRANSDERMAL at 08:16

## 2020-09-27 RX ADMIN — FAMOTIDINE 10 MG: 20 TABLET ORAL at 08:17

## 2020-09-27 RX ADMIN — FOLIC ACID 1 MG: 1 TABLET ORAL at 08:17

## 2020-09-27 RX ADMIN — LEVOTHYROXINE SODIUM 100 MCG: 100 TABLET ORAL at 06:04

## 2020-09-27 RX ADMIN — POTASSIUM CHLORIDE 20 MEQ: 20 TABLET, EXTENDED RELEASE ORAL at 08:17

## 2020-09-27 RX ADMIN — FUROSEMIDE 20 MG: 20 TABLET ORAL at 08:17

## 2020-09-27 RX ADMIN — METOPROLOL TARTRATE 25 MG: 25 TABLET, FILM COATED ORAL at 21:07

## 2020-09-27 RX ADMIN — MELATONIN TAB 3 MG 6 MG: 3 TAB at 21:08

## 2020-09-27 RX ADMIN — CYANOCOBALAMIN TAB 1000 MCG 1000 MCG: 1000 TAB at 08:17

## 2020-09-27 RX ADMIN — SERTRALINE HYDROCHLORIDE 50 MG: 50 TABLET ORAL at 08:17

## 2020-09-28 LAB
GLUCOSE SERPL-MCNC: 116 MG/DL (ref 65–140)
GLUCOSE SERPL-MCNC: 137 MG/DL (ref 65–140)
GLUCOSE SERPL-MCNC: 139 MG/DL (ref 65–140)
GLUCOSE SERPL-MCNC: 218 MG/DL (ref 65–140)

## 2020-09-28 PROCEDURE — 82948 REAGENT STRIP/BLOOD GLUCOSE: CPT

## 2020-09-28 PROCEDURE — 97116 GAIT TRAINING THERAPY: CPT

## 2020-09-28 PROCEDURE — 97110 THERAPEUTIC EXERCISES: CPT

## 2020-09-28 PROCEDURE — 97530 THERAPEUTIC ACTIVITIES: CPT

## 2020-09-28 PROCEDURE — 99232 SBSQ HOSP IP/OBS MODERATE 35: CPT | Performed by: PSYCHIATRY & NEUROLOGY

## 2020-09-28 PROCEDURE — 97535 SELF CARE MNGMENT TRAINING: CPT

## 2020-09-28 RX ADMIN — FOLIC ACID 1 MG: 1 TABLET ORAL at 08:27

## 2020-09-28 RX ADMIN — LEVOTHYROXINE SODIUM 100 MCG: 100 TABLET ORAL at 04:54

## 2020-09-28 RX ADMIN — FAMOTIDINE 10 MG: 20 TABLET ORAL at 08:27

## 2020-09-28 RX ADMIN — FUROSEMIDE 20 MG: 20 TABLET ORAL at 08:27

## 2020-09-28 RX ADMIN — METOPROLOL TARTRATE 25 MG: 25 TABLET, FILM COATED ORAL at 21:00

## 2020-09-28 RX ADMIN — MELATONIN TAB 3 MG 6 MG: 3 TAB at 21:00

## 2020-09-28 RX ADMIN — SERTRALINE HYDROCHLORIDE 50 MG: 50 TABLET ORAL at 08:27

## 2020-09-28 RX ADMIN — ASPIRIN 81 MG: 81 TABLET, COATED ORAL at 08:28

## 2020-09-28 RX ADMIN — METOPROLOL TARTRATE 25 MG: 25 TABLET, FILM COATED ORAL at 08:28

## 2020-09-28 RX ADMIN — WARFARIN SODIUM 5 MG: 5 TABLET ORAL at 17:07

## 2020-09-28 RX ADMIN — RIVASTIGMINE TRANSDERMAL SYSTEM 4.6 MG: 4.6 PATCH, EXTENDED RELEASE TRANSDERMAL at 08:30

## 2020-09-28 RX ADMIN — POTASSIUM CHLORIDE 20 MEQ: 20 TABLET, EXTENDED RELEASE ORAL at 08:27

## 2020-09-28 RX ADMIN — CYANOCOBALAMIN TAB 1000 MCG 1000 MCG: 1000 TAB at 08:27

## 2020-09-29 LAB
GLUCOSE SERPL-MCNC: 116 MG/DL (ref 65–140)
GLUCOSE SERPL-MCNC: 141 MG/DL (ref 65–140)
GLUCOSE SERPL-MCNC: 148 MG/DL (ref 65–140)
GLUCOSE SERPL-MCNC: 152 MG/DL (ref 65–140)

## 2020-09-29 PROCEDURE — 99232 SBSQ HOSP IP/OBS MODERATE 35: CPT | Performed by: PSYCHIATRY & NEUROLOGY

## 2020-09-29 PROCEDURE — 82948 REAGENT STRIP/BLOOD GLUCOSE: CPT

## 2020-09-29 RX ADMIN — POTASSIUM CHLORIDE 20 MEQ: 20 TABLET, EXTENDED RELEASE ORAL at 08:37

## 2020-09-29 RX ADMIN — INSULIN LISPRO 1 UNITS: 100 INJECTION, SOLUTION INTRAVENOUS; SUBCUTANEOUS at 22:10

## 2020-09-29 RX ADMIN — CYANOCOBALAMIN TAB 1000 MCG 1000 MCG: 1000 TAB at 08:37

## 2020-09-29 RX ADMIN — SERTRALINE HYDROCHLORIDE 50 MG: 50 TABLET ORAL at 08:37

## 2020-09-29 RX ADMIN — WARFARIN SODIUM 5 MG: 5 TABLET ORAL at 17:03

## 2020-09-29 RX ADMIN — ASPIRIN 81 MG: 81 TABLET, COATED ORAL at 08:37

## 2020-09-29 RX ADMIN — MELATONIN TAB 3 MG 6 MG: 3 TAB at 21:17

## 2020-09-29 RX ADMIN — FUROSEMIDE 20 MG: 20 TABLET ORAL at 08:37

## 2020-09-29 RX ADMIN — FOLIC ACID 1 MG: 1 TABLET ORAL at 08:37

## 2020-09-29 RX ADMIN — RIVASTIGMINE TRANSDERMAL SYSTEM 4.6 MG: 4.6 PATCH, EXTENDED RELEASE TRANSDERMAL at 08:45

## 2020-09-29 RX ADMIN — FAMOTIDINE 10 MG: 20 TABLET ORAL at 08:37

## 2020-09-29 RX ADMIN — METOPROLOL TARTRATE 25 MG: 25 TABLET, FILM COATED ORAL at 08:38

## 2020-09-29 RX ADMIN — LEVOTHYROXINE SODIUM 100 MCG: 100 TABLET ORAL at 06:14

## 2020-09-30 LAB
GLUCOSE SERPL-MCNC: 107 MG/DL (ref 65–140)
GLUCOSE SERPL-MCNC: 109 MG/DL (ref 65–140)
GLUCOSE SERPL-MCNC: 114 MG/DL (ref 65–140)
GLUCOSE SERPL-MCNC: 126 MG/DL (ref 65–140)
INR PPP: 2.21 (ref 0.84–1.19)
PROTHROMBIN TIME: 24.6 SECONDS (ref 11.6–14.5)

## 2020-09-30 PROCEDURE — 99232 SBSQ HOSP IP/OBS MODERATE 35: CPT | Performed by: PSYCHIATRY & NEUROLOGY

## 2020-09-30 PROCEDURE — 85610 PROTHROMBIN TIME: CPT | Performed by: PHYSICIAN ASSISTANT

## 2020-09-30 PROCEDURE — 82948 REAGENT STRIP/BLOOD GLUCOSE: CPT

## 2020-09-30 RX ORDER — LOPERAMIDE HYDROCHLORIDE 2 MG/1
2 CAPSULE ORAL ONCE
Status: COMPLETED | OUTPATIENT
Start: 2020-09-30 | End: 2020-09-30

## 2020-09-30 RX ADMIN — SERTRALINE HYDROCHLORIDE 50 MG: 50 TABLET ORAL at 08:44

## 2020-09-30 RX ADMIN — FOLIC ACID 1 MG: 1 TABLET ORAL at 08:45

## 2020-09-30 RX ADMIN — ASPIRIN 81 MG: 81 TABLET, COATED ORAL at 08:45

## 2020-09-30 RX ADMIN — CYANOCOBALAMIN TAB 1000 MCG 1000 MCG: 1000 TAB at 08:45

## 2020-09-30 RX ADMIN — RIVASTIGMINE TRANSDERMAL SYSTEM 4.6 MG: 4.6 PATCH, EXTENDED RELEASE TRANSDERMAL at 08:47

## 2020-09-30 RX ADMIN — METOPROLOL TARTRATE 25 MG: 25 TABLET, FILM COATED ORAL at 08:45

## 2020-09-30 RX ADMIN — LOPERAMIDE HYDROCHLORIDE 2 MG: 2 CAPSULE ORAL at 13:14

## 2020-09-30 RX ADMIN — MELATONIN TAB 3 MG 6 MG: 3 TAB at 21:55

## 2020-09-30 RX ADMIN — WARFARIN SODIUM 5 MG: 5 TABLET ORAL at 17:09

## 2020-09-30 RX ADMIN — METOPROLOL TARTRATE 25 MG: 25 TABLET, FILM COATED ORAL at 21:54

## 2020-09-30 RX ADMIN — LEVOTHYROXINE SODIUM 100 MCG: 100 TABLET ORAL at 05:41

## 2020-09-30 RX ADMIN — FAMOTIDINE 10 MG: 20 TABLET ORAL at 08:44

## 2020-09-30 RX ADMIN — POTASSIUM CHLORIDE 20 MEQ: 20 TABLET, EXTENDED RELEASE ORAL at 08:45

## 2020-10-01 LAB
GLUCOSE SERPL-MCNC: 102 MG/DL (ref 65–140)
GLUCOSE SERPL-MCNC: 110 MG/DL (ref 65–140)
GLUCOSE SERPL-MCNC: 135 MG/DL (ref 65–140)
GLUCOSE SERPL-MCNC: 135 MG/DL (ref 65–140)

## 2020-10-01 PROCEDURE — 99232 SBSQ HOSP IP/OBS MODERATE 35: CPT | Performed by: PSYCHIATRY & NEUROLOGY

## 2020-10-01 PROCEDURE — 82948 REAGENT STRIP/BLOOD GLUCOSE: CPT

## 2020-10-01 RX ADMIN — POTASSIUM CHLORIDE 20 MEQ: 20 TABLET, EXTENDED RELEASE ORAL at 08:20

## 2020-10-01 RX ADMIN — FOLIC ACID 1 MG: 1 TABLET ORAL at 08:20

## 2020-10-01 RX ADMIN — ALUMINUM HYDROXIDE, MAGNESIUM HYDROXIDE, AND SIMETHICONE 30 ML: 200; 200; 20 SUSPENSION ORAL at 22:00

## 2020-10-01 RX ADMIN — ASPIRIN 81 MG: 81 TABLET, COATED ORAL at 08:20

## 2020-10-01 RX ADMIN — METOPROLOL TARTRATE 25 MG: 25 TABLET, FILM COATED ORAL at 21:32

## 2020-10-01 RX ADMIN — LEVOTHYROXINE SODIUM 100 MCG: 100 TABLET ORAL at 06:43

## 2020-10-01 RX ADMIN — CYANOCOBALAMIN TAB 1000 MCG 1000 MCG: 1000 TAB at 08:20

## 2020-10-01 RX ADMIN — FAMOTIDINE 10 MG: 20 TABLET ORAL at 08:18

## 2020-10-01 RX ADMIN — SERTRALINE HYDROCHLORIDE 50 MG: 50 TABLET ORAL at 08:20

## 2020-10-01 RX ADMIN — MELATONIN TAB 3 MG 6 MG: 3 TAB at 21:32

## 2020-10-01 RX ADMIN — WARFARIN SODIUM 5 MG: 5 TABLET ORAL at 17:25

## 2020-10-01 RX ADMIN — RIVASTIGMINE TRANSDERMAL SYSTEM 4.6 MG: 4.6 PATCH, EXTENDED RELEASE TRANSDERMAL at 08:25

## 2020-10-01 RX ADMIN — METOPROLOL TARTRATE 25 MG: 25 TABLET, FILM COATED ORAL at 08:20

## 2020-10-02 LAB
GLUCOSE SERPL-MCNC: 118 MG/DL (ref 65–140)
GLUCOSE SERPL-MCNC: 141 MG/DL (ref 65–140)
GLUCOSE SERPL-MCNC: 144 MG/DL (ref 65–140)
GLUCOSE SERPL-MCNC: 171 MG/DL (ref 65–140)

## 2020-10-02 PROCEDURE — 82948 REAGENT STRIP/BLOOD GLUCOSE: CPT

## 2020-10-02 PROCEDURE — 99232 SBSQ HOSP IP/OBS MODERATE 35: CPT | Performed by: PSYCHIATRY & NEUROLOGY

## 2020-10-02 RX ADMIN — POTASSIUM CHLORIDE 20 MEQ: 20 TABLET, EXTENDED RELEASE ORAL at 08:21

## 2020-10-02 RX ADMIN — RIVASTIGMINE TRANSDERMAL SYSTEM 4.6 MG: 4.6 PATCH, EXTENDED RELEASE TRANSDERMAL at 08:28

## 2020-10-02 RX ADMIN — ASPIRIN 81 MG: 81 TABLET, COATED ORAL at 08:21

## 2020-10-02 RX ADMIN — MELATONIN TAB 3 MG 6 MG: 3 TAB at 22:02

## 2020-10-02 RX ADMIN — FUROSEMIDE 20 MG: 20 TABLET ORAL at 08:21

## 2020-10-02 RX ADMIN — WARFARIN SODIUM 5 MG: 5 TABLET ORAL at 17:30

## 2020-10-02 RX ADMIN — FAMOTIDINE 10 MG: 20 TABLET ORAL at 08:20

## 2020-10-02 RX ADMIN — CYANOCOBALAMIN TAB 1000 MCG 1000 MCG: 1000 TAB at 08:21

## 2020-10-02 RX ADMIN — LEVOTHYROXINE SODIUM 100 MCG: 100 TABLET ORAL at 05:54

## 2020-10-02 RX ADMIN — SERTRALINE HYDROCHLORIDE 50 MG: 50 TABLET ORAL at 08:22

## 2020-10-02 RX ADMIN — METOPROLOL TARTRATE 25 MG: 25 TABLET, FILM COATED ORAL at 08:21

## 2020-10-02 RX ADMIN — INSULIN LISPRO 1 UNITS: 100 INJECTION, SOLUTION INTRAVENOUS; SUBCUTANEOUS at 17:30

## 2020-10-03 LAB
GLUCOSE SERPL-MCNC: 115 MG/DL (ref 65–140)
GLUCOSE SERPL-MCNC: 117 MG/DL (ref 65–140)
GLUCOSE SERPL-MCNC: 123 MG/DL (ref 65–140)
GLUCOSE SERPL-MCNC: 138 MG/DL (ref 65–140)

## 2020-10-03 PROCEDURE — 82948 REAGENT STRIP/BLOOD GLUCOSE: CPT

## 2020-10-03 PROCEDURE — 99232 SBSQ HOSP IP/OBS MODERATE 35: CPT | Performed by: PSYCHIATRY & NEUROLOGY

## 2020-10-03 RX ADMIN — FUROSEMIDE 20 MG: 20 TABLET ORAL at 08:33

## 2020-10-03 RX ADMIN — MELATONIN TAB 3 MG 6 MG: 3 TAB at 21:34

## 2020-10-03 RX ADMIN — CYANOCOBALAMIN TAB 1000 MCG 1000 MCG: 1000 TAB at 08:33

## 2020-10-03 RX ADMIN — POTASSIUM CHLORIDE 20 MEQ: 20 TABLET, EXTENDED RELEASE ORAL at 08:33

## 2020-10-03 RX ADMIN — RIVASTIGMINE TRANSDERMAL SYSTEM 4.6 MG: 4.6 PATCH, EXTENDED RELEASE TRANSDERMAL at 08:39

## 2020-10-03 RX ADMIN — ASPIRIN 81 MG: 81 TABLET, COATED ORAL at 08:32

## 2020-10-03 RX ADMIN — FOLIC ACID 1 MG: 1 TABLET ORAL at 08:33

## 2020-10-03 RX ADMIN — METOPROLOL TARTRATE 25 MG: 25 TABLET, FILM COATED ORAL at 08:33

## 2020-10-03 RX ADMIN — LEVOTHYROXINE SODIUM 100 MCG: 100 TABLET ORAL at 05:59

## 2020-10-03 RX ADMIN — FAMOTIDINE 10 MG: 20 TABLET ORAL at 08:33

## 2020-10-03 RX ADMIN — SERTRALINE HYDROCHLORIDE 50 MG: 50 TABLET ORAL at 08:33

## 2020-10-03 RX ADMIN — METOPROLOL TARTRATE 25 MG: 25 TABLET, FILM COATED ORAL at 21:34

## 2020-10-03 RX ADMIN — WARFARIN SODIUM 5 MG: 5 TABLET ORAL at 17:02

## 2020-10-04 LAB
GLUCOSE SERPL-MCNC: 112 MG/DL (ref 65–140)
GLUCOSE SERPL-MCNC: 120 MG/DL (ref 65–140)
GLUCOSE SERPL-MCNC: 141 MG/DL (ref 65–140)
GLUCOSE SERPL-MCNC: 170 MG/DL (ref 65–140)

## 2020-10-04 PROCEDURE — 99232 SBSQ HOSP IP/OBS MODERATE 35: CPT | Performed by: PSYCHIATRY & NEUROLOGY

## 2020-10-04 PROCEDURE — 82948 REAGENT STRIP/BLOOD GLUCOSE: CPT

## 2020-10-04 RX ADMIN — FAMOTIDINE 10 MG: 20 TABLET ORAL at 08:30

## 2020-10-04 RX ADMIN — METOPROLOL TARTRATE 25 MG: 25 TABLET, FILM COATED ORAL at 21:26

## 2020-10-04 RX ADMIN — CYANOCOBALAMIN TAB 1000 MCG 1000 MCG: 1000 TAB at 08:30

## 2020-10-04 RX ADMIN — INSULIN LISPRO 1 UNITS: 100 INJECTION, SOLUTION INTRAVENOUS; SUBCUTANEOUS at 16:43

## 2020-10-04 RX ADMIN — RIVASTIGMINE TRANSDERMAL SYSTEM 4.6 MG: 4.6 PATCH, EXTENDED RELEASE TRANSDERMAL at 08:30

## 2020-10-04 RX ADMIN — ASPIRIN 81 MG: 81 TABLET, COATED ORAL at 08:30

## 2020-10-04 RX ADMIN — WARFARIN SODIUM 5 MG: 5 TABLET ORAL at 17:00

## 2020-10-04 RX ADMIN — FUROSEMIDE 20 MG: 20 TABLET ORAL at 08:32

## 2020-10-04 RX ADMIN — SERTRALINE HYDROCHLORIDE 50 MG: 50 TABLET ORAL at 08:30

## 2020-10-04 RX ADMIN — FOLIC ACID 1 MG: 1 TABLET ORAL at 08:30

## 2020-10-04 RX ADMIN — METOPROLOL TARTRATE 25 MG: 25 TABLET, FILM COATED ORAL at 08:30

## 2020-10-04 RX ADMIN — POTASSIUM CHLORIDE 20 MEQ: 20 TABLET, EXTENDED RELEASE ORAL at 08:30

## 2020-10-04 RX ADMIN — MELATONIN TAB 3 MG 6 MG: 3 TAB at 21:26

## 2020-10-04 RX ADMIN — LEVOTHYROXINE SODIUM 100 MCG: 100 TABLET ORAL at 06:08

## 2020-10-05 LAB
GLUCOSE SERPL-MCNC: 105 MG/DL (ref 65–140)
GLUCOSE SERPL-MCNC: 131 MG/DL (ref 65–140)
GLUCOSE SERPL-MCNC: 152 MG/DL (ref 65–140)
GLUCOSE SERPL-MCNC: 84 MG/DL (ref 65–140)

## 2020-10-05 PROCEDURE — 99232 SBSQ HOSP IP/OBS MODERATE 35: CPT | Performed by: PSYCHIATRY & NEUROLOGY

## 2020-10-05 PROCEDURE — 82948 REAGENT STRIP/BLOOD GLUCOSE: CPT

## 2020-10-05 RX ADMIN — ASPIRIN 81 MG: 81 TABLET, COATED ORAL at 08:35

## 2020-10-05 RX ADMIN — FOLIC ACID 1 MG: 1 TABLET ORAL at 08:35

## 2020-10-05 RX ADMIN — RIVASTIGMINE TRANSDERMAL SYSTEM 4.6 MG: 4.6 PATCH, EXTENDED RELEASE TRANSDERMAL at 08:40

## 2020-10-05 RX ADMIN — CYANOCOBALAMIN TAB 1000 MCG 1000 MCG: 1000 TAB at 08:35

## 2020-10-05 RX ADMIN — FAMOTIDINE 10 MG: 20 TABLET ORAL at 08:34

## 2020-10-05 RX ADMIN — METOPROLOL TARTRATE 25 MG: 25 TABLET, FILM COATED ORAL at 08:35

## 2020-10-05 RX ADMIN — POTASSIUM CHLORIDE 20 MEQ: 20 TABLET, EXTENDED RELEASE ORAL at 08:35

## 2020-10-05 RX ADMIN — FUROSEMIDE 20 MG: 20 TABLET ORAL at 08:35

## 2020-10-05 RX ADMIN — LEVOTHYROXINE SODIUM 100 MCG: 100 TABLET ORAL at 05:40

## 2020-10-05 RX ADMIN — METOPROLOL TARTRATE 25 MG: 25 TABLET, FILM COATED ORAL at 21:50

## 2020-10-05 RX ADMIN — SERTRALINE HYDROCHLORIDE 50 MG: 50 TABLET ORAL at 08:35

## 2020-10-05 RX ADMIN — MELATONIN TAB 3 MG 6 MG: 3 TAB at 21:50

## 2020-10-05 RX ADMIN — INSULIN LISPRO 1 UNITS: 100 INJECTION, SOLUTION INTRAVENOUS; SUBCUTANEOUS at 17:23

## 2020-10-05 RX ADMIN — WARFARIN SODIUM 5 MG: 5 TABLET ORAL at 17:22

## 2020-10-06 LAB
GLUCOSE SERPL-MCNC: 120 MG/DL (ref 65–140)
GLUCOSE SERPL-MCNC: 125 MG/DL (ref 65–140)
GLUCOSE SERPL-MCNC: 125 MG/DL (ref 65–140)
GLUCOSE SERPL-MCNC: 134 MG/DL (ref 65–140)

## 2020-10-06 PROCEDURE — 99232 SBSQ HOSP IP/OBS MODERATE 35: CPT | Performed by: PSYCHIATRY & NEUROLOGY

## 2020-10-06 PROCEDURE — 82948 REAGENT STRIP/BLOOD GLUCOSE: CPT

## 2020-10-06 RX ADMIN — METOPROLOL TARTRATE 25 MG: 25 TABLET, FILM COATED ORAL at 21:44

## 2020-10-06 RX ADMIN — ASPIRIN 81 MG: 81 TABLET, COATED ORAL at 08:11

## 2020-10-06 RX ADMIN — FOLIC ACID 1 MG: 1 TABLET ORAL at 08:13

## 2020-10-06 RX ADMIN — LEVOTHYROXINE SODIUM 100 MCG: 100 TABLET ORAL at 06:14

## 2020-10-06 RX ADMIN — POTASSIUM CHLORIDE 20 MEQ: 20 TABLET, EXTENDED RELEASE ORAL at 08:13

## 2020-10-06 RX ADMIN — FAMOTIDINE 10 MG: 20 TABLET ORAL at 08:12

## 2020-10-06 RX ADMIN — CYANOCOBALAMIN TAB 1000 MCG 1000 MCG: 1000 TAB at 08:13

## 2020-10-06 RX ADMIN — METOPROLOL TARTRATE 25 MG: 25 TABLET, FILM COATED ORAL at 08:11

## 2020-10-06 RX ADMIN — RIVASTIGMINE TRANSDERMAL SYSTEM 4.6 MG: 4.6 PATCH, EXTENDED RELEASE TRANSDERMAL at 08:23

## 2020-10-06 RX ADMIN — MELATONIN TAB 3 MG 6 MG: 3 TAB at 21:44

## 2020-10-06 RX ADMIN — FUROSEMIDE 20 MG: 20 TABLET ORAL at 08:13

## 2020-10-06 RX ADMIN — SERTRALINE HYDROCHLORIDE 50 MG: 50 TABLET ORAL at 08:23

## 2020-10-06 RX ADMIN — WARFARIN SODIUM 5 MG: 5 TABLET ORAL at 17:12

## 2020-10-06 RX ADMIN — ACETAMINOPHEN 975 MG: 325 TABLET ORAL at 16:06

## 2020-10-07 LAB
GLUCOSE SERPL-MCNC: 118 MG/DL (ref 65–140)
GLUCOSE SERPL-MCNC: 118 MG/DL (ref 65–140)
GLUCOSE SERPL-MCNC: 149 MG/DL (ref 65–140)
GLUCOSE SERPL-MCNC: 153 MG/DL (ref 65–140)
INR PPP: 2.41 (ref 0.84–1.19)
PROTHROMBIN TIME: 26.3 SECONDS (ref 11.6–14.5)

## 2020-10-07 PROCEDURE — 82948 REAGENT STRIP/BLOOD GLUCOSE: CPT

## 2020-10-07 PROCEDURE — 85610 PROTHROMBIN TIME: CPT | Performed by: INTERNAL MEDICINE

## 2020-10-07 PROCEDURE — 99232 SBSQ HOSP IP/OBS MODERATE 35: CPT | Performed by: PSYCHIATRY & NEUROLOGY

## 2020-10-07 RX ADMIN — METOPROLOL TARTRATE 25 MG: 25 TABLET, FILM COATED ORAL at 08:39

## 2020-10-07 RX ADMIN — SERTRALINE HYDROCHLORIDE 50 MG: 50 TABLET ORAL at 08:39

## 2020-10-07 RX ADMIN — FAMOTIDINE 10 MG: 20 TABLET ORAL at 08:42

## 2020-10-07 RX ADMIN — FOLIC ACID 1 MG: 1 TABLET ORAL at 08:39

## 2020-10-07 RX ADMIN — CYANOCOBALAMIN TAB 1000 MCG 1000 MCG: 1000 TAB at 08:39

## 2020-10-07 RX ADMIN — ASPIRIN 81 MG: 81 TABLET, COATED ORAL at 08:39

## 2020-10-07 RX ADMIN — LEVOTHYROXINE SODIUM 100 MCG: 100 TABLET ORAL at 05:44

## 2020-10-07 RX ADMIN — MELATONIN TAB 3 MG 6 MG: 3 TAB at 22:06

## 2020-10-07 RX ADMIN — INSULIN LISPRO 1 UNITS: 100 INJECTION, SOLUTION INTRAVENOUS; SUBCUTANEOUS at 17:00

## 2020-10-07 RX ADMIN — METOPROLOL TARTRATE 25 MG: 25 TABLET, FILM COATED ORAL at 22:06

## 2020-10-07 RX ADMIN — POTASSIUM CHLORIDE 20 MEQ: 20 TABLET, EXTENDED RELEASE ORAL at 08:39

## 2020-10-07 RX ADMIN — RIVASTIGMINE TRANSDERMAL SYSTEM 4.6 MG: 4.6 PATCH, EXTENDED RELEASE TRANSDERMAL at 08:43

## 2020-10-07 RX ADMIN — FUROSEMIDE 20 MG: 20 TABLET ORAL at 08:39

## 2020-10-07 RX ADMIN — WARFARIN SODIUM 5 MG: 5 TABLET ORAL at 17:15

## 2020-10-08 LAB
GLUCOSE SERPL-MCNC: 108 MG/DL (ref 65–140)
GLUCOSE SERPL-MCNC: 117 MG/DL (ref 65–140)
GLUCOSE SERPL-MCNC: 122 MG/DL (ref 65–140)
GLUCOSE SERPL-MCNC: 134 MG/DL (ref 65–140)

## 2020-10-08 PROCEDURE — 82948 REAGENT STRIP/BLOOD GLUCOSE: CPT

## 2020-10-08 PROCEDURE — 99232 SBSQ HOSP IP/OBS MODERATE 35: CPT | Performed by: PSYCHIATRY & NEUROLOGY

## 2020-10-08 RX ADMIN — FUROSEMIDE 20 MG: 20 TABLET ORAL at 08:20

## 2020-10-08 RX ADMIN — RIVASTIGMINE TRANSDERMAL SYSTEM 4.6 MG: 4.6 PATCH, EXTENDED RELEASE TRANSDERMAL at 08:33

## 2020-10-08 RX ADMIN — WARFARIN SODIUM 5 MG: 5 TABLET ORAL at 17:14

## 2020-10-08 RX ADMIN — CYANOCOBALAMIN TAB 1000 MCG 1000 MCG: 1000 TAB at 08:19

## 2020-10-08 RX ADMIN — SERTRALINE HYDROCHLORIDE 50 MG: 50 TABLET ORAL at 08:20

## 2020-10-08 RX ADMIN — METOPROLOL TARTRATE 25 MG: 25 TABLET, FILM COATED ORAL at 21:34

## 2020-10-08 RX ADMIN — LEVOTHYROXINE SODIUM 100 MCG: 100 TABLET ORAL at 06:58

## 2020-10-08 RX ADMIN — MELATONIN TAB 3 MG 6 MG: 3 TAB at 21:34

## 2020-10-08 RX ADMIN — POTASSIUM CHLORIDE 20 MEQ: 20 TABLET, EXTENDED RELEASE ORAL at 08:20

## 2020-10-08 RX ADMIN — FOLIC ACID 1 MG: 1 TABLET ORAL at 08:19

## 2020-10-08 RX ADMIN — FAMOTIDINE 10 MG: 20 TABLET ORAL at 08:19

## 2020-10-08 RX ADMIN — ASPIRIN 81 MG: 81 TABLET, COATED ORAL at 08:20

## 2020-10-09 LAB
GLUCOSE SERPL-MCNC: 101 MG/DL (ref 65–140)
GLUCOSE SERPL-MCNC: 111 MG/DL (ref 65–140)
GLUCOSE SERPL-MCNC: 150 MG/DL (ref 65–140)
GLUCOSE SERPL-MCNC: 191 MG/DL (ref 65–140)

## 2020-10-09 PROCEDURE — 97116 GAIT TRAINING THERAPY: CPT

## 2020-10-09 PROCEDURE — 82948 REAGENT STRIP/BLOOD GLUCOSE: CPT

## 2020-10-09 PROCEDURE — 99232 SBSQ HOSP IP/OBS MODERATE 35: CPT | Performed by: PSYCHIATRY & NEUROLOGY

## 2020-10-09 PROCEDURE — 97112 NEUROMUSCULAR REEDUCATION: CPT

## 2020-10-09 RX ADMIN — RIVASTIGMINE TRANSDERMAL SYSTEM 4.6 MG: 4.6 PATCH, EXTENDED RELEASE TRANSDERMAL at 08:27

## 2020-10-09 RX ADMIN — ASPIRIN 81 MG: 81 TABLET, COATED ORAL at 08:27

## 2020-10-09 RX ADMIN — METOPROLOL TARTRATE 25 MG: 25 TABLET, FILM COATED ORAL at 08:24

## 2020-10-09 RX ADMIN — CYANOCOBALAMIN TAB 1000 MCG 1000 MCG: 1000 TAB at 08:27

## 2020-10-09 RX ADMIN — LEVOTHYROXINE SODIUM 100 MCG: 100 TABLET ORAL at 06:28

## 2020-10-09 RX ADMIN — MELATONIN TAB 3 MG 6 MG: 3 TAB at 21:40

## 2020-10-09 RX ADMIN — INSULIN LISPRO 1 UNITS: 100 INJECTION, SOLUTION INTRAVENOUS; SUBCUTANEOUS at 16:59

## 2020-10-09 RX ADMIN — FAMOTIDINE 10 MG: 20 TABLET ORAL at 08:27

## 2020-10-09 RX ADMIN — POTASSIUM CHLORIDE 20 MEQ: 20 TABLET, EXTENDED RELEASE ORAL at 08:25

## 2020-10-09 RX ADMIN — FOLIC ACID 1 MG: 1 TABLET ORAL at 08:27

## 2020-10-09 RX ADMIN — WARFARIN SODIUM 5 MG: 5 TABLET ORAL at 17:00

## 2020-10-09 RX ADMIN — SERTRALINE HYDROCHLORIDE 50 MG: 50 TABLET ORAL at 08:25

## 2020-10-09 RX ADMIN — METOPROLOL TARTRATE 25 MG: 25 TABLET, FILM COATED ORAL at 21:40

## 2020-10-10 LAB
GLUCOSE SERPL-MCNC: 113 MG/DL (ref 65–140)
GLUCOSE SERPL-MCNC: 115 MG/DL (ref 65–140)
GLUCOSE SERPL-MCNC: 158 MG/DL (ref 65–140)
GLUCOSE SERPL-MCNC: 159 MG/DL (ref 65–140)

## 2020-10-10 PROCEDURE — 82948 REAGENT STRIP/BLOOD GLUCOSE: CPT

## 2020-10-10 RX ADMIN — MELATONIN TAB 3 MG 6 MG: 3 TAB at 21:31

## 2020-10-10 RX ADMIN — INSULIN LISPRO 1 UNITS: 100 INJECTION, SOLUTION INTRAVENOUS; SUBCUTANEOUS at 12:25

## 2020-10-10 RX ADMIN — ASPIRIN 81 MG: 81 TABLET, COATED ORAL at 08:20

## 2020-10-10 RX ADMIN — METOPROLOL TARTRATE 25 MG: 25 TABLET, FILM COATED ORAL at 08:18

## 2020-10-10 RX ADMIN — INSULIN LISPRO 1 UNITS: 100 INJECTION, SOLUTION INTRAVENOUS; SUBCUTANEOUS at 21:31

## 2020-10-10 RX ADMIN — SERTRALINE HYDROCHLORIDE 50 MG: 50 TABLET ORAL at 08:20

## 2020-10-10 RX ADMIN — RIVASTIGMINE TRANSDERMAL SYSTEM 4.6 MG: 4.6 PATCH, EXTENDED RELEASE TRANSDERMAL at 08:23

## 2020-10-10 RX ADMIN — POTASSIUM CHLORIDE 20 MEQ: 20 TABLET, EXTENDED RELEASE ORAL at 08:20

## 2020-10-10 RX ADMIN — FOLIC ACID 1 MG: 1 TABLET ORAL at 08:20

## 2020-10-10 RX ADMIN — WARFARIN SODIUM 5 MG: 5 TABLET ORAL at 17:18

## 2020-10-10 RX ADMIN — FAMOTIDINE 10 MG: 20 TABLET ORAL at 08:19

## 2020-10-10 RX ADMIN — LEVOTHYROXINE SODIUM 100 MCG: 100 TABLET ORAL at 06:07

## 2020-10-10 RX ADMIN — CYANOCOBALAMIN TAB 1000 MCG 1000 MCG: 1000 TAB at 08:18

## 2020-10-11 LAB
GLUCOSE SERPL-MCNC: 118 MG/DL (ref 65–140)
GLUCOSE SERPL-MCNC: 124 MG/DL (ref 65–140)
GLUCOSE SERPL-MCNC: 127 MG/DL (ref 65–140)
GLUCOSE SERPL-MCNC: 142 MG/DL (ref 65–140)

## 2020-10-11 PROCEDURE — 82948 REAGENT STRIP/BLOOD GLUCOSE: CPT

## 2020-10-11 RX ADMIN — METOPROLOL TARTRATE 25 MG: 25 TABLET, FILM COATED ORAL at 21:42

## 2020-10-11 RX ADMIN — RIVASTIGMINE TRANSDERMAL SYSTEM 4.6 MG: 4.6 PATCH, EXTENDED RELEASE TRANSDERMAL at 08:58

## 2020-10-11 RX ADMIN — MELATONIN TAB 3 MG 6 MG: 3 TAB at 21:41

## 2020-10-11 RX ADMIN — METOPROLOL TARTRATE 25 MG: 25 TABLET, FILM COATED ORAL at 08:56

## 2020-10-11 RX ADMIN — WARFARIN SODIUM 5 MG: 5 TABLET ORAL at 17:10

## 2020-10-11 RX ADMIN — ASPIRIN 81 MG: 81 TABLET, COATED ORAL at 08:56

## 2020-10-11 RX ADMIN — FAMOTIDINE 10 MG: 20 TABLET ORAL at 09:03

## 2020-10-11 RX ADMIN — LEVOTHYROXINE SODIUM 100 MCG: 100 TABLET ORAL at 06:08

## 2020-10-11 RX ADMIN — FOLIC ACID 1 MG: 1 TABLET ORAL at 08:56

## 2020-10-11 RX ADMIN — SERTRALINE HYDROCHLORIDE 50 MG: 50 TABLET ORAL at 08:56

## 2020-10-11 RX ADMIN — CYANOCOBALAMIN TAB 1000 MCG 1000 MCG: 1000 TAB at 08:56

## 2020-10-11 RX ADMIN — POTASSIUM CHLORIDE 20 MEQ: 20 TABLET, EXTENDED RELEASE ORAL at 08:56

## 2020-10-12 LAB
GLUCOSE SERPL-MCNC: 115 MG/DL (ref 65–140)
GLUCOSE SERPL-MCNC: 141 MG/DL (ref 65–140)
GLUCOSE SERPL-MCNC: 144 MG/DL (ref 65–140)
GLUCOSE SERPL-MCNC: 98 MG/DL (ref 65–140)
SARS-COV-2 IGG+IGM SERPL QL IA: NORMAL

## 2020-10-12 PROCEDURE — 82948 REAGENT STRIP/BLOOD GLUCOSE: CPT

## 2020-10-12 PROCEDURE — 86769 SARS-COV-2 COVID-19 ANTIBODY: CPT | Performed by: PSYCHIATRY & NEUROLOGY

## 2020-10-12 PROCEDURE — 99232 SBSQ HOSP IP/OBS MODERATE 35: CPT | Performed by: PSYCHIATRY & NEUROLOGY

## 2020-10-12 RX ADMIN — FOLIC ACID 1 MG: 1 TABLET ORAL at 08:43

## 2020-10-12 RX ADMIN — METOPROLOL TARTRATE 25 MG: 25 TABLET, FILM COATED ORAL at 08:42

## 2020-10-12 RX ADMIN — MELATONIN TAB 3 MG 6 MG: 3 TAB at 21:48

## 2020-10-12 RX ADMIN — CYANOCOBALAMIN TAB 1000 MCG 1000 MCG: 1000 TAB at 08:43

## 2020-10-12 RX ADMIN — WARFARIN SODIUM 5 MG: 5 TABLET ORAL at 17:10

## 2020-10-12 RX ADMIN — ACETAMINOPHEN 650 MG: 325 TABLET ORAL at 15:59

## 2020-10-12 RX ADMIN — LEVOTHYROXINE SODIUM 100 MCG: 100 TABLET ORAL at 06:22

## 2020-10-12 RX ADMIN — FAMOTIDINE 10 MG: 20 TABLET ORAL at 08:43

## 2020-10-12 RX ADMIN — RIVASTIGMINE TRANSDERMAL SYSTEM 4.6 MG: 4.6 PATCH, EXTENDED RELEASE TRANSDERMAL at 08:44

## 2020-10-12 RX ADMIN — SERTRALINE HYDROCHLORIDE 50 MG: 50 TABLET ORAL at 08:42

## 2020-10-12 RX ADMIN — ASPIRIN 81 MG: 81 TABLET, COATED ORAL at 08:42

## 2020-10-12 RX ADMIN — POTASSIUM CHLORIDE 20 MEQ: 20 TABLET, EXTENDED RELEASE ORAL at 08:43

## 2020-10-13 PROBLEM — I95.1 ORTHOSTATIC HYPOTENSION: Status: ACTIVE | Noted: 2017-08-14

## 2020-10-13 PROBLEM — N17.9 ACUTE ON CHRONIC RENAL FAILURE (HCC): Status: ACTIVE | Noted: 2017-07-27

## 2020-10-13 PROBLEM — N18.9 ACUTE ON CHRONIC RENAL FAILURE (HCC): Status: ACTIVE | Noted: 2017-07-27

## 2020-10-13 LAB
GLUCOSE SERPL-MCNC: 123 MG/DL (ref 65–140)
GLUCOSE SERPL-MCNC: 125 MG/DL (ref 65–140)
GLUCOSE SERPL-MCNC: 129 MG/DL (ref 65–140)
GLUCOSE SERPL-MCNC: 143 MG/DL (ref 65–140)
SARS-COV-2 RNA RESP QL NAA+PROBE: NEGATIVE

## 2020-10-13 PROCEDURE — 82948 REAGENT STRIP/BLOOD GLUCOSE: CPT

## 2020-10-13 PROCEDURE — 87635 SARS-COV-2 COVID-19 AMP PRB: CPT | Performed by: NURSE PRACTITIONER

## 2020-10-13 PROCEDURE — 99232 SBSQ HOSP IP/OBS MODERATE 35: CPT | Performed by: NURSE PRACTITIONER

## 2020-10-13 RX ADMIN — METOPROLOL TARTRATE 25 MG: 25 TABLET, FILM COATED ORAL at 21:41

## 2020-10-13 RX ADMIN — SERTRALINE HYDROCHLORIDE 50 MG: 50 TABLET ORAL at 08:13

## 2020-10-13 RX ADMIN — LEVOTHYROXINE SODIUM 100 MCG: 100 TABLET ORAL at 06:17

## 2020-10-13 RX ADMIN — RIVASTIGMINE TRANSDERMAL SYSTEM 4.6 MG: 4.6 PATCH, EXTENDED RELEASE TRANSDERMAL at 08:14

## 2020-10-13 RX ADMIN — FAMOTIDINE 10 MG: 20 TABLET ORAL at 08:13

## 2020-10-13 RX ADMIN — CYANOCOBALAMIN TAB 1000 MCG 1000 MCG: 1000 TAB at 08:13

## 2020-10-13 RX ADMIN — POTASSIUM CHLORIDE 20 MEQ: 20 TABLET, EXTENDED RELEASE ORAL at 08:13

## 2020-10-13 RX ADMIN — WARFARIN SODIUM 5 MG: 5 TABLET ORAL at 17:19

## 2020-10-13 RX ADMIN — METOPROLOL TARTRATE 25 MG: 25 TABLET, FILM COATED ORAL at 08:13

## 2020-10-13 RX ADMIN — FOLIC ACID 1 MG: 1 TABLET ORAL at 08:13

## 2020-10-13 RX ADMIN — ASPIRIN 81 MG: 81 TABLET, COATED ORAL at 08:13

## 2020-10-13 RX ADMIN — FUROSEMIDE 20 MG: 20 TABLET ORAL at 08:13

## 2020-10-13 RX ADMIN — MELATONIN TAB 3 MG 6 MG: 3 TAB at 21:41

## 2020-10-14 LAB
GLUCOSE SERPL-MCNC: 122 MG/DL (ref 65–140)
GLUCOSE SERPL-MCNC: 128 MG/DL (ref 65–140)
GLUCOSE SERPL-MCNC: 131 MG/DL (ref 65–140)
GLUCOSE SERPL-MCNC: 146 MG/DL (ref 65–140)
GLUCOSE SERPL-MCNC: 148 MG/DL (ref 65–140)

## 2020-10-14 PROCEDURE — 82948 REAGENT STRIP/BLOOD GLUCOSE: CPT

## 2020-10-14 PROCEDURE — 99232 SBSQ HOSP IP/OBS MODERATE 35: CPT | Performed by: NURSE PRACTITIONER

## 2020-10-14 RX ADMIN — FUROSEMIDE 20 MG: 20 TABLET ORAL at 09:10

## 2020-10-14 RX ADMIN — MELATONIN TAB 3 MG 6 MG: 3 TAB at 21:44

## 2020-10-14 RX ADMIN — ASPIRIN 81 MG: 81 TABLET, COATED ORAL at 09:10

## 2020-10-14 RX ADMIN — LEVOTHYROXINE SODIUM 100 MCG: 100 TABLET ORAL at 05:31

## 2020-10-14 RX ADMIN — METOPROLOL TARTRATE 25 MG: 25 TABLET, FILM COATED ORAL at 09:10

## 2020-10-14 RX ADMIN — POTASSIUM CHLORIDE 20 MEQ: 20 TABLET, EXTENDED RELEASE ORAL at 09:09

## 2020-10-14 RX ADMIN — FOLIC ACID 1 MG: 1 TABLET ORAL at 09:10

## 2020-10-14 RX ADMIN — METOPROLOL TARTRATE 25 MG: 25 TABLET, FILM COATED ORAL at 21:45

## 2020-10-14 RX ADMIN — CYANOCOBALAMIN TAB 1000 MCG 1000 MCG: 1000 TAB at 09:10

## 2020-10-14 RX ADMIN — WARFARIN SODIUM 5 MG: 5 TABLET ORAL at 17:12

## 2020-10-14 RX ADMIN — RIVASTIGMINE TRANSDERMAL SYSTEM 4.6 MG: 4.6 PATCH, EXTENDED RELEASE TRANSDERMAL at 09:11

## 2020-10-14 RX ADMIN — SERTRALINE HYDROCHLORIDE 50 MG: 50 TABLET ORAL at 09:10

## 2020-10-14 RX ADMIN — FAMOTIDINE 10 MG: 20 TABLET ORAL at 09:10

## 2020-10-15 LAB
GLUCOSE SERPL-MCNC: 104 MG/DL (ref 65–140)
GLUCOSE SERPL-MCNC: 111 MG/DL (ref 65–140)
GLUCOSE SERPL-MCNC: 113 MG/DL (ref 65–140)
GLUCOSE SERPL-MCNC: 120 MG/DL (ref 65–140)

## 2020-10-15 PROCEDURE — 97168 OT RE-EVAL EST PLAN CARE: CPT

## 2020-10-15 PROCEDURE — 97164 PT RE-EVAL EST PLAN CARE: CPT

## 2020-10-15 PROCEDURE — 99232 SBSQ HOSP IP/OBS MODERATE 35: CPT | Performed by: NURSE PRACTITIONER

## 2020-10-15 PROCEDURE — 97530 THERAPEUTIC ACTIVITIES: CPT

## 2020-10-15 PROCEDURE — 82948 REAGENT STRIP/BLOOD GLUCOSE: CPT

## 2020-10-15 RX ADMIN — METOPROLOL TARTRATE 25 MG: 25 TABLET, FILM COATED ORAL at 08:11

## 2020-10-15 RX ADMIN — POTASSIUM CHLORIDE 20 MEQ: 20 TABLET, EXTENDED RELEASE ORAL at 08:11

## 2020-10-15 RX ADMIN — FOLIC ACID 1 MG: 1 TABLET ORAL at 08:11

## 2020-10-15 RX ADMIN — RIVASTIGMINE TRANSDERMAL SYSTEM 4.6 MG: 4.6 PATCH, EXTENDED RELEASE TRANSDERMAL at 08:10

## 2020-10-15 RX ADMIN — WARFARIN SODIUM 5 MG: 5 TABLET ORAL at 17:19

## 2020-10-15 RX ADMIN — ASPIRIN 81 MG: 81 TABLET, COATED ORAL at 08:11

## 2020-10-15 RX ADMIN — METOPROLOL TARTRATE 25 MG: 25 TABLET, FILM COATED ORAL at 21:54

## 2020-10-15 RX ADMIN — FAMOTIDINE 10 MG: 20 TABLET ORAL at 08:09

## 2020-10-15 RX ADMIN — LEVOTHYROXINE SODIUM 100 MCG: 100 TABLET ORAL at 06:24

## 2020-10-15 RX ADMIN — MELATONIN TAB 3 MG 6 MG: 3 TAB at 21:54

## 2020-10-15 RX ADMIN — SERTRALINE HYDROCHLORIDE 50 MG: 50 TABLET ORAL at 08:11

## 2020-10-15 RX ADMIN — CYANOCOBALAMIN TAB 1000 MCG 1000 MCG: 1000 TAB at 08:11

## 2020-10-15 RX ADMIN — FUROSEMIDE 20 MG: 20 TABLET ORAL at 08:11

## 2020-10-16 VITALS
DIASTOLIC BLOOD PRESSURE: 75 MMHG | HEART RATE: 60 BPM | RESPIRATION RATE: 16 BRPM | BODY MASS INDEX: 37.57 KG/M2 | WEIGHT: 191.36 LBS | OXYGEN SATURATION: 95 % | SYSTOLIC BLOOD PRESSURE: 146 MMHG | HEIGHT: 60 IN | TEMPERATURE: 98.6 F

## 2020-10-16 LAB
GLUCOSE SERPL-MCNC: 108 MG/DL (ref 65–140)
GLUCOSE SERPL-MCNC: 114 MG/DL (ref 65–140)
GLUCOSE SERPL-MCNC: 95 MG/DL (ref 65–140)
SARS-COV-2 RNA RESP QL NAA+PROBE: NEGATIVE

## 2020-10-16 PROCEDURE — 87635 SARS-COV-2 COVID-19 AMP PRB: CPT | Performed by: NURSE PRACTITIONER

## 2020-10-16 PROCEDURE — 99238 HOSP IP/OBS DSCHRG MGMT 30/<: CPT | Performed by: PSYCHIATRY & NEUROLOGY

## 2020-10-16 PROCEDURE — 82948 REAGENT STRIP/BLOOD GLUCOSE: CPT

## 2020-10-16 PROCEDURE — 97530 THERAPEUTIC ACTIVITIES: CPT

## 2020-10-16 PROCEDURE — 97535 SELF CARE MNGMENT TRAINING: CPT

## 2020-10-16 RX ORDER — NITROGLYCERIN 0.4 MG/1
0.4 TABLET SUBLINGUAL
Qty: 30 TABLET | Refills: 0 | Status: SHIPPED | OUTPATIENT
Start: 2020-10-16

## 2020-10-16 RX ORDER — FOLIC ACID 1 MG/1
1 TABLET ORAL DAILY
Qty: 30 TABLET | Refills: 0 | Status: SHIPPED | OUTPATIENT
Start: 2020-10-16

## 2020-10-16 RX ORDER — LANOLIN ALCOHOL/MO/W.PET/CERES
1000 CREAM (GRAM) TOPICAL DAILY
Qty: 30 TABLET | Refills: 0 | Status: SHIPPED | OUTPATIENT
Start: 2020-10-16

## 2020-10-16 RX ORDER — FUROSEMIDE 20 MG/1
20 TABLET ORAL DAILY
Qty: 30 TABLET | Refills: 0 | Status: SHIPPED | OUTPATIENT
Start: 2020-10-16

## 2020-10-16 RX ORDER — ASPIRIN 81 MG/1
81 TABLET ORAL DAILY
Qty: 30 TABLET | Refills: 0 | Status: SHIPPED | OUTPATIENT
Start: 2020-10-16

## 2020-10-16 RX ORDER — LEVOTHYROXINE SODIUM 0.1 MG/1
100 TABLET ORAL
Qty: 30 TABLET | Refills: 0 | Status: SHIPPED | OUTPATIENT
Start: 2020-10-16

## 2020-10-16 RX ORDER — NYSTATIN 100000 [USP'U]/G
POWDER TOPICAL 2 TIMES DAILY
Status: DISCONTINUED | OUTPATIENT
Start: 2020-10-16 | End: 2020-10-16 | Stop reason: HOSPADM

## 2020-10-16 RX ORDER — LANOLIN ALCOHOL/MO/W.PET/CERES
6 CREAM (GRAM) TOPICAL
Qty: 60 TABLET | Refills: 0 | Status: SHIPPED | OUTPATIENT
Start: 2020-10-16

## 2020-10-16 RX ORDER — FAMOTIDINE 10 MG
10 TABLET ORAL DAILY
Qty: 30 TABLET | Refills: 0 | Status: SHIPPED | OUTPATIENT
Start: 2020-10-17

## 2020-10-16 RX ORDER — FAMOTIDINE 20 MG/1
20 TABLET, FILM COATED ORAL 2 TIMES DAILY
Qty: 60 TABLET | Refills: 0 | Status: SHIPPED | OUTPATIENT
Start: 2020-10-16 | End: 2021-01-10 | Stop reason: SDUPTHER

## 2020-10-16 RX ORDER — WARFARIN SODIUM 5 MG/1
TABLET ORAL
Qty: 30 TABLET | Refills: 0 | Status: SHIPPED | OUTPATIENT
Start: 2020-10-16

## 2020-10-16 RX ORDER — POTASSIUM CHLORIDE 20 MEQ/1
20 TABLET, EXTENDED RELEASE ORAL DAILY
Qty: 30 TABLET | Refills: 0 | Status: SHIPPED | OUTPATIENT
Start: 2020-10-16

## 2020-10-16 RX ADMIN — METOPROLOL TARTRATE 25 MG: 25 TABLET, FILM COATED ORAL at 08:25

## 2020-10-16 RX ADMIN — LEVOTHYROXINE SODIUM 100 MCG: 100 TABLET ORAL at 05:38

## 2020-10-16 RX ADMIN — FOLIC ACID 1 MG: 1 TABLET ORAL at 08:25

## 2020-10-16 RX ADMIN — CYANOCOBALAMIN TAB 1000 MCG 1000 MCG: 1000 TAB at 08:25

## 2020-10-16 RX ADMIN — WARFARIN SODIUM 5 MG: 5 TABLET ORAL at 17:08

## 2020-10-16 RX ADMIN — RIVASTIGMINE TRANSDERMAL SYSTEM 4.6 MG: 4.6 PATCH, EXTENDED RELEASE TRANSDERMAL at 08:27

## 2020-10-16 RX ADMIN — FAMOTIDINE 10 MG: 20 TABLET ORAL at 08:26

## 2020-10-16 RX ADMIN — LORAZEPAM 1 MG: 1 TABLET ORAL at 17:40

## 2020-10-16 RX ADMIN — ASPIRIN 81 MG: 81 TABLET, COATED ORAL at 08:25

## 2020-10-16 RX ADMIN — SERTRALINE HYDROCHLORIDE 50 MG: 50 TABLET ORAL at 08:25

## 2020-10-16 RX ADMIN — POTASSIUM CHLORIDE 20 MEQ: 20 TABLET, EXTENDED RELEASE ORAL at 08:25

## 2020-10-16 RX ADMIN — NYSTATIN: 100000 POWDER TOPICAL at 17:08

## 2020-12-11 ENCOUNTER — NURSING HOME VISIT (OUTPATIENT)
Dept: GERIATRICS | Facility: OTHER | Age: 85
End: 2020-12-11
Payer: COMMERCIAL

## 2020-12-11 DIAGNOSIS — E03.9 ACQUIRED HYPOTHYROIDISM: Primary | ICD-10-CM

## 2020-12-11 DIAGNOSIS — E11.9 DIABETES MELLITUS TYPE 2, NONINSULIN DEPENDENT (HCC): ICD-10-CM

## 2020-12-11 DIAGNOSIS — I48.0 PAROXYSMAL ATRIAL FIBRILLATION (HCC): ICD-10-CM

## 2020-12-11 DIAGNOSIS — F03.90: ICD-10-CM

## 2020-12-11 DIAGNOSIS — I10 ESSENTIAL HYPERTENSION: ICD-10-CM

## 2020-12-11 DIAGNOSIS — N18.31 STAGE 3A CHRONIC KIDNEY DISEASE (HCC): ICD-10-CM

## 2020-12-11 DIAGNOSIS — K21.9 GASTROESOPHAGEAL REFLUX DISEASE WITHOUT ESOPHAGITIS: ICD-10-CM

## 2020-12-11 PROCEDURE — 99305 1ST NF CARE MODERATE MDM 35: CPT | Performed by: FAMILY MEDICINE

## 2021-01-09 PROBLEM — N17.9 ACUTE ON CHRONIC RENAL FAILURE (HCC): Status: RESOLVED | Noted: 2017-07-27 | Resolved: 2021-01-09

## 2021-01-09 PROBLEM — I50.9 CHRONIC CONGESTIVE HEART FAILURE (HCC): Status: ACTIVE | Noted: 2021-01-09

## 2021-01-09 PROBLEM — N18.9 ACUTE ON CHRONIC RENAL FAILURE (HCC): Status: RESOLVED | Noted: 2017-07-27 | Resolved: 2021-01-09

## 2021-01-10 ENCOUNTER — NURSING HOME VISIT (OUTPATIENT)
Dept: GERIATRICS | Facility: OTHER | Age: 86
End: 2021-01-10
Payer: COMMERCIAL

## 2021-01-10 DIAGNOSIS — I10 ESSENTIAL HYPERTENSION: ICD-10-CM

## 2021-01-10 DIAGNOSIS — I48.0 PAROXYSMAL ATRIAL FIBRILLATION (HCC): ICD-10-CM

## 2021-01-10 DIAGNOSIS — E11.9 DIABETES MELLITUS TYPE 2, NONINSULIN DEPENDENT (HCC): ICD-10-CM

## 2021-01-10 DIAGNOSIS — K21.9 GASTROESOPHAGEAL REFLUX DISEASE WITHOUT ESOPHAGITIS: ICD-10-CM

## 2021-01-10 DIAGNOSIS — M54.50 ACUTE RIGHT-SIDED LOW BACK PAIN WITHOUT SCIATICA: ICD-10-CM

## 2021-01-10 DIAGNOSIS — F03.90: Primary | ICD-10-CM

## 2021-01-10 DIAGNOSIS — N18.31 STAGE 3A CHRONIC KIDNEY DISEASE (HCC): ICD-10-CM

## 2021-01-10 DIAGNOSIS — I50.9 CHRONIC CONGESTIVE HEART FAILURE, UNSPECIFIED HEART FAILURE TYPE (HCC): ICD-10-CM

## 2021-01-10 DIAGNOSIS — E03.9 ACQUIRED HYPOTHYROIDISM: ICD-10-CM

## 2021-01-10 PROCEDURE — 99309 SBSQ NF CARE MODERATE MDM 30: CPT | Performed by: FAMILY MEDICINE

## 2021-01-10 RX ORDER — ATORVASTATIN CALCIUM 10 MG/1
10 TABLET, FILM COATED ORAL DAILY
COMMUNITY

## 2021-01-10 NOTE — ASSESSMENT & PLAN NOTE
Lab Results   Component Value Date    HGBA1C 6 2 (H) 07/16/2020    diet controlled  Will continue with monitoring  Stable

## 2021-01-10 NOTE — ASSESSMENT & PLAN NOTE
On Tylenol   Will add Icy-hot cream with close monitoring due to significant medications allergy list

## 2021-01-10 NOTE — PROGRESS NOTES
[facility]  [address]  [phone]  Facility:[place] [address]          NAME: Cecilia Zhou  AGE: 82 y o  SEX: female    DATE OF ENCOUNTER: 1/10/2021    Chief Complaint     Right lower back/hip pain     History of Present Illness     HPI    The following portions of the patient's history were reviewed and updated as appropriate (from facility chart and hospital records): allergies, current medications, past family history, past medical history, past social history, past surgical history and problem list   Pt was seen and examined for f/u on neurocognitive disorder, HTN, Hypothyroidism, CKD, GERD, DM, CHF, Afib  Pt has been stable  BP stable  HR controlled  BS stable  No behaviors  No other specific issues or concerns  BS stable  Last PT/INR last week WTl  Pt ambulated using walker  No CHF exacerbation  Last routine labs done on 12/16, with normal TSH, GFR of 41  Pt has been reporting some R lower back/r hip pain at times  She states that tylenol as been helping  States that she can't sleep some nights  No other specific issues or concerns  Review of Systems     Review of Systems   Constitutional: Negative  HENT: Negative  Eyes: Negative  Respiratory: Negative  Cardiovascular: Negative  Gastrointestinal: Negative  Endocrine: Negative  Genitourinary: Negative  Musculoskeletal: Negative  R lower back/upper hip pain   Skin: Negative  Allergic/Immunologic: Negative  Neurological: Negative  Hematological: Negative  Psychiatric/Behavioral: Negative  All other systems reviewed and are negative        Active Problem List     Patient Active Problem List   Diagnosis    Diabetes mellitus type 2, noninsulin dependent ([address] )    Acquired hypothyroidism    Coronary artery disease involving native coronary artery of native heart without angina pectoris    Paroxysmal atrial fibrillation (HCC)    Ischemic cardiomyopathy    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[facility] [address] )    Major neurocognitive disorder, rule out dementia with Lewy bodies    CKD (chronic kidney disease) stage 3, GFR 30-59 ml/min    Acute encephalopathy    Hypokalemia    Medical clearance for psychiatric admission    GERD (gastroesophageal reflux disease)    History of CHF (congestive heart failure)    Actinic keratoses    Facial basal cell cancer    Anaphylactic reaction    Hereditary and idiopathic peripheral neuropathy    Hyperlipidemia    Hypertension    Orthostatic hypotension    Osteoarthritis of knee    Sensorineural hearing loss (SNHL) of both ears    Sleep apnea    Syncope    Vestibular disequilibrium involving both inner ears    Chronic congestive heart failure (HCC)    Acute right-sided low back pain without sciatica       Objective     Vitals: wt:186 1Ibs        BP:138/76      BS log reviewed  Afebrile       Physical Exam  Vitals signs and nursing note reviewed  Constitutional:       General: She is not in acute distress  Appearance: Normal appearance  She is well-developed  She is not ill-appearing, toxic-appearing or diaphoretic  HENT:      Head: Normocephalic and atraumatic  Right Ear: External ear normal       Left Ear: External ear normal       Nose: Nose normal  No congestion or rhinorrhea  Mouth/Throat:      Pharynx: No oropharyngeal exudate or posterior oropharyngeal erythema  Eyes:      General: No scleral icterus  Right eye: No discharge  Left eye: No discharge  Conjunctiva/sclera: Conjunctivae normal       Pupils: Pupils are equal, round, and reactive to light  Neck:      Musculoskeletal: Normal range of motion and neck supple  No neck rigidity or muscular tenderness  Cardiovascular:      Rate and Rhythm: Normal rate  Rhythm irregular  Heart sounds: Normal heart sounds  No murmur  No friction rub  No gallop  Comments: Irregular, irregular     Pulmonary:      Effort: Pulmonary effort is normal  No respiratory distress  Breath sounds: Normal breath sounds  No stridor  No wheezing, rhonchi or rales  Chest:      Chest wall: No tenderness  Abdominal:      General: Bowel sounds are normal  There is no distension  Palpations: Abdomen is soft  There is no mass  Tenderness: There is no abdominal tenderness  There is no guarding or rebound  Hernia: No hernia is present  Musculoskeletal: Normal range of motion  General: No swelling, tenderness, deformity or signs of injury  Right lower leg: Edema present  Left lower leg: Edema present  Comments: Stood up, good balance  Negative leg raise test     Lymphadenopathy:      Cervical: No cervical adenopathy  Skin:     General: Skin is warm and dry  Coloration: Skin is not jaundiced or pale  Findings: No bruising, erythema, lesion or rash  Neurological:      Mental Status: She is alert and oriented to person, place, and time  Cranial Nerves: Cranial nerve deficit (slight Lime  ) present  Comments: Forgetful  Pertinent Laboratory/Diagnostic Studies:  12/16, FLP, TSH, CMP, CBC    Current Medications   Medication list in facility chart was reviewed and necessary changes made  Assessment and Plan     Major neurocognitive disorder, rule out dementia with Lewy bodies  Needs NH care, no behaviors, wt stable  On rivastigmine patch  Acquired hypothyroidism  On Levothyroxine, last TSH done on 12/16 stable  Paroxysmal atrial fibrillation (HCC)  HR controlled with Metoprolol  Coumadin WTL, next PT/INR on 1/20/2021  Hypertension  BP controlled with Metoprolol  Last CMP on 12/16 stable  CKD (chronic kidney disease) stage 3, GFR 30-59 ml/min (HCC)  Will continue with monitoring  Stable  Last lab on 12/16 stable  Chronic congestive heart failure (HCC)  Wt stable, asymptomatic  On Lasix  Will continue with monitoring  Labs stable on 12/16/2020          GERD (gastroesophageal reflux disease)  On Famotidine, asymptomatic, last lab on 12/16 stable CBC  Diabetes mellitus type 2, noninsulin dependent ([facility] [address] )    Lab Results   Component Value Date    HGBA1C 6 2 (H) 07/16/2020    diet controlled  Will continue with monitoring  Stable  Acute right-sided low back pain without sciatica  On Tylenol   Will add Icy-hot cream with close monitoring due to significant medications allergy list        -Dania Reed MD

## 2021-01-27 ENCOUNTER — NURSING HOME VISIT (OUTPATIENT)
Dept: GERIATRICS | Facility: OTHER | Age: 86
End: 2021-01-27
Payer: COMMERCIAL

## 2021-01-27 DIAGNOSIS — F29 ATYPICAL PSYCHOSIS (HCC): Primary | ICD-10-CM

## 2021-01-27 PROCEDURE — 99308 SBSQ NF CARE LOW MDM 20: CPT | Performed by: FAMILY MEDICINE

## 2021-01-27 NOTE — ASSESSMENT & PLAN NOTE
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
## 2021-02-02 ENCOUNTER — TELEPHONE (OUTPATIENT)
Dept: OTHER | Facility: OTHER | Age: 86
End: 2021-02-02

## 2021-02-03 ENCOUNTER — NURSING HOME VISIT (OUTPATIENT)
Dept: GERIATRICS | Facility: OTHER | Age: 86
End: 2021-02-03
Payer: COMMERCIAL

## 2021-02-03 DIAGNOSIS — N18.31 STAGE 3A CHRONIC KIDNEY DISEASE (HCC): Primary | ICD-10-CM

## 2021-02-03 DIAGNOSIS — I48.0 PAROXYSMAL ATRIAL FIBRILLATION (HCC): ICD-10-CM

## 2021-02-03 DIAGNOSIS — F29 ATYPICAL PSYCHOSIS (HCC): ICD-10-CM

## 2021-02-03 DIAGNOSIS — N30.00 ACUTE CYSTITIS WITHOUT HEMATURIA: ICD-10-CM

## 2021-02-03 PROBLEM — R44.1 VISUAL HALLUCINATIONS: Status: ACTIVE | Noted: 2021-02-03

## 2021-02-03 PROCEDURE — 99309 SBSQ NF CARE MODERATE MDM 30: CPT | Performed by: FAMILY MEDICINE
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
## 2021-02-03 NOTE — ASSESSMENT & PLAN NOTE
On Bactrim, will extend treatment to 5 days total, but will change Bactrim DS to [facility] monitoring

## 2021-02-03 NOTE — TELEPHONE ENCOUNTER
[address] from [facility] Pt  Tyleryanci [place] : 1935 called in to report her fall  Pt  is being monitored every 15 mins and is doing well

## 2021-02-03 NOTE — ASSESSMENT & PLAN NOTE
Some decline in GFR and renal function, today would be her last day of 3 days Bactrim treatment, will change Bactrim DS to SS for 3 more days (5 total doses), and will recheck BMP on 2/25/2021

## 2021-02-03 NOTE — ASSESSMENT & PLAN NOTE
HR controlled with Metoprolol, coumadin slightly at higher side of therapeutic level  Decreased coumadin diose to 4mg  Since pt is on antibiotic  Repeat PT/INR on 2/5/2021

## 2021-02-08 ENCOUNTER — NURSING HOME VISIT (OUTPATIENT)
Dept: GERIATRICS | Facility: OTHER | Age: 86
End: 2021-02-08
Payer: COMMERCIAL

## 2021-02-08 DIAGNOSIS — N30.00 ACUTE CYSTITIS WITHOUT HEMATURIA: ICD-10-CM

## 2021-02-08 DIAGNOSIS — I48.0 PAROXYSMAL ATRIAL FIBRILLATION (HCC): ICD-10-CM

## 2021-02-08 DIAGNOSIS — H57.89 IRRITATION OF LEFT EYE: Primary | ICD-10-CM

## 2021-02-08 DIAGNOSIS — N18.31 STAGE 3A CHRONIC KIDNEY DISEASE (HCC): ICD-10-CM

## 2021-02-08 PROCEDURE — 99308 SBSQ NF CARE LOW MDM 20: CPT | Performed by: FAMILY MEDICINE

## 2021-02-08 NOTE — ASSESSMENT & PLAN NOTE
PT/INR WTL, her coumadin dose was decreased while on antibiotic, will change back to her base 5mg daily and recheck levels on Friday this week  Close monitoring, HR is controlled

## 2021-02-08 NOTE — PROGRESS NOTES
[facility]  [address]  [phone]  Facility:-[place]          NAME: Kaley Mcgill  AGE: 82 y o  SEX: female    DATE OF ENCOUNTER: 2/8/2021    Chief Complaint     "My eye is itchy"    History of Present Illness     HPI    The following portions of the patient's history were reviewed and updated as appropriate (from facility chart and hospital records): allergies, current medications, past family history, past medical history, past social history, past surgical history and problem list  Pt was seen and examined per staff report of pt's complaint of left eye irritation and redness  Pt reports that her left eye is itchy, no vision change  No drainage, no trauma  Pt also had PT/INR, and BMP done today which were reviewed by the phone  GFR slightly better but not at goal yet  Pt's Bactrim was DC'd 1-2 days sooner than end date due to renal function decline  Coumadin is WTL  Review of Systems     Review of Systems   Eyes: Positive for itching  Negative for photophobia, pain, discharge, redness and visual disturbance          "I have dry eyes", and Left eye itchiness        Active Problem List     Patient Active Problem List   Diagnosis    Diabetes mellitus type 2, noninsulin dependent ([address] )    Acquired hypothyroidism    Coronary artery disease involving native coronary artery of native heart without angina pectoris    Paroxysmal atrial fibrillation ([address] )    Ischemic cardiomyopathy    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[address] )    Major neurocognitive disorder, rule out dementia with Lewy bodies    CKD (chronic kidney disease) stage 3, GFR 30-59 ml/min    Acute encephalopathy    Hypokalemia    Medical clearance for psychiatric admission    GERD (gastroesophageal reflux disease)    History of CHF (congestive heart failure)    Actinic keratoses    Facial basal cell cancer    Anaphylactic reaction    Hereditary and idiopathic peripheral neuropathy    Hyperlipidemia    Hypertension    Orthostatic hypotension    Osteoarthritis of knee    Sensorineural hearing loss (SNHL) of both ears    Sleep apnea    Syncope    Vestibular disequilibrium involving both inner ears    Chronic congestive heart failure (HCC)    Acute right-sided low back pain without sciatica    Visual hallucinations    Acute cystitis    Irritation of left eye       Objective     Vitals: afebrile     Physical Exam  Skin:     Findings: Erythema (slighth pink discoloration of left lower eyelid medially to the corner, and Left upper eyelid small pink patchy erythema on inner side ) present  Pertinent Laboratory/Diagnostic Studies:  PT/INR and BMP reviewed     Current Medications   Medication list in facility chart was reviewed and necessary changes made  Assessment and Plan   Irritation of left eye  Combination of dry eye and possible allergy  Will scheduled her artificial eye drops ot QID, extending Claritin to 14 more days  Close monitoring  Paroxysmal atrial fibrillation (HCC)  PT/INR WTL, her coumadin dose was decreased while on antibiotic, will change back to her base 5mg daily and recheck levels on Friday this week  Close monitoring, HR is controlled  Acute cystitis  Off antibiotic, no more hallucinations  Stable and back to her base  CKD (chronic kidney disease) stage 3, GFR 30-59 ml/min ([facility])  With ANTHONY, off Bactrim, recheck labs on this Friday on 2/12/2021           Sarthak Miller MD  2/8/20211:02 PM

## 2021-02-08 NOTE — ASSESSMENT & PLAN NOTE
Combination of dry eye and possible allergy  Will scheduled her artificial eye drops ot QID, extending Claritin to 14 more days  Close monitoring

## 2021-02-15 ENCOUNTER — NURSING HOME VISIT (OUTPATIENT)
Dept: GERIATRICS | Facility: OTHER | Age: 86
End: 2021-02-15
Payer: COMMERCIAL

## 2021-02-15 DIAGNOSIS — G89.29 CHRONIC RIGHT-SIDED LOW BACK PAIN WITHOUT SCIATICA: ICD-10-CM

## 2021-02-15 DIAGNOSIS — M54.50 CHRONIC RIGHT-SIDED LOW BACK PAIN WITHOUT SCIATICA: ICD-10-CM

## 2021-02-15 DIAGNOSIS — F03.90: Primary | ICD-10-CM

## 2021-02-15 DIAGNOSIS — N18.31 STAGE 3A CHRONIC KIDNEY DISEASE (HCC): ICD-10-CM

## 2021-02-15 DIAGNOSIS — I48.0 PAROXYSMAL ATRIAL FIBRILLATION (HCC): ICD-10-CM

## 2021-02-15 DIAGNOSIS — E03.9 ACQUIRED HYPOTHYROIDISM: ICD-10-CM

## 2021-02-15 PROCEDURE — 99309 SBSQ NF CARE MODERATE MDM 30: CPT | Performed by: FAMILY MEDICINE

## 2021-02-15 RX ORDER — RIVASTIGMINE 4.6 MG/24H
1 PATCH, EXTENDED RELEASE TRANSDERMAL DAILY
COMMUNITY
End: 2021-03-08 | Stop reason: CLARIF

## 2021-02-15 NOTE — PROGRESS NOTES
[facility]  [address]  [phone]  Facility:[place] [address]          NAME: Long Preston  AGE: 82 y o  SEX: female    DATE OF ENCOUNTER: 2/15/2021    Chief Complaint     Pt has no new complaint     History of Present Illness     HPI    The following portions of the patient's history were reviewed and updated as appropriate (from facility chart and hospital records): allergies, current medications, past family history, past medical history, past social history, past surgical history and problem list  Pt was seen and examined for f/u on Afib, Acute cystitis, Dementia, CKD, and psychosis  Pt has been stable  Has finished antibiotic course  No more hallucinations  Still some back pain but states that the "cream" was helping, and therapy is helping as well  Walks using her walker  Last BMP on 2/12 was back to base  No behaviors  HR controlled  Her eye irritation resolved  No other specific issues or concerns  Last TSH done in December  Review of Systems     Review of Systems   Constitutional: Negative  HENT: Negative  Eyes: Negative  Respiratory: Negative  Cardiovascular: Negative  Gastrointestinal: Negative  Endocrine: Negative  Genitourinary: Negative  Musculoskeletal: Negative  Skin: Negative  Negative for color change and rash  Allergic/Immunologic: Negative  Neurological: Negative  Hematological: Negative  Psychiatric/Behavioral: Negative  All other systems reviewed and are negative        Active Problem List     Patient Active Problem List   Diagnosis    Diabetes mellitus type 2, noninsulin dependent ([address] )    Acquired hypothyroidism    Coronary artery disease involving native coronary artery of native heart without angina pectoris    Paroxysmal atrial fibrillation (HCC)    Ischemic cardiomyopathy    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[address] )    Major neurocognitive disorder, rule out dementia with Lewy bodies    CKD (chronic kidney disease) stage 3, GFR 30-59 ml/min    Acute encephalopathy    Hypokalemia    Medical clearance for psychiatric admission    GERD (gastroesophageal reflux disease)    History of CHF (congestive heart failure)    Actinic keratoses    Facial basal cell cancer    Anaphylactic reaction    Hereditary and idiopathic peripheral neuropathy    Hyperlipidemia    Hypertension    Orthostatic hypotension    Osteoarthritis of knee    Sensorineural hearing loss (SNHL) of both ears    Sleep apnea    Syncope    Vestibular disequilibrium involving both inner ears    Chronic congestive heart failure (HCC)    Chronic right-sided low back pain without sciatica    Visual hallucinations    Acute cystitis    Irritation of left eye       Objective     Vitals: wt:188  2Ibs     BP;11/64      Afebrile     NY:76    Physical Exam  Vitals signs and nursing note reviewed  Constitutional:       General: She is not in acute distress  Appearance: Normal appearance  She is well-developed  She is not ill-appearing, toxic-appearing or diaphoretic  HENT:      Head: Normocephalic and atraumatic  Right Ear: External ear normal       Left Ear: External ear normal       Nose: Nose normal       Mouth/Throat:      Mouth: Mucous membranes are moist    Eyes:      General: No scleral icterus  Right eye: No discharge  Left eye: No discharge  Conjunctiva/sclera: Conjunctivae normal       Pupils: Pupils are equal, round, and reactive to light  Neck:      Musculoskeletal: Normal range of motion and neck supple  No neck rigidity or muscular tenderness  Cardiovascular:      Rate and Rhythm: Normal rate  Rhythm irregular  Heart sounds: Normal heart sounds  No murmur  No friction rub  No gallop  Comments: Irregular, irregular  Pulmonary:      Effort: Pulmonary effort is normal  No respiratory distress  Breath sounds: Normal breath sounds  No stridor  No wheezing, rhonchi or rales     Chest: Chest wall: No tenderness  Abdominal:      General: Bowel sounds are normal  There is no distension  Palpations: Abdomen is soft  There is no mass  Tenderness: There is no abdominal tenderness  There is no guarding or rebound  Hernia: No hernia is present  Genitourinary:     Comments: Deferred  Musculoskeletal: Normal range of motion  General: No swelling, tenderness, deformity or signs of injury  Right lower leg: Edema (trace ) present  Left lower leg: Edema (trace ) present  Lymphadenopathy:      Cervical: No cervical adenopathy  Skin:     General: Skin is warm and dry  Coloration: Skin is not jaundiced or pale  Findings: No bruising, erythema, lesion or rash  Comments: Didn't examine sacral area  Neurological:      Mental Status: She is alert and oriented to person, place, and time  Cranial Nerves: Cranial nerve deficit present  Comments: Forgetful  Follows commands  Psychiatric:         Behavior: Behavior normal          Pertinent Laboratory/Diagnostic Studies:  2/12: PT/INR,BMP  1/27:CBCC, TSH     Current Medications   Medication list in facility chart was reviewed and necessary changes made  Assessment and Plan   Major neurocognitive disorder, rule out dementia with Lewy bodies  No more hallucinations, stable  On Rivastigmine  Will continue with monitoring  Acquired hypothyroidism  On Levothyroxine, last TSH don in December  Stable  CKD (chronic kidney disease) stage 3, GFR 30-59 ml/min (HCC)  BMP on 2/12 back to base, will continue with monitoring  Paroxysmal atrial fibrillation (HCC)  HR controlled with Metoprolol, on Coumadin  Last PT/INR done on 2/12/ Will continue with monitoring  Chronic right-sided low back pain without sciatica  Stable with ICY-Hot, Tylenol, and therapy         Dania Reed MD  3/84/34012:51 PM

## 2021-02-15 NOTE — ASSESSMENT & PLAN NOTE
Patient just finished round of antibiotics yesterday for infected fistula. Aware she may be rescheduled   Stable with ICY-Hot, Tylenol, and therapy

## 2021-02-15 NOTE — ASSESSMENT & PLAN NOTE
HR controlled with Metoprolol, on Coumadin  Last PT/INR done on 2/12/ Will continue with monitoring

## 2021-03-08 ENCOUNTER — NURSING HOME VISIT (OUTPATIENT)
Dept: GERIATRICS | Facility: OTHER | Age: 86
End: 2021-03-08
Payer: COMMERCIAL

## 2021-03-08 DIAGNOSIS — R21 SKIN RASH: Primary | ICD-10-CM

## 2021-03-08 PROCEDURE — 99308 SBSQ NF CARE LOW MDM 20: CPT | Performed by: FAMILY MEDICINE

## 2021-03-08 RX ORDER — LORATADINE 10 MG/1
10 TABLET ORAL DAILY
COMMUNITY

## 2021-03-08 RX ORDER — RIVASTIGMINE TARTRATE 1.5 MG/1
1.5 CAPSULE ORAL 2 TIMES DAILY
COMMUNITY

## 2021-03-08 NOTE — PROGRESS NOTES
[facility]  [address]  [phone]  Facility: [place] [address]          NAME: Long Preston  AGE: 82 y o  SEX: female    DATE OF ENCOUNTER: 3/8/2021    Chief Complaint     Skin rash and pruritus     History of Present Illness     HPI    The following portions of the patient's history were reviewed and updated as appropriate (from facility chart and hospital records): allergies, current medications, past family history, past medical history, past social history, past surgical history and problem list   Pt was seen and examined per staff report of skin change and rash which was started last week with pt having circular raised erythematous area on her back on the sites of her Exelon patch, and changing sites on daily bases has not resolved the problem, and pt has been taking them off or refusing it for last 2 nights    Pt also reports that her mid chest area has some rash that is very itchy and she thinks it could be the "bugs" that she sees on the ceiling at nights  Pt also reports that she still sees the kid at night in her room but their father is not there and not bothering her  Pt has significant list of medication allergies including adhesive tapes  Pt's Calritin order is sherman has been fallen off  Review of Systems     Review of Systems   Skin: Positive for rash (skin rash and itchiness )  Psychiatric/Behavioral: Positive for hallucinations (visual, seeing little kids sitting in her room, and bugs on ceiling )         Active Problem List     Patient Active Problem List   Diagnosis    Diabetes mellitus type 2, noninsulin dependent ([facility] [address] )    Acquired hypothyroidism    Coronary artery disease involving native coronary artery of native heart without angina pectoris    Paroxysmal atrial fibrillation (HCC)    Ischemic cardiomyopathy    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[address] )    Major neurocognitive disorder, rule out dementia with Lewy bodies    CKD (chronic kidney disease) stage 3, GFR 30-59 ml/min    Acute encephalopathy    Hypokalemia    Medical clearance for psychiatric admission    GERD (gastroesophageal reflux disease)    History of CHF (congestive heart failure)    Actinic keratoses    Facial basal cell cancer    Anaphylactic reaction    Hereditary and idiopathic peripheral neuropathy    Hyperlipidemia    Hypertension    Orthostatic hypotension    Osteoarthritis of knee    Sensorineural hearing loss (SNHL) of both ears    Sleep apnea    Syncope    Vestibular disequilibrium involving both inner ears    Chronic congestive heart failure (HCC)    Chronic right-sided low back pain without sciatica    Visual hallucinations    Acute cystitis    Irritation of left eye    Skin rash       Objective     Vitals:Afebrile     Physical Exam  Skin:     Findings: Erythema (of anterior chest maculopapular changes, and on back patchy circular erythematous blanchale area on previous Exelon patch sites wtih scattered maculoppular rashes  ) present  Pertinent Laboratory/Diagnostic Studies:  No lab for this visit     Current Medications   Medication list in facility chart was reviewed and necessary changes made  Assessment and Plan   Skin rash  Due to allergic reaction to Excelon patch  Will change patch to po 1 5mg  tabs BID, with close monitoring  Added Claritin  Sarna for pruritus         Paulo Wood MD  8/0/47196:81 PM

## 2021-03-08 NOTE — ASSESSMENT & PLAN NOTE
Due to allergic reaction to Excelon patch  Will change patch to po 1 5mg  tabs BID, with close monitoring  Added Claritin  Sarna for pruritus

## 2021-03-31 ENCOUNTER — NURSING HOME VISIT (OUTPATIENT)
Dept: GERIATRICS | Facility: OTHER | Age: 86
End: 2021-03-31
Payer: COMMERCIAL

## 2021-03-31 DIAGNOSIS — M54.50 CHRONIC RIGHT-SIDED LOW BACK PAIN WITHOUT SCIATICA: ICD-10-CM

## 2021-03-31 DIAGNOSIS — E03.9 ACQUIRED HYPOTHYROIDISM: Primary | ICD-10-CM

## 2021-03-31 DIAGNOSIS — G89.29 CHRONIC RIGHT-SIDED LOW BACK PAIN WITHOUT SCIATICA: ICD-10-CM

## 2021-03-31 DIAGNOSIS — I25.10 CORONARY ARTERY DISEASE INVOLVING NATIVE CORONARY ARTERY OF NATIVE HEART WITHOUT ANGINA PECTORIS: ICD-10-CM

## 2021-03-31 DIAGNOSIS — I50.9 CHRONIC CONGESTIVE HEART FAILURE, UNSPECIFIED HEART FAILURE TYPE (HCC): ICD-10-CM

## 2021-03-31 DIAGNOSIS — F03.90: ICD-10-CM

## 2021-03-31 DIAGNOSIS — I48.0 PAROXYSMAL ATRIAL FIBRILLATION (HCC): ICD-10-CM

## 2021-03-31 DIAGNOSIS — N18.31 STAGE 3A CHRONIC KIDNEY DISEASE (HCC): ICD-10-CM

## 2021-03-31 PROCEDURE — 99309 SBSQ NF CARE MODERATE MDM 30: CPT | Performed by: FAMILY MEDICINE

## 2021-03-31 NOTE — PROGRESS NOTES
[facility]  [address]  [phone]  Facility:[place] [address]          NAME: Matthew Ortega  AGE: 82 y o  SEX: female    DATE OF ENCOUNTER: 3/31/2021    Chief Complaint     Chest wall pain for 2 months! History of Present Illness     HPI    The following portions of the patient's history were reviewed and updated as appropriate (from facility chart and hospital records): allergies, current medications, past family history, past medical history, past social history, past surgical history and problem list   Pt was seen and examined for neurocognitive disorder, Hypothyroidism, Afib, chronic back pain  Pt continues with NH care, walks using her walker  Pt has PT/INR today  No other specific issues or concerns  Last TSH in December, and stable  Stable renal function  Today pt reported that she has chest pain for two months in upper mid chest which comes and goes and when I asked her if she has told anyone about it she said "no" because it goes away, I also mentioned about NG tab options and she said it gives her severe headaches!   No SOB, no MEYERS  Still seeing little kids but they are not bothering her  Pt states that her back pain is much better after she has had some therapy  Review of Systems     Review of Systems   Constitutional: Negative  HENT: Negative  Eyes: Negative  Respiratory: Negative  Cardiovascular: Negative  Gastrointestinal: Negative  Negative for vomiting  Endocrine: Negative  Genitourinary: Negative  Musculoskeletal: Negative  Skin: Negative  Negative for color change and rash  Allergic/Immunologic: Negative  Neurological: Negative  Hematological: Negative  Psychiatric/Behavioral: Negative  All other systems reviewed and are negative        Active Problem List     Patient Active Problem List   Diagnosis    Diabetes mellitus type 2, noninsulin dependent ([address] )    Acquired hypothyroidism    Coronary artery disease involving native coronary artery of native heart without angina pectoris    Paroxysmal atrial fibrillation (HCC)    Ischemic cardiomyopathy    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[address] )    Major neurocognitive disorder, rule out dementia with Lewy bodies    CKD (chronic kidney disease) stage 3, GFR 30-59 ml/min    Acute encephalopathy    Hypokalemia    Medical clearance for psychiatric admission    GERD (gastroesophageal reflux disease)    History of CHF (congestive heart failure)    Actinic keratoses    Facial basal cell cancer    Anaphylactic reaction    Hereditary and idiopathic peripheral neuropathy    Hyperlipidemia    Hypertension    Orthostatic hypotension    Osteoarthritis of knee    Sensorineural hearing loss (SNHL) of both ears    Sleep apnea    Syncope    Vestibular disequilibrium involving both inner ears    Chronic congestive heart failure (HCC)    Chronic right-sided low back pain without sciatica    Visual hallucinations    Acute cystitis    Irritation of left eye    Skin rash       Objective     Vitals: wt:186  9Ibs             BP:113/52   Afebrile      Physical Exam  Vitals signs and nursing note reviewed  Constitutional:       General: She is not in acute distress  Appearance: Normal appearance  She is well-developed  She is not ill-appearing, toxic-appearing or diaphoretic  HENT:      Head: Normocephalic and atraumatic  Right Ear: External ear normal       Left Ear: External ear normal       Nose: Nose normal  No congestion or rhinorrhea  Mouth/Throat:      Mouth: Mucous membranes are moist       Comments: Dentures   Eyes:      General: No scleral icterus  Right eye: No discharge  Left eye: No discharge  Conjunctiva/sclera: Conjunctivae normal       Pupils: Pupils are equal, round, and reactive to light  Neck:      Musculoskeletal: Normal range of motion and neck supple  No neck rigidity or muscular tenderness     Cardiovascular:      Rate and Rhythm: Normal rate  Rhythm irregular  Heart sounds: Normal heart sounds  No murmur  No friction rub  No gallop  Comments: Irregular, irregular  Pulmonary:      Effort: Pulmonary effort is normal  No respiratory distress  Breath sounds: Normal breath sounds  No stridor  No wheezing, rhonchi or rales  Chest:      Chest wall: No tenderness  Abdominal:      General: Abdomen is flat  Bowel sounds are normal  There is no distension  Palpations: Abdomen is soft  There is no mass  Tenderness: There is no abdominal tenderness  There is no guarding or rebound  Hernia: No hernia is present  Genitourinary:     Comments: Deferred  Musculoskeletal: Normal range of motion  General: Deformity (mild kyphosis  ) present  No swelling, tenderness or signs of injury  Right lower leg: Edema present  Left lower leg: Edema present  Comments: Moves all extremities  Reproducible mid upper toward left chest wall pain  Lymphadenopathy:      Cervical: No cervical adenopathy  Skin:     General: Skin is warm and dry  Coloration: Skin is not jaundiced or pale  Findings: Erythema present  No bruising, lesion or rash  Comments: Didn't examine sacral area  Neurological:      Mental Status: She is alert and oriented to person, place, and time  Cranial Nerves: Cranial nerve deficit ([place]) present  Comments: Forgetful  Psychiatric:         Behavior: Behavior normal          Pertinent Laboratory/Diagnostic Studies:  PT/INR today     Current Medications   Medication list in facility chart was reviewed and necessary changes made  Assessment and Plan   Acquired hypothyroidism  On Levothyroxine, last TSH done in December, stable  Will continue with monitoring  Paroxysmal atrial fibrillation (HCC)  PT/INR today, on Coumadin, HR controlled with Metoprolol  Major neurocognitive disorder, rule out dementia with Lewy bodies  No behaviors   On Rivastigmine, stable  Wt stable  Continues with NH care  CKD (chronic kidney disease) stage 3, GFR 30-59 ml/min ([facility])  Last lab done in Feb and stable  Chronic right-sided low back pain without sciatica  Stable with current med management  Will continue with monitoring  Chronic congestive heart failure (HCC)  Asymptomatic with Lasix, will continue with monitoring  Coronary artery disease involving native coronary artery of native heart without angina pectoris  Pt was advised if she has any more chest wall pain to inform nursing staff so they can evaluate if she would need NG  Pt's description and presentation with chest pain (for two months and being reproducible) is more musculoskeletal  Vitals q shift       Jose Armando Coates MD  0/78/12972:20 PM

## 2021-04-01 NOTE — ASSESSMENT & PLAN NOTE
Pt was advised if she has any more chest wall pain to inform nursing staff so they can evaluate if she would need NG  Pt's description and presentation with chest pain (for two months and being reproducible) is more musculoskeletal  Vitals q shift

## 2021-04-15 ENCOUNTER — NURSING HOME VISIT (OUTPATIENT)
Dept: GERIATRICS | Facility: OTHER | Age: 86
End: 2021-04-15
Payer: COMMERCIAL

## 2021-04-15 DIAGNOSIS — F03.90: ICD-10-CM

## 2021-04-15 DIAGNOSIS — R44.1 VISUAL HALLUCINATIONS: ICD-10-CM

## 2021-04-15 DIAGNOSIS — F29 ATYPICAL PSYCHOSIS (HCC): ICD-10-CM

## 2021-04-15 DIAGNOSIS — F32.3 CURRENT SEVERE EPISODE OF MAJOR DEPRESSIVE DISORDER WITH PSYCHOTIC FEATURES WITHOUT PRIOR EPISODE (HCC): Primary | ICD-10-CM

## 2021-04-15 PROCEDURE — 99309 SBSQ NF CARE MODERATE MDM 30: CPT | Performed by: NURSE PRACTITIONER

## 2021-04-16 PROBLEM — F32.3 CURRENT SEVERE EPISODE OF MAJOR DEPRESSIVE DISORDER WITH PSYCHOTIC FEATURES WITHOUT PRIOR EPISODE (HCC): Status: ACTIVE | Noted: 2021-04-16

## 2021-04-16 NOTE — PROGRESS NOTES
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 ([facility] [address] )  F32 3    2  Major neurocognitive disorder, rule out dementia with Lewy bodies  F01 50    3  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[facility] [address] )  F29    4  Visual hallucinations  R44 1        Add Seroquel 25 mg HS    Treatment Recommendations/Precautions:      Medication management every 2 weeks    Medications Risks/Benefits      Risks, Benefits And Possible Side Effects Of Medications:    Risks, benefits, and possible side effects of medications explained to Alexis Zaman and she verbalizes understanding and agreement for treatment  Controlled Medication Discussion:     Not applicable - controlled prescriptions are not prescribed by this practice    Evaluation of Psychotropic Drugs for possible gradual dose reductions    Psychotropic medications have been reviewed  Patient continues with symptoms of depression and hallucinations as noted below  Any dose reductions at this time would be clinically contraindicated, as it would be likely to cause worsening of symptoms  Psychotherapy Provided:     Individual psychotherapy provided: Medications, treatment progress and treatment plan reviewed with Alexis Zaman  Reassurance and supportive therapy provided  Chief Complaint     Follow up for depression and hallucinations    History of Present Illness     Alexis Zaman is seen in follow up for depression and ongoing hallucinations  Staff report and patient confirms, a worsening of visual hallucinations - and they are more distressing now  She is seen in her room  She is pleasant and fair historian, mild forgetfulness    She is aware of her dementia diagnosis and has some insight  She describes frightening hallucinations  She also continues to struggle with being brought to [facility] right from hospital and losing her home and most belongings without being able to participate in that process  Admits some evenings she cries herself to sleep  We discussed medications options and she expressed concerns of her multiple medications allergies  She is agreeable to starting low dose of Seroquel at bedtime and will follow up in 2 weeks  Depression  This is a chronic problem  The current episode started more than 1 month ago  The problem occurs daily  The problem has been gradually worsening  The treatment provided mild relief  She reports fluctuating sleep pattern, fluctuating appetite, fluctuating energy levels    The following portions of the patient's history were reviewed and updated as appropriate: allergies, current medications, past family history, past medical history, past social history, past surgical history and problem list     Review of Systems     Review of Systems   Psychiatric/Behavioral: Positive for decreased concentration, depression and dysphoric mood  All other systems reviewed and are negative        Active Problem List     Patient Active Problem List   Diagnosis    Diabetes mellitus type 2, noninsulin dependent ([facility] [address] )    Acquired hypothyroidism    Coronary artery disease involving native coronary artery of native heart without angina pectoris    Paroxysmal atrial fibrillation (HCC)    Ischemic cardiomyopathy    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[facility] [address] )    Major neurocognitive disorder, rule out dementia with Lewy bodies    CKD (chronic kidney disease) stage 3, GFR 30-59 ml/min    Acute encephalopathy    Hypokalemia    Medical clearance for psychiatric admission    GERD (gastroesophageal reflux disease)    History of CHF (congestive heart failure)    Actinic keratoses    Facial basal cell cancer    Anaphylactic reaction  Hereditary and idiopathic peripheral neuropathy    Hyperlipidemia    Hypertension    Orthostatic hypotension    Osteoarthritis of knee    Sensorineural hearing loss (SNHL) of both ears    Sleep apnea    Syncope    Vestibular disequilibrium involving both inner ears    Chronic congestive heart failure (HCC)    Chronic right-sided low back pain without sciatica    Visual hallucinations    Acute cystitis    Irritation of left eye    Skin rash       Objective       Physical Exam  Vitals signs and nursing note reviewed  Psychiatric:         Attention and Perception: She perceives visual hallucinations  Mood and Affect: Mood is depressed  Affect is flat  Behavior: Behavior is slowed and withdrawn  Cognition and Memory: Cognition is impaired  Pertinent Laboratory/Diagnostic Studies:  I have personally reviewed pertinent lab results        Mental Status Evaluation:    Appearance age appropriate, casually dressed   Behavior cooperative, calm   Speech normal rate, normal volume, normal pitch   Mood depressed   Affect flat   Thought Processes organized, goal directed   Associations intact associations   Thought Content no overt delusions   Perceptual Disturbances: visual hallucinations   Abnormal Thoughts  Risk Potential Suicidal ideation - None  Homicidal ideation - None  Potential for aggression - No   Orientation oriented to person, place and time/date   Memory recent memory mildly impaired   Consciousness alert and awake   Attention Span Concentration Span decreased attention span  decreased concentration   Intellect appears to be of average intelligence   Insight limited   Judgement limited   Muscle Strength and  Gait uses wheelchair   Motor activity no abnormal movements   Language no difficulty naming common objects, no difficulty repeating a phrase, no difficulty writing a sentence   Fund of Knowledge adequate knowledge of current events  adequate fund of knowledge regarding past history  adequate fund of knowledge regarding vocabulary        Current Medications       Current Outpatient Medications:     aspirin (ECOTRIN LOW STRENGTH) 81 mg EC tablet, Take 1 tablet (81 mg total) by mouth daily, Disp: 30 tablet, Rfl: 0    atorvastatin (LIPITOR) 10 mg tablet, Take 10 mg by mouth daily, Disp: , Rfl:     famotidine (PEPCID) 10 mg tablet, Take 1 tablet (10 mg total) by mouth daily, Disp: 30 tablet, Rfl: 0    folic acid (FOLVITE) 1 mg tablet, Take 1 tablet (1 mg total) by mouth daily, Disp: 30 tablet, Rfl: 0    furosemide (LASIX) 20 mg tablet, Take 1 tablet (20 mg total) by mouth daily, Disp: 30 tablet, Rfl: 0    levothyroxine 100 mcg tablet, Take 1 tablet (100 mcg total) by mouth daily in the early morning, Disp: 30 tablet, Rfl: 0    loratadine (CLARITIN) 10 mg tablet, Take 10 mg by mouth daily, Disp: , Rfl:     melatonin 3 mg, Take 2 tablets (6 mg total) by mouth daily at bedtime, Disp: 60 tablet, Rfl: 0    metoprolol tartrate (LOPRESSOR) 25 mg tablet, Take 1 tablet (25 mg total) by mouth every 12 (twelve) hours, Disp: 30 tablet, Rfl: 0    nitroglycerin (NITROSTAT) 0 4 mg SL tablet, Place 1 tablet (0 4 mg total) under the tongue every 5 (five) minutes as needed for chest pain, Disp: 30 tablet, Rfl: 0    potassium chloride (K-DUR,KLOR-CON) 20 mEq tablet, Take 1 tablet (20 mEq total) by mouth daily, Disp: 30 tablet, Rfl: 0    rivastigmine (EXELON) 1 5 mg capsule, Take 1 5 mg by mouth 2 (two) times a day, Disp: , Rfl:     sertraline (ZOLOFT) 50 mg tablet, Take 1 tablet (50 mg total) by mouth daily, Disp: 30 tablet, Rfl: 0    vitamin B-12 (VITAMIN B-12) 1,000 mcg tablet, Take 1 tablet (1,000 mcg total) by mouth daily, Disp: 30 tablet, Rfl: 0    warfarin (COUMADIN) 5 mg tablet, PT= 26 3 and INR 2 4, Disp: 30 tablet, Rfl: 0      Counseling / Coordination of Care  Total floor / unit time spent today 25 minutes   Greater than 50% of total time was spent with the patient and / or family counseling and / or coordination of care       KEIKO Rivera 04/15/21

## 2021-05-13 ENCOUNTER — NURSING HOME VISIT (OUTPATIENT)
Dept: GERIATRICS | Facility: OTHER | Age: 86
End: 2021-05-13
Payer: COMMERCIAL

## 2021-05-13 DIAGNOSIS — F32.3 CURRENT SEVERE EPISODE OF MAJOR DEPRESSIVE DISORDER WITH PSYCHOTIC FEATURES WITHOUT PRIOR EPISODE (HCC): Primary | ICD-10-CM

## 2021-05-13 DIAGNOSIS — R44.1 VISUAL HALLUCINATIONS: ICD-10-CM

## 2021-05-13 DIAGNOSIS — F29 ATYPICAL PSYCHOSIS (HCC): ICD-10-CM

## 2021-05-13 DIAGNOSIS — F03.90: ICD-10-CM

## 2021-05-13 PROCEDURE — 99309 SBSQ NF CARE MODERATE MDM 30: CPT | Performed by: NURSE PRACTITIONER

## 2021-05-15 NOTE — PROGRESS NOTES
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 ([facility] [address] )  F32 3    2  Major neurocognitive disorder, rule out dementia with Lewy bodies  F01 50    3  Visual hallucinations  R44 1    4  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HCC)  F29      Increase Seroquel 37 5 mg     Treatment Recommendations/Precautions:      Medication management every 2 weeks    Medications Risks/Benefits      Risks, Benefits And Possible Side Effects Of Medications:    Risks, benefits, and possible side effects of medications explained to Rosie Reddy and she verbalizes understanding and agreement for treatment  Controlled Medication Discussion:     Not applicable - controlled prescriptions are not prescribed by this practice    Evaluation of Psychotropic Drugs for possible gradual dose reductions    Psychotropic medications have been reviewed  Patient continues with symptoms of hallucinations as noted below  Any dose reductions at this time would be clinically contraindicated, as it would be likely to cause worsening of symptoms  Psychotherapy Provided:     Individual psychotherapy provided: Medications, treatment progress and treatment plan reviewed with Rosie Reddy  Reassurance and supportive therapy provided  Chief Complaint     Follow up for hallucinations    History of Present Illness     Rosie Reddy is seen in follow up for hallucinations  She is seen in her room and presents pleasant and cooperative  She actually tells me hallucinations have all but stopped, and feels medications have helped - was started on Seroquel 25 mg at last visit    She does tell me she believes two men come into her room and sit on her walker  She states "I don't see them, but I sense they are there  Staff report she has continued to complain of hallucinations intermittently, she does not seem to remember  Depression  This is a chronic problem  The current episode started more than 1 month ago  The problem occurs daily  The problem has been gradually improving  The treatment provided mild relief  She reports fluctuating sleep pattern, fluctuating appetite, fluctuating energy levels    The following portions of the patient's history were reviewed and updated as appropriate: allergies, current medications, past family history, past medical history, past social history, past surgical history and problem list     Review of Systems     Review of Systems   Psychiatric/Behavioral: Positive for decreased concentration, depression and dysphoric mood  All other systems reviewed and are negative        Active Problem List     Patient Active Problem List   Diagnosis    Diabetes mellitus type 2, noninsulin dependent ([facility] [address] )    Acquired hypothyroidism    Coronary artery disease involving native coronary artery of native heart without angina pectoris    Paroxysmal [facility] fibrillation ([facility])    Ischemic cardiomyopathy    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[facility] [address] )    Major neurocognitive disorder, rule out dementia with Lewy bodies    CKD (chronic kidney disease) stage 3, GFR 30-59 ml/min ([facility])    Acute encephalopathy    Hypokalemia    Medical clearance for psychiatric admission    GERD (gastroesophageal reflux disease)    History of CHF (congestive heart failure)    Actinic keratoses    Facial basal cell cancer    Anaphylactic reaction    Hereditary and idiopathic peripheral neuropathy    Hyperlipidemia    Hypertension    Orthostatic hypotension    Osteoarthritis of knee    Sensorineural hearing loss (SNHL) of both ears    Sleep apnea    Syncope    Vestibular disequilibrium involving both inner ears    Chronic congestive heart failure (HCC)    Chronic right-sided low back pain without sciatica    Visual hallucinations    Acute cystitis    Irritation of left eye    Skin rash    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 ([facility] [address] )       Objective       Physical Exam  Vitals signs and nursing note reviewed  Psychiatric:         Attention and Perception: She perceives visual hallucinations  Mood and Affect: Mood is depressed  Affect is flat  Cognition and Memory: Cognition is impaired  Pertinent Laboratory/Diagnostic Studies:  I have personally reviewed pertinent lab results        Mental Status Evaluation:    Appearance age appropriate   Behavior cooperative, calm   Speech normal rate, normal volume, normal pitch   Mood dysphoric   Affect flat   Thought Processes circumstantial   Associations intact associations   Thought Content no overt delusions   Perceptual Disturbances: auditory hallucinations, visual hallucinations   Abnormal Thoughts  Risk Potential Suicidal ideation - None  Homicidal ideation - None  Potential for aggression - No   Orientation oriented to person and place   Memory recent memory mildly impaired   Consciousness alert and awake   Attention Span Concentration Span decreased attention span  decreased concentration   Intellect appears to be of average intelligence   Insight limited   Judgement limited   Muscle Strength and  Gait uses walker   Motor activity no abnormal movements   Language difficulty naming common objects, difficulty repeating a phrase, difficulty writing a sentence   Fund of Knowledge impaired knowledge of current events  impaired fund of knowledge regarding past history  impaired fund of knowledge regarding vocabulary       Current Medications       Current Outpatient Medications:     aspirin (ECOTRIN LOW STRENGTH) 81 mg EC tablet, Take 1 tablet (81 mg total) by mouth daily, Disp: 30 tablet, Rfl: 0    atorvastatin (LIPITOR) 10 mg tablet, Take 10 mg by mouth daily, Disp: , Rfl:     famotidine (PEPCID) 10 mg tablet, Take 1 tablet (10 mg total) by mouth daily, Disp: 30 tablet, Rfl: 0    folic acid (FOLVITE) 1 mg tablet, Take 1 tablet (1 mg total) by mouth daily, Disp: 30 tablet, Rfl: 0    furosemide (LASIX) 20 mg tablet, Take 1 tablet (20 mg total) by mouth daily, Disp: 30 tablet, Rfl: 0    levothyroxine 100 mcg tablet, Take 1 tablet (100 mcg total) by mouth daily in the early morning, Disp: 30 tablet, Rfl: 0    loratadine (CLARITIN) 10 mg tablet, Take 10 mg by mouth daily, Disp: , Rfl:     melatonin 3 mg, Take 2 tablets (6 mg total) by mouth daily at bedtime, Disp: 60 tablet, Rfl: 0    metoprolol tartrate (LOPRESSOR) 25 mg tablet, Take 1 tablet (25 mg total) by mouth every 12 (twelve) hours, Disp: 30 tablet, Rfl: 0    nitroglycerin (NITROSTAT) 0 4 mg SL tablet, Place 1 tablet (0 4 mg total) under the tongue every 5 (five) minutes as needed for chest pain, Disp: 30 tablet, Rfl: 0    potassium chloride (K-DUR,KLOR-CON) 20 mEq tablet, Take 1 tablet (20 mEq total) by mouth daily, Disp: 30 tablet, Rfl: 0    rivastigmine (EXELON) 1 5 mg capsule, Take 1 5 mg by mouth 2 (two) times a day, Disp: , Rfl:     sertraline (ZOLOFT) 50 mg tablet, Take 1 tablet (50 mg total) by mouth daily, Disp: 30 tablet, Rfl: 0    vitamin B-12 (VITAMIN B-12) 1,000 mcg tablet, Take 1 tablet (1,000 mcg total) by mouth daily, Disp: 30 tablet, Rfl: 0    warfarin (COUMADIN) 5 mg tablet, PT= 26 3 and INR 2 4, Disp: 30 tablet, Rfl: 0      Counseling / Coordination of Care  Total floor / unit time spent today 25 minutes  Greater than 50% of total time was spent with the patient and / or family counseling and / or coordination of care       KEIKO Oliver 5/13/2021

## 2021-05-26 ENCOUNTER — NURSING HOME VISIT (OUTPATIENT)
Dept: GERIATRICS | Facility: OTHER | Age: 86
End: 2021-05-26
Payer: COMMERCIAL

## 2021-05-26 DIAGNOSIS — G89.29 CHRONIC RIGHT-SIDED LOW BACK PAIN WITHOUT SCIATICA: ICD-10-CM

## 2021-05-26 DIAGNOSIS — R44.1 VISUAL HALLUCINATIONS: ICD-10-CM

## 2021-05-26 DIAGNOSIS — F03.90: Primary | ICD-10-CM

## 2021-05-26 DIAGNOSIS — I10 ESSENTIAL HYPERTENSION: ICD-10-CM

## 2021-05-26 DIAGNOSIS — I48.0 PAROXYSMAL ATRIAL FIBRILLATION (HCC): ICD-10-CM

## 2021-05-26 DIAGNOSIS — M54.50 CHRONIC RIGHT-SIDED LOW BACK PAIN WITHOUT SCIATICA: ICD-10-CM

## 2021-05-26 DIAGNOSIS — N18.31 STAGE 3A CHRONIC KIDNEY DISEASE (HCC): ICD-10-CM

## 2021-05-26 PROBLEM — N30.00 ACUTE CYSTITIS: Status: RESOLVED | Noted: 2021-02-03 | Resolved: 2021-05-26

## 2021-05-26 PROCEDURE — 99309 SBSQ NF CARE MODERATE MDM 30: CPT | Performed by: FAMILY MEDICINE

## 2021-05-26 RX ORDER — QUETIAPINE FUMARATE 25 MG/1
37.5 TABLET, FILM COATED ORAL
COMMUNITY

## 2021-05-26 NOTE — PROGRESS NOTES
[facility]  [address]  [phone]  Facility: [place] [address]          NAME: Janae Ruiz  AGE: 82 y o  SEX: female    DATE OF ENCOUNTER: 5/26/2021    Chief Complaint     Pt has no specific complaint     History of Present Illness     HPI    The following portions of the patient's history were reviewed and updated as appropriate (from facility chart and hospital records): allergies, current medications, past family history, past medical history, past social history, past surgical history and problem list   Pt was seen and examined for f/u on Hypothyroidism, Dementia, Afib, CKD, CHF and chronic back pain  Pt has been stable  HR controlled   Pt had PT/INR today and it is slightly at supra therapeutic level  BMP in Feb stable  No behaviors  Pt denies having any hallucinations any more  No CHF exacerbation  Review of Systems     Review of Systems   Constitutional: Negative  HENT: Negative  Eyes: Negative  Respiratory: Negative  Cardiovascular: Negative  Gastrointestinal: Negative  Endocrine: Negative  Genitourinary: Negative  Musculoskeletal: Negative  Chronic back pain but stable  Skin: Negative  Allergic/Immunologic: Negative  Neurological: Negative  Hematological: Negative  Psychiatric/Behavioral: Negative  No hallucinations    All other systems reviewed and are negative        Active Problem List     Patient Active Problem List   Diagnosis    Diabetes mellitus type 2, noninsulin dependent ([address] )    Acquired hypothyroidism    Coronary artery disease involving native coronary artery of native heart without angina pectoris    Paroxysmal atrial fibrillation (HCC)    Ischemic cardiomyopathy    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[address] )    Major neurocognitive disorder, rule out dementia with Lewy bodies    CKD (chronic kidney disease) stage 3, GFR 30-59 ml/min (HCC)    Acute encephalopathy    Hypokalemia    Medical clearance for psychiatric admission    GERD (gastroesophageal reflux disease)    History of CHF (congestive heart failure)    Actinic keratoses    Facial basal cell cancer    Anaphylactic reaction    Hereditary and idiopathic peripheral neuropathy    Hyperlipidemia    Hypertension    Orthostatic hypotension    Osteoarthritis of knee    Sensorineural hearing loss (SNHL) of both ears    Sleep apnea    Syncope    Vestibular disequilibrium involving both inner ears    Chronic congestive heart failure (HCC)    Chronic right-sided low back pain without sciatica    Visual hallucinations    Irritation of left eye    Skin rash    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 (HCC)       Objective     Vitals: wt:186IBs           BP:146/76    Afebrile      HR:74    Physical Exam  Vitals signs and nursing note reviewed  Constitutional:       General: She is not in acute distress  Appearance: She is well-developed  She is not diaphoretic  HENT:      Head: Normocephalic and atraumatic  Right Ear: External ear normal       Left Ear: External ear normal       Ears:      Comments: [place]     Nose: Nose normal  No congestion or rhinorrhea  Mouth/Throat:      Comments: Dentures   Eyes:      General: No scleral icterus  Right eye: No discharge  Left eye: No discharge  Conjunctiva/sclera: Conjunctivae normal       Pupils: Pupils are equal, round, and reactive to light  Neck:      Musculoskeletal: Normal range of motion and neck supple  No neck rigidity or muscular tenderness  Cardiovascular:      Rate and Rhythm: Normal rate  Rhythm irregular  Heart sounds: Normal heart sounds  No murmur  No friction rub  No gallop  Pulmonary:      Effort: Pulmonary effort is normal  No respiratory distress  Breath sounds: Normal breath sounds  No stridor  No wheezing, rhonchi or rales  Chest:      Chest wall: No tenderness     Abdominal: General: Abdomen is flat  Bowel sounds are normal  There is no distension  Palpations: Abdomen is soft  There is no mass  Tenderness: There is no abdominal tenderness  There is no guarding or rebound  Hernia: No hernia is present  Comments: protuberant   Musculoskeletal:         General: No swelling, tenderness, deformity or signs of injury  Right lower leg: Edema present  Left lower leg: Edema present  Comments: Walked few steps in room, slow pacing  Lymphadenopathy:      Cervical: No cervical adenopathy  Skin:     General: Skin is warm and dry  Coloration: Skin is not jaundiced or pale  Findings: Erythema present  No bruising, lesion or rash  Neurological:      Mental Status: She is alert and oriented to person, place, and time  Cranial Nerves: Cranial nerve deficit ([place]) present  Comments: Forgetful  Follows command  s          Pertinent Laboratory/Diagnostic Studies:  Pt/INR today  Feb: BMP, Jan:CBC    Current Medications   Medication list in facility chart was reviewed and necessary changes made  Assessment and Plan   Major neurocognitive disorder, rule out dementia with Lewy bodies  No behaviors  Continues with NH care  Wt stable  On Rivastigmine  Hypertension  BP stable with Lasix, no exacerbation  Paroxysmal atrial fibrillation (HCC)  HR controlled with Metoprolol  INR elevated today  Will hold Coumadinda for 2 days, repeat INR in 2 days    CKD (chronic kidney disease) stage 3, GFR 30-59 ml/min (HCC)  Last lab in February stable, will continue with close monitoring  Chronic right-sided low back pain without sciatica  Pt on Aspercreme patch and Tylenol  Stable  Visual hallucinations  Stable with Seroquel  Will continue with monitoring         Abundio Pennington MD  2/92/85906:12 PM

## 2021-05-26 NOTE — ASSESSMENT & PLAN NOTE
HR controlled with Metoprolol  INR elevated today   Will hold Coumadinda for 2 days, repeat INR in 2 days

## 2021-06-03 ENCOUNTER — NURSING HOME VISIT (OUTPATIENT)
Dept: GERIATRICS | Facility: OTHER | Age: 86
End: 2021-06-03
Payer: COMMERCIAL

## 2021-06-03 DIAGNOSIS — R44.1 VISUAL HALLUCINATIONS: ICD-10-CM

## 2021-06-03 DIAGNOSIS — F03.90: ICD-10-CM

## 2021-06-03 DIAGNOSIS — F32.3 CURRENT SEVERE EPISODE OF MAJOR DEPRESSIVE DISORDER WITH PSYCHOTIC FEATURES WITHOUT PRIOR EPISODE (HCC): Primary | ICD-10-CM

## 2021-06-03 DIAGNOSIS — F29 ATYPICAL PSYCHOSIS (HCC): ICD-10-CM

## 2021-06-03 PROCEDURE — 99308 SBSQ NF CARE LOW MDM 20: CPT | Performed by: NURSE PRACTITIONER

## 2021-06-04 NOTE — PROGRESS NOTES
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 ([facility] [address] )  F32 3    2  Major neurocognitive disorder, rule out dementia with Lewy bodies  F01 50    3  Visual hallucinations  R44 1    4  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[facility] )  F29        No orders of the defined types were placed in this encounter  Treatment Recommendations/Precautions:      Medication management every 1 month    Medications Risks/Benefits      Risks, Benefits And Possible Side Effects Of Medications:    Risks, benefits, and possible side effects of medications explained to Pratik and she verbalizes understanding and agreement for treatment  Controlled Medication Discussion:     Not applicable - controlled prescriptions are not prescribed by this practice    Evaluation of Psychotropic Drugs for possible gradual dose reductions    Psychotropic medications have been reviewed  Patient continues with symptoms of depression and hallucinations as noted below  Any dose reductions at this time would be clinically contraindicated, as it would be likely to cause worsening of symptoms  Psychotherapy Provided:     Individual psychotherapy provided: Medications, treatment progress and treatment plan reviewed with Pratik  Reassurance and supportive therapy provided  Chief Complaint     Follow up for depression and hallucinations    History of Present Illness     Potelizabethkarsonnataliya is seen in follow up for hallucinations  She reports no longer having visual hallucinations  Occasional auditory hallucinations, but states she is able to tell herself it's not real and does not cause too much distress    She is in agreement to continue current Seroquel dose and only consider dose increase if symptoms cause increased distress, concerned about side effects  Will follow up in one month  Mood is otherwise improved, with decreased anxiety  Anxiety  Presents for follow-up visit  Symptoms include confusion, decreased concentration, depressed mood, irritability, nervous/anxious behavior and restlessness  Symptoms occur occasionally  The severity of symptoms is moderate  The quality of sleep is fair  Nighttime awakenings: occasional      Compliance with medications is %  Depression  This is a chronic problem  The current episode started more than 1 month ago  The problem occurs intermittently  The problem has been gradually worsening  The treatment provided mild relief  She reports fluctuating sleep pattern, fluctuating appetite, fluctuating energy levels    The following portions of the patient's history were reviewed and updated as appropriate: allergies, current medications, past family history, past medical history, past social history, past surgical history and problem list     Review of Systems     Review of Systems   Constitutional: Positive for irritability  Psychiatric/Behavioral: Positive for confusion, decreased concentration, depression and hallucinations  The patient is nervous/anxious  All other systems reviewed and are negative        Active Problem List     Patient Active Problem List   Diagnosis    Diabetes mellitus type 2, noninsulin dependent ([facility] [address] )    Acquired hypothyroidism    Coronary artery disease involving native coronary artery of native heart without angina pectoris    Paroxysmal atrial fibrillation (HCC)    Ischemic cardiomyopathy    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[facility] [address] )    Major neurocognitive disorder, rule out dementia with Lewy bodies    CKD (chronic kidney disease) stage 3, GFR 30-59 ml/min ([facility])    Acute encephalopathy    Hypokalemia    Medical clearance for psychiatric admission  GERD (gastroesophageal reflux disease)    History of CHF (congestive heart failure)    Actinic keratoses    Facial basal cell cancer    Anaphylactic reaction    Hereditary and idiopathic peripheral neuropathy    Hyperlipidemia    Hypertension    Orthostatic hypotension    Osteoarthritis of knee    Sensorineural hearing loss (SNHL) of both ears    Sleep apnea    Syncope    Vestibular disequilibrium involving both inner ears    Chronic congestive heart failure (HCC)    Chronic right-sided low back pain without sciatica    Visual hallucinations    Irritation of left eye    Skin rash    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 ([facility] [address] )       Objective       Physical Exam  Vitals signs and nursing note reviewed  Psychiatric:         Attention and Perception: She perceives auditory hallucinations  Mood and Affect: Mood is depressed  Affect is flat  Cognition and Memory: Cognition is impaired  Pertinent Laboratory/Diagnostic Studies:  I have personally reviewed pertinent lab results        Mental Status Evaluation:    Appearance age appropriate, casually dressed   Behavior cooperative, calm   Speech normal rate, normal volume   Mood depressed   Affect flat   Thought Processes logical   Associations intact associations   Thought Content no overt delusions   Perceptual Disturbances: visual hallucinations   Abnormal Thoughts  Risk Potential Suicidal ideation - None  Homicidal ideation - None  Potential for aggression - No   Orientation oriented to person   Memory recent and remote memory grossly intact   Consciousness alert and awake   Attention Span Concentration Span decreased attention span  decreased concentration   Intellect appears to be of average intelligence   Insight limited   Judgement limited   Muscle Strength and  Gait uses wheelchair   Motor activity no abnormal movements   Language no difficulty naming common objects, no difficulty repeating a phrase, no difficulty writing a sentence   Fund of Knowledge adequate knowledge of current events  adequate fund of knowledge regarding past history  adequate fund of knowledge regarding vocabulary        Current Medications       Current Outpatient Medications:     aspirin (ECOTRIN LOW STRENGTH) 81 mg EC tablet, Take 1 tablet (81 mg total) by mouth daily, Disp: 30 tablet, Rfl: 0    atorvastatin (LIPITOR) 10 mg tablet, Take 10 mg by mouth daily, Disp: , Rfl:     famotidine (PEPCID) 10 mg tablet, Take 1 tablet (10 mg total) by mouth daily, Disp: 30 tablet, Rfl: 0    folic acid (FOLVITE) 1 mg tablet, Take 1 tablet (1 mg total) by mouth daily, Disp: 30 tablet, Rfl: 0    furosemide (LASIX) 20 mg tablet, Take 1 tablet (20 mg total) by mouth daily, Disp: 30 tablet, Rfl: 0    levothyroxine 100 mcg tablet, Take 1 tablet (100 mcg total) by mouth daily in the early morning, Disp: 30 tablet, Rfl: 0    loratadine (CLARITIN) 10 mg tablet, Take 10 mg by mouth daily, Disp: , Rfl:     melatonin 3 mg, Take 2 tablets (6 mg total) by mouth daily at bedtime, Disp: 60 tablet, Rfl: 0    metoprolol tartrate (LOPRESSOR) 25 mg tablet, Take 1 tablet (25 mg total) by mouth every 12 (twelve) hours, Disp: 30 tablet, Rfl: 0    nitroglycerin (NITROSTAT) 0 4 mg SL tablet, Place 1 tablet (0 4 mg total) under the tongue every 5 (five) minutes as needed for chest pain, Disp: 30 tablet, Rfl: 0    potassium chloride (K-DUR,KLOR-CON) 20 mEq tablet, Take 1 tablet (20 mEq total) by mouth daily, Disp: 30 tablet, Rfl: 0    QUEtiapine (SEROquel) 25 mg tablet, Take 37 5 mg by mouth daily at bedtime, Disp: , Rfl:     rivastigmine (EXELON) 1 5 mg capsule, Take 1 5 mg by mouth 2 (two) times a day, Disp: , Rfl:     sertraline (ZOLOFT) 50 mg tablet, Take 1 tablet (50 mg total) by mouth daily, Disp: 30 tablet, Rfl: 0    vitamin B-12 (VITAMIN B-12) 1,000 mcg tablet, Take 1 tablet (1,000 mcg total) by mouth daily, Disp: 30 tablet, Rfl: 0    warfarin (COUMADIN) 5 mg tablet, PT= 26 3 and INR 2 4, Disp: 30 tablet, Rfl: 0      Counseling / Coordination of Care  Total floor / unit time spent today 15 minutes  Greater than 50% of total time was spent with the patient and / or family counseling and / or coordination of care       KEIKO Costello 06/03/21

## 2021-06-14 ENCOUNTER — NURSING HOME VISIT (OUTPATIENT)
Dept: GERIATRICS | Facility: OTHER | Age: 86
End: 2021-06-14
Payer: COMMERCIAL

## 2021-06-14 DIAGNOSIS — M79.671 RIGHT FOOT PAIN: Primary | ICD-10-CM

## 2021-06-14 PROBLEM — H57.89 IRRITATION OF LEFT EYE: Status: RESOLVED | Noted: 2021-02-08 | Resolved: 2021-06-14

## 2021-06-14 PROCEDURE — 99308 SBSQ NF CARE LOW MDM 20: CPT | Performed by: FAMILY MEDICINE

## 2021-06-14 NOTE — PROGRESS NOTES
[facility]  [address]  [phone]  Facility: [place] [address]          NAME: Celeste Jameson  AGE: 82 y o  SEX: female    DATE OF ENCOUNTER: 6/14/2021    Chief Complaint     "my foot hurt! History of Present Illness     HPI    The following portions of the patient's history were reviewed and updated as appropriate (from facility chart and hospital records): allergies, current medications, past family history, past medical history, past social history, past surgical history and problem list  Pt was seen and examined per staff report of pt's complaint of foot pain  Pt is poor historian, states that her foot hurts when she walks on it, but she was not able to tell me if it gets better as the day goes by  No report of any trauma  Pt is on scheduled and prn Tylenol       Review of Systems     Review of Systems   Musculoskeletal:        "my foot hurts!"       Active Problem List     Patient Active Problem List   Diagnosis    Diabetes mellitus type 2, noninsulin dependent ([facility] [address] )    Acquired hypothyroidism    Coronary artery disease involving native coronary artery of native heart without angina pectoris    Paroxysmal atrial fibrillation ([facility] [address] )    Ischemic cardiomyopathy    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[facility] [address] )    Major neurocognitive disorder, rule out dementia with Lewy bodies    CKD (chronic kidney disease) stage 3, GFR 30-59 ml/min ([facility])    Acute encephalopathy    Hypokalemia    Medical clearance for psychiatric admission    GERD (gastroesophageal reflux disease)    History of CHF (congestive heart failure)    Actinic keratoses    Facial basal cell cancer    Anaphylactic reaction    Hereditary and idiopathic peripheral neuropathy    Hyperlipidemia    Hypertension    Orthostatic hypotension    Osteoarthritis of knee    Sensorineural hearing loss (SNHL) of both ears    Sleep apnea    Syncope    Vestibular disequilibrium involving both inner ears    Chronic congestive heart failure (HCC)    Chronic right-sided low back pain without sciatica    Visual hallucinations    Skin rash    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 ([address] )    Right foot pain       Objective     Vitals:wt:187Ibs             BP:107/55     Physical Exam  Musculoskeletal:         General: Tenderness ( mid medial side of right foot ) present  Right lower leg: Edema present  Left lower leg: Edema present  Skin:     General: Skin is warm and dry  Coloration: Skin is not jaundiced or pale  Findings: No bruising, erythema, lesion or rash  Comments: Skin of right foot intact          Pertinent Laboratory/Diagnostic Studies:  No lab for this visit     Current Medications   Medication list in facility chart was reviewed and necessary changes made  Assessment and Plan     Right foot pain  On prn and scheduled tylenol  More likely plantar fasciitis  Will have PT eval and treatment  Close monitoring           Gilbert Caldwell MD  0/47/26026:01 PM

## 2021-06-14 NOTE — ASSESSMENT & PLAN NOTE
On prn and scheduled tylenol  More likely plantar fasciitis  Will have PT eval and treatment  Close monitoring

## 2021-07-19 ENCOUNTER — NURSING HOME VISIT (OUTPATIENT)
Dept: GERIATRICS | Facility: OTHER | Age: 86
End: 2021-07-19
Payer: COMMERCIAL

## 2021-07-19 DIAGNOSIS — R44.1 VISUAL HALLUCINATIONS: ICD-10-CM

## 2021-07-19 DIAGNOSIS — F32.3 CURRENT SEVERE EPISODE OF MAJOR DEPRESSIVE DISORDER WITH PSYCHOTIC FEATURES WITHOUT PRIOR EPISODE (HCC): Primary | ICD-10-CM

## 2021-07-19 DIAGNOSIS — F03.90: ICD-10-CM

## 2021-07-19 PROCEDURE — 99309 SBSQ NF CARE MODERATE MDM 30: CPT | Performed by: NURSE PRACTITIONER

## 2021-07-22 NOTE — PROGRESS NOTES
MEDICATION MANAGEMENT NOTE        [facility]  POS: 28: [facility]      Name and Date of Birth:  Martha Bautista 82 y o  1935 MRN: [id number]    Date of Visit: July 19, 2021    Allergies   Allergen Reactions    Shellfish-Derived Products - Food Allergy Anaphylaxis    Adhesive [Medical Tape]     Amoxicillin     Ancef [Cefazolin]     Cephalosporins     Dofetilide      Cannot take generic    Epinephrine     Erythromycin     Iodine - Food Allergy     Levofloxacin     Losartan     Morphine     Other      seafood    Oxycodone     Penicillins     Percocet [Oxycodone-Acetaminophen]     Pineapple - Food Allergy     Thorazine [Chlorpromazine]      SUBJECTIVE:    Negro Melendez is seen today for a follow up for Major Depressive Disorder, anxiety, psychosis and dementia  She continues to experience on and off symptoms since the last visit  She continues to experience hallucinations/delusions since last visit  She does acknowledge some decrease in visual hallucinations, but staff report symptoms are ongoing and at times cause her distress  She is tolerating Seroquel and will increase dose to 50 mg HS  She denies any side effects from current psychiatric medications  PLAN:    All medications and routine orders were reviewed and updated as needed      Increase Seroquel    Medication management every 1 month  Plan discussed with: Patient    Diagnoses and all orders for this visit:    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 ([address] )    Major neurocognitive disorder, rule out dementia with Lewy bodies    Visual hallucinations        Current Outpatient Medications on File Prior to Visit   Medication Sig Dispense Refill    aspirin (ECOTRIN LOW STRENGTH) 81 mg EC tablet Take 1 tablet (81 mg total) by mouth daily 30 tablet 0    atorvastatin (LIPITOR) 10 mg tablet Take 10 mg by mouth daily      famotidine (PEPCID) 10 mg tablet Take 1 tablet (10 mg total) by mouth daily 30 tablet 0    folic acid (FOLVITE) 1 mg tablet Take 1 tablet (1 mg total) by mouth daily 30 tablet 0    furosemide (LASIX) 20 mg tablet Take 1 tablet (20 mg total) by mouth daily 30 tablet 0    levothyroxine 100 mcg tablet Take 1 tablet (100 mcg total) by mouth daily in the early morning 30 tablet 0    loratadine (CLARITIN) 10 mg tablet Take 10 mg by mouth daily      melatonin 3 mg Take 2 tablets (6 mg total) by mouth daily at bedtime 60 tablet 0    metoprolol tartrate (LOPRESSOR) 25 mg tablet Take 1 tablet (25 mg total) by mouth every 12 (twelve) hours 30 tablet 0    nitroglycerin (NITROSTAT) 0 4 mg SL tablet Place 1 tablet (0 4 mg total) under the tongue every 5 (five) minutes as needed for chest pain 30 tablet 0    potassium chloride (K-DUR,KLOR-CON) 20 mEq tablet Take 1 tablet (20 mEq total) by mouth daily 30 tablet 0    QUEtiapine (SEROquel) 25 mg tablet Take 37 5 mg by mouth daily at bedtime      rivastigmine (EXELON) 1 5 mg capsule Take 1 5 mg by mouth 2 (two) times a day      sertraline (ZOLOFT) 50 mg tablet Take 1 tablet (50 mg total) by mouth daily 30 tablet 0    vitamin B-12 (VITAMIN B-12) 1,000 mcg tablet Take 1 tablet (1,000 mcg total) by mouth daily 30 tablet 0    warfarin (COUMADIN) 5 mg tablet PT= 26 3 and INR 2 4 30 tablet 0     No current facility-administered medications on file prior to visit  Psychotherapy Provided:     Individual psychotherapy provided: Yes  Supportive counseling provided  Reassurance and supportive therapy provided  HPI ROS Appetite Changes and Sleep:     She reports fluctuating sleep pattern, fluctuating appetite, fluctuating energy levels   Patient denies suicidal or homicidal ideation    Review Of Systems:   all other systems are negative         Mental Status Evaluation:    Appearance:  age appropriate   Behavior:  cooperative   Speech:  normal rate, normal volume   Mood:  still at times depressed   Affect:  flat   Thought Process:  goal directed   Associations: circumstantial associations   Thought Content:  some paranoia   Perceptual Disturbances: visual hallucinations   Risk Potential: Suicidal ideation - None  Homicidal ideation - None  Potential for aggression - No   Sensorium:  oriented to person and place   Memory:  recent memory impaired   Consciousness:  alert and awake   Attention: attention span and concentration appear shorter than expected for age   Insight:  limited   Judgment: limited   Gait/Station: normal gait/station, normal balance   Motor Activity: no abnormal movements     Past Psychiatric History Update:     Inpatient Psychiatric Admission Since Last Encounter:   no  Suicide Attempt Or Self Mutilation Since Last Encounter:   no  Incidence of Violent Behavior Since Last Encounter:   no    Traumatic History Update:     New Onset of Abuse Since Last Encounter:   no  Traumatic Events Since Last Encounter:   no    Past Medical History:    Past Medical History:   Diagnosis Date    Acute cystitis 2/3/2021    Acute on chronic renal failure ([facility] [address] ) 7/27/2017    Arthritis     Heart attack ([facility] [address] )     High cholesterol     Hypertension     Irritation of left eye 2/8/2021     No past medical history pertinent negatives    Past Surgical History:   Procedure Laterality Date    ABDOMINAL SURGERY      cholecystectomy     CARDIAC SURGERY      bypass April 2000    CATARACT EXTRACTION Bilateral     HYSTERECTOMY       Allergies   Allergen Reactions    Shellfish-Derived Products - Food Allergy Anaphylaxis    Adhesive [Medical Tape]     Amoxicillin     Ancef [Cefazolin]     Cephalosporins     Dofetilide      Cannot take generic    Epinephrine     Erythromycin     Iodine - Food Allergy     Levofloxacin     Losartan     Morphine     Other      seafood    Oxycodone     Penicillins     Percocet [Oxycodone-Acetaminophen]     Pineapple - Food Allergy     Thorazine [Chlorpromazine]      Substance Abuse History:    Social History     Substance and Sexual Activity   Alcohol Use Never     Social History     Substance and Sexual Activity   Drug Use Never     Social History:    Changes since last encounter:  no  Family Psychiatric History:     Family History   Problem Relation Age of Onset    Stroke Mother     Diabetes Mother     Bone cancer Father      History Review: The following portions of the patient's history were reviewed and updated as appropriate: allergies, current medications, past family history, past medical history, past social history, past surgical history and problem list     OBJECTIVE:     Vital signs in last 24 hours: Vital signs and nursing notes reviewed in facility chart  Laboratory Results: Lab results reviewed in facility chart  Medications Risks/Benefits:      Risks, Benefits And Possible Side Effects Of Medications:    Discussed risks and benefits of treatment with patient including risk of parkinsonian symptoms, metabolic syndrome, tardive dyskinesia and neuroleptic malignant syndrome related to treatment with antipsychotic medications and risk of cardiovascular events in elderly related to treatment with antipsychotic medications     Controlled Medication Discussion:     Not applicable - controlled prescriptions are not prescribed by this practice    Evaluation of Psychotropic Drugs for possible gradual dose reductions    Psychotropic medications have been reviewed  Patient continues with symptoms of psychosis as noted above  Any/or further dose reductions at this time would be clinically contraindicated, as it would be likely to cause worsening of symptoms          KEIKO Velasco

## 2021-11-15 ENCOUNTER — NURSING HOME VISIT (OUTPATIENT)
Dept: GERIATRICS | Facility: OTHER | Age: 86
End: 2021-11-15
